# Patient Record
Sex: FEMALE | Race: WHITE | NOT HISPANIC OR LATINO | Employment: OTHER | URBAN - METROPOLITAN AREA
[De-identification: names, ages, dates, MRNs, and addresses within clinical notes are randomized per-mention and may not be internally consistent; named-entity substitution may affect disease eponyms.]

---

## 2017-10-12 ENCOUNTER — OFFICE VISIT (OUTPATIENT)
Dept: CARDIOLOGY | Facility: CLINIC | Age: 67
End: 2017-10-12

## 2017-10-12 VITALS
BODY MASS INDEX: 32.78 KG/M2 | SYSTOLIC BLOOD PRESSURE: 122 MMHG | HEIGHT: 64 IN | WEIGHT: 192 LBS | DIASTOLIC BLOOD PRESSURE: 77 MMHG | HEART RATE: 98 BPM

## 2017-10-12 DIAGNOSIS — I10 ESSENTIAL HYPERTENSION: ICD-10-CM

## 2017-10-12 DIAGNOSIS — M79.89 LEG SWELLING: ICD-10-CM

## 2017-10-12 DIAGNOSIS — Z79.01 ANTICOAGULATED: ICD-10-CM

## 2017-10-12 DIAGNOSIS — I48.19 PERSISTENT ATRIAL FIBRILLATION (HCC): Primary | ICD-10-CM

## 2017-10-12 DIAGNOSIS — I50.9 CONGESTIVE HEART FAILURE, UNSPECIFIED CONGESTIVE HEART FAILURE CHRONICITY, UNSPECIFIED CONGESTIVE HEART FAILURE TYPE: ICD-10-CM

## 2017-10-12 PROCEDURE — 93000 ELECTROCARDIOGRAM COMPLETE: CPT | Performed by: INTERNAL MEDICINE

## 2017-10-12 PROCEDURE — 99204 OFFICE O/P NEW MOD 45 MIN: CPT | Performed by: INTERNAL MEDICINE

## 2017-10-12 RX ORDER — LEVOTHYROXINE SODIUM 0.05 MG/1
50 TABLET ORAL DAILY
COMMUNITY
Start: 2017-07-26

## 2017-10-12 RX ORDER — ASPIRIN 81 MG/1
81 TABLET ORAL DAILY
COMMUNITY
End: 2017-11-28 | Stop reason: ALTCHOICE

## 2017-10-12 RX ORDER — ASCORBIC ACID 500 MG
1000 TABLET ORAL DAILY
Status: ON HOLD | COMMUNITY
End: 2018-01-05

## 2017-10-12 RX ORDER — FUROSEMIDE 20 MG/1
20 TABLET ORAL 2 TIMES DAILY
Qty: 30 TABLET | Refills: 3 | Status: SHIPPED | OUTPATIENT
Start: 2017-10-12 | End: 2017-12-07 | Stop reason: SDUPTHER

## 2017-10-12 RX ORDER — AMLODIPINE BESYLATE 10 MG/1
5 TABLET ORAL DAILY
COMMUNITY
Start: 2017-07-26

## 2017-10-12 RX ORDER — OMEPRAZOLE 40 MG/1
40 CAPSULE, DELAYED RELEASE ORAL DAILY
COMMUNITY
End: 2018-02-01 | Stop reason: ALTCHOICE

## 2017-10-12 RX ORDER — ERGOCALCIFEROL 1.25 MG/1
50000 CAPSULE ORAL WEEKLY
COMMUNITY
End: 2018-09-25 | Stop reason: ALTCHOICE

## 2017-10-12 RX ORDER — VALSARTAN 160 MG/1
320 TABLET ORAL DAILY
COMMUNITY
Start: 2017-10-10

## 2017-10-12 RX ORDER — LANOLIN ALCOHOL/MO/W.PET/CERES
1000 CREAM (GRAM) TOPICAL DAILY
Status: ON HOLD | COMMUNITY
End: 2018-01-05

## 2017-10-12 RX ORDER — RIVAROXABAN 20 MG/1
20 TABLET, FILM COATED ORAL
Status: ON HOLD | COMMUNITY
Start: 2017-10-10 | End: 2018-01-05

## 2017-10-12 RX ORDER — TRAMADOL HYDROCHLORIDE 50 MG/1
TABLET ORAL
Status: ON HOLD | COMMUNITY
Start: 2017-09-26 | End: 2018-01-05

## 2017-10-12 NOTE — PROGRESS NOTES
" Subjective:        CC  Chest pain     Tatyana Reyes is a 67 y.o. female who  is here for the cardiac evaluation as the patient recently had a left shoulder surgery or by deep venous thrombosis, has been complaining of the chest pains are retrosternal intermittent was seen in emergency room for the workup was negative was found to have the atrial flutter yesterday    Patient also complains of occasional palpitations intermittent mild in intensity associated with shortness of breath    Past Medical History:   Diagnosis Date   • Hypertension     reports that she has never smoked. She has never used smokeless tobacco. She reports that she does not drink alcohol or use illicit drugs.  Family History   Problem Relation Age of Onset   • Heart disease Mother    • Stroke Mother         Review of Systems  Constitutional: No wt loss, fever   Gastrointestinal: No nausea , abdominal pain  Behavioral/Psych: No insomnia or anxiety  Cardiovascular ----positive for Palpitation. All other systems reviewed and are negative    Objective:       Physical Exam             Physical Exam  /77  Pulse 98  Ht 64\" (162.6 cm)  Wt 192 lb (87.1 kg)  BMI 32.96 kg/m2    General appearance: NAD, conversant   Eyes: anicteric sclerae, moist conjunctivae; no lid-lag; PERRLA   HENT: Atraumatic; oropharynx clear with moist mucous membranes and no mucosal ulcerations;  normal hard and soft palate   Neck: Trachea midline; FROM, supple, no thyromegaly or lymphadenopathy   Lungs: CTA, with normal respiratory effort and no intercostal retractions   CV: S1-S2 regular, sys murmurs, no rub, no gallop   Abdomen: Soft, non-tender; no masses or HSM   Extremities: No peripheral edema or extremity lymphadenopathy  Skin: Normal temperature, turgor and texture; no rash, ulcers or subcutaneous nodules   Psych: Appropriate affect, alert and oriented to person, place and time           Cardiographics  @  ECG 12 Lead  Date/Time: 10/12/2017 12:46 PM  Performed by: " ROXANNA ARCOS  Authorized by: ROXANNA ARCOS   Comparison: not compared with previous ECG   Previous ECG: no previous ECG available  Rhythm: atrial fibrillation  ST Flattening: all  Clinical impression: abnormal ECG            Echocardiogram:     Imaging  Chest x-ray: not done     Lab Review   not applicable       Current Outpatient Prescriptions:   •  amLODIPine (NORVASC) 10 MG tablet, , Disp: , Rfl:   •  aspirin 81 MG EC tablet, Take 81 mg by mouth Daily., Disp: , Rfl:   •  levothyroxine (SYNTHROID, LEVOTHROID) 50 MCG tablet, , Disp: , Rfl:   •  Multiple Vitamins-Minerals (CENTRUM SILVER 50+WOMEN) tablet, Take  by mouth., Disp: , Rfl:   •  omeprazole (priLOSEC) 40 MG capsule, Take 40 mg by mouth Daily., Disp: , Rfl:   •  traMADol (ULTRAM) 50 MG tablet, , Disp: , Rfl:   •  valsartan (DIOVAN) 160 MG tablet, Daily. 1.5 tablets daily, Disp: , Rfl:   •  vitamin B-12 (CYANOCOBALAMIN) 1000 MCG tablet, Take 1,000 mcg by mouth Daily., Disp: , Rfl:   •  vitamin C (ASCORBIC ACID) 500 MG tablet, Take 1,000 mg by mouth Daily., Disp: , Rfl:   •  vitamin D (ERGOCALCIFEROL) 31251 units capsule capsule, Take 50,000 Units by mouth 1 (One) Time Per Week., Disp: , Rfl:   •  XARELTO 20 MG tablet, , Disp: , Rfl:         Assessment:      1. Persistent atrial fibrillation    2. Leg swelling    3. Congestive heart failure, unspecified congestive heart failure chronicity, unspecified congestive heart failure type    4. Essential hypertension    5. Anticoagulated        Patient Active Problem List   Diagnosis   • Persistent atrial fibrillation   • Leg swelling   • Congestive heart failure   • Essential hypertension   • Anticoagulated         Plan:          1. Persistent atrial fibrillation  Controlled rate  - ECG 12 Lead  - Adult Transthoracic Echo Complete W/ Cont if Necessary Per Protocol  - Adult Transthoracic Echo Complete W/ Cont if Necessary Per Protocol    2. Leg swelling  Tatyana Amy has been complaining of the leg  swelling, appears dependent pedal edema, significantly contributed with a venous insufficiency.    Strong recommendations of elevating the legs as well as using support hoses, along with the control of fluids and salt restriction has been explained in details    - Adult Transthoracic Echo Complete W/ Cont if Necessary Per Protocol  - Adult Transthoracic Echo Complete W/ Cont if Necessary Per Protocol    3. Congestive heart failure, unspecified congestive heart failure chronicity, unspecified congestive heart failure type  Bilateral leg swelling highly suggestive of cardiomyopathy will need the further workup  - Adult Transthoracic Echo Complete W/ Cont if Necessary Per Protocol  - Adult Transthoracic Echo Complete W/ Cont if Necessary Per Protocol    4. Essential hypertension  Blood pressures are well under control  - Adult Transthoracic Echo Complete W/ Cont if Necessary Per Protocol  - Adult Transthoracic Echo Complete W/ Cont if Necessary Per Protocol    5. Anticoagulated  Pros and cons as well as indication of the anticoagulation has been explained to the patient in detail    There are no obvious complications at this stage    Risk of  the bleedings has been explained    Need for the regular blood workup and adjust the dose has been explained    Need for proper follow-up on anticoagulation also has been explained    - Adult Transthoracic Echo Complete W/ Cont if Necessary Per Protocol  - Adult Transthoracic Echo Complete W/ Cont if Necessary Per Protocol      Add lasix 20 mg    Reduce amilodopine 5 mg    Echo    See us 2 wks      Counseling was given to Tatyana Reyes for the following topics:  risk factor reductions, prognosis and risks and benefits of treatment options .       SMOKING COUNSELING:    Counseling given: Not Answered      .  EMR Dragon/Transcription disclaimer:   Much of this encounter note is an electronic transcription/translation of spoken language to printed text. The electronic translation of  spoken language may permit erroneous, or at times, nonsensical words or phrases to be inadvertently transcribed; Although I have reviewed the note for such errors, some may still exist.

## 2017-10-31 ENCOUNTER — HOSPITAL ENCOUNTER (OUTPATIENT)
Dept: CARDIOLOGY | Facility: HOSPITAL | Age: 67
Discharge: HOME | End: 2017-10-31
Attending: INTERNAL MEDICINE | Admitting: INTERNAL MEDICINE

## 2017-10-31 LAB
BH CV ECHO MEAS - ACS: 1.5 CM
BH CV ECHO MEAS - AO MAX PG (FULL): 7.6 MMHG
BH CV ECHO MEAS - AO MAX PG: 14.7 MMHG
BH CV ECHO MEAS - AO MEAN PG (FULL): 4 MMHG
BH CV ECHO MEAS - AO MEAN PG: 7 MMHG
BH CV ECHO MEAS - AO ROOT AREA (BSA CORRECTED): 1.2
BH CV ECHO MEAS - AO ROOT AREA: 4.2 CM^2
BH CV ECHO MEAS - AO ROOT DIAM: 2.3 CM
BH CV ECHO MEAS - AO V2 MAX: 192 CM/SEC
BH CV ECHO MEAS - AO V2 MEAN: 120 CM/SEC
BH CV ECHO MEAS - AO V2 VTI: 37.4 CM
BH CV ECHO MEAS - ASC AORTA: 2.6 CM
BH CV ECHO MEAS - AVA(I,A): 2.1 CM^2
BH CV ECHO MEAS - AVA(I,D): 2.1 CM^2
BH CV ECHO MEAS - AVA(V,A): 2.2 CM^2
BH CV ECHO MEAS - AVA(V,D): 2.2 CM^2
BH CV ECHO MEAS - BSA(HAYCOCK): 2 M^2
BH CV ECHO MEAS - BSA: 1.9 M^2
BH CV ECHO MEAS - BZI_BMI: 33 KILOGRAMS/M^2
BH CV ECHO MEAS - BZI_METRIC_HEIGHT: 162.6 CM
BH CV ECHO MEAS - BZI_METRIC_WEIGHT: 87.1 KG
BH CV ECHO MEAS - CONTRAST EF (2CH): 62.8 ML/M^2
BH CV ECHO MEAS - CONTRAST EF 4CH: 57.9 ML/M^2
BH CV ECHO MEAS - EDV(CUBED): 103.8 ML
BH CV ECHO MEAS - EDV(MOD-SP2): 43 ML
BH CV ECHO MEAS - EDV(MOD-SP4): 57 ML
BH CV ECHO MEAS - EDV(TEICH): 102.4 ML
BH CV ECHO MEAS - EF(CUBED): 76.5 %
BH CV ECHO MEAS - EF(MOD-SP2): 62.8 %
BH CV ECHO MEAS - EF(MOD-SP4): 57.9 %
BH CV ECHO MEAS - EF(TEICH): 68.5 %
BH CV ECHO MEAS - ESV(CUBED): 24.4 ML
BH CV ECHO MEAS - ESV(MOD-SP2): 16 ML
BH CV ECHO MEAS - ESV(MOD-SP4): 24 ML
BH CV ECHO MEAS - ESV(TEICH): 32.2 ML
BH CV ECHO MEAS - FS: 38.3 %
BH CV ECHO MEAS - IVS/LVPW: 1
BH CV ECHO MEAS - IVSD: 1.2 CM
BH CV ECHO MEAS - LAT PEAK E' VEL: 12 CM/SEC
BH CV ECHO MEAS - LV DIASTOLIC VOL/BSA (35-75): 29.7 ML/M^2
BH CV ECHO MEAS - LV MASS(C)D: 212 GRAMS
BH CV ECHO MEAS - LV MASS(C)DI: 110.3 GRAMS/M^2
BH CV ECHO MEAS - LV MAX PG: 7.2 MMHG
BH CV ECHO MEAS - LV MEAN PG: 3 MMHG
BH CV ECHO MEAS - LV SYSTOLIC VOL/BSA (12-30): 12.5 ML/M^2
BH CV ECHO MEAS - LV V1 MAX: 134 CM/SEC
BH CV ECHO MEAS - LV V1 MEAN: 83.9 CM/SEC
BH CV ECHO MEAS - LV V1 VTI: 24.6 CM
BH CV ECHO MEAS - LVIDD: 4.7 CM
BH CV ECHO MEAS - LVIDS: 2.9 CM
BH CV ECHO MEAS - LVLD AP2: 6.7 CM
BH CV ECHO MEAS - LVLD AP4: 7.3 CM
BH CV ECHO MEAS - LVLS AP2: 5.9 CM
BH CV ECHO MEAS - LVLS AP4: 5.7 CM
BH CV ECHO MEAS - LVOT AREA (M): 3.1 CM^2
BH CV ECHO MEAS - LVOT AREA: 3.1 CM^2
BH CV ECHO MEAS - LVOT DIAM: 2 CM
BH CV ECHO MEAS - LVPWD: 1.2 CM
BH CV ECHO MEAS - MED PEAK E' VEL: 10 CM/SEC
BH CV ECHO MEAS - MR MAX PG: 52.4 MMHG
BH CV ECHO MEAS - MR MAX VEL: 362 CM/SEC
BH CV ECHO MEAS - MV DEC SLOPE: 462 CM/SEC^2
BH CV ECHO MEAS - MV DEC TIME: 127 SEC
BH CV ECHO MEAS - MV E MAX VEL: 112 CM/SEC
BH CV ECHO MEAS - MV MAX PG: 6.4 MMHG
BH CV ECHO MEAS - MV MEAN PG: 2 MMHG
BH CV ECHO MEAS - MV P1/2T MAX VEL: 134 CM/SEC
BH CV ECHO MEAS - MV P1/2T: 85 MSEC
BH CV ECHO MEAS - MV V2 MAX: 126 CM/SEC
BH CV ECHO MEAS - MV V2 MEAN: 55.7 CM/SEC
BH CV ECHO MEAS - MV V2 VTI: 31.2 CM
BH CV ECHO MEAS - MVA P1/2T LCG: 1.6 CM^2
BH CV ECHO MEAS - MVA(P1/2T): 2.6 CM^2
BH CV ECHO MEAS - MVA(VTI): 2.5 CM^2
BH CV ECHO MEAS - PA ACC TIME: 0.06 SEC
BH CV ECHO MEAS - PA MAX PG (FULL): 4.5 MMHG
BH CV ECHO MEAS - PA MAX PG: 6.4 MMHG
BH CV ECHO MEAS - PA PR(ACCEL): 52.9 MMHG
BH CV ECHO MEAS - PA V2 MAX: 126 CM/SEC
BH CV ECHO MEAS - PULM DIAS VEL: 66.2 CM/SEC
BH CV ECHO MEAS - PULM S/D: 0.83
BH CV ECHO MEAS - PULM SYS VEL: 54.9 CM/SEC
BH CV ECHO MEAS - PVA(V,A): 1.5 CM^2
BH CV ECHO MEAS - PVA(V,D): 1.5 CM^2
BH CV ECHO MEAS - QP/QS: 0.53
BH CV ECHO MEAS - RAP SYSTOLE: 3 MMHG
BH CV ECHO MEAS - RV MAX PG: 1.8 MMHG
BH CV ECHO MEAS - RV MEAN PG: 1 MMHG
BH CV ECHO MEAS - RV V1 MAX: 67.2 CM/SEC
BH CV ECHO MEAS - RV V1 MEAN: 45.1 CM/SEC
BH CV ECHO MEAS - RV V1 VTI: 14.4 CM
BH CV ECHO MEAS - RVOT AREA: 2.8 CM^2
BH CV ECHO MEAS - RVOT DIAM: 1.9 CM
BH CV ECHO MEAS - RVSP: 35 MMHG
BH CV ECHO MEAS - SI(AO): 80.8 ML/M^2
BH CV ECHO MEAS - SI(CUBED): 41.3 ML/M^2
BH CV ECHO MEAS - SI(LVOT): 40.2 ML/M^2
BH CV ECHO MEAS - SI(MOD-SP2): 14 ML/M^2
BH CV ECHO MEAS - SI(MOD-SP4): 17.2 ML/M^2
BH CV ECHO MEAS - SI(TEICH): 36.5 ML/M^2
BH CV ECHO MEAS - SV(AO): 155.4 ML
BH CV ECHO MEAS - SV(CUBED): 79.4 ML
BH CV ECHO MEAS - SV(LVOT): 77.3 ML
BH CV ECHO MEAS - SV(MOD-SP2): 27 ML
BH CV ECHO MEAS - SV(MOD-SP4): 33 ML
BH CV ECHO MEAS - SV(RVOT): 40.8 ML
BH CV ECHO MEAS - SV(TEICH): 70.1 ML
BH CV ECHO MEAS - TAPSE (>1.6): 1.7 CM2
BH CV ECHO MEAS - TR MAX VEL: 284 CM/SEC
BH CV VAS BP LEFT ARM: NORMAL MMHG
BH CV XLRA - RV BASE: 3.5 CM
BH CV XLRA - TDI S': 11 CM/SEC
E/E' RATIO: 10
LEFT ATRIUM VOLUME INDEX: 29.3 ML/M2
MAXIMAL PREDICTED HEART RATE: 151 BPM
STRESS TARGET HR: 128 BPM

## 2017-10-31 PROCEDURE — 93306 TTE W/DOPPLER COMPLETE: CPT | Performed by: INTERNAL MEDICINE

## 2017-10-31 PROCEDURE — 0399T HC MYOCARDL STRAIN IMAG QUAN ASSMT PER SESS: CPT

## 2017-10-31 PROCEDURE — 0399T ADULT TRANSTHORACIC ECHO COMPLETE W/ CONT IF NECESSARY PER PROTOCOL: CPT | Performed by: INTERNAL MEDICINE

## 2017-10-31 PROCEDURE — 93306 TTE W/DOPPLER COMPLETE: CPT

## 2017-11-02 ENCOUNTER — OFFICE VISIT (OUTPATIENT)
Dept: CARDIOLOGY | Facility: CLINIC | Age: 67
End: 2017-11-02

## 2017-11-02 VITALS
WEIGHT: 182 LBS | HEART RATE: 80 BPM | HEIGHT: 65 IN | BODY MASS INDEX: 30.32 KG/M2 | SYSTOLIC BLOOD PRESSURE: 144 MMHG | DIASTOLIC BLOOD PRESSURE: 79 MMHG

## 2017-11-02 DIAGNOSIS — I50.9 CONGESTIVE HEART FAILURE, UNSPECIFIED CONGESTIVE HEART FAILURE CHRONICITY, UNSPECIFIED CONGESTIVE HEART FAILURE TYPE: ICD-10-CM

## 2017-11-02 DIAGNOSIS — I48.91 ATRIAL FIBRILLATION, UNSPECIFIED TYPE (HCC): ICD-10-CM

## 2017-11-02 DIAGNOSIS — R07.89 CHEST PRESSURE: ICD-10-CM

## 2017-11-02 DIAGNOSIS — I48.19 PERSISTENT ATRIAL FIBRILLATION (HCC): Primary | ICD-10-CM

## 2017-11-02 PROCEDURE — 99214 OFFICE O/P EST MOD 30 MIN: CPT | Performed by: INTERNAL MEDICINE

## 2017-11-02 NOTE — PROGRESS NOTES
" Subjective:       Tatyana Reyes is a 67 y.o. female who here for follow up    CC  NOSE BLEED   HPI  On the follow-up for the atrial fibrillation congestive heart failure chest pressure, patient has been complaining of off-and-on episodes of the chest pains and tightness in chest mild to moderate in intensity     Problem List Items Addressed This Visit        Cardiovascular and Mediastinum    Persistent atrial fibrillation - Primary    Relevant Orders    Stress Test With Myocardial Perfusion One Day    Congestive heart failure    Relevant Orders    Stress Test With Myocardial Perfusion One Day      Other Visit Diagnoses     Atrial fibrillation, unspecified type        Relevant Orders    Stress Test With Myocardial Perfusion One Day    Chest pressure        Relevant Orders    Stress Test With Myocardial Perfusion One Day        .    The following portions of the patient's history were reviewed and updated as appropriate: allergies, current medications, past family history, past medical history, past social history, past surgical history and problem list.    Past Medical History:   Diagnosis Date   • Hypertension     reports that she has never smoked. She has never used smokeless tobacco. She reports that she does not drink alcohol or use illicit drugs.  Family History   Problem Relation Age of Onset   • Heart disease Mother    • Stroke Mother        Review of Systems  Constitutional: No wt loss, fever, fatigue  Gastrointestinal: No nausea, abdominal pain  Behavioral/Psych: No insomnia or anxiety   CardiovascularChest pains and tightness in chest  Objective:       Physical Exam             Physical Exam  /79  Pulse 80  Ht 65\" (165.1 cm)  Wt 182 lb (82.6 kg)  BMI 30.29 kg/m2    General appearance: NAD, conversant   Eyes: anicteric sclerae, moist conjunctivae; no lid-lag; PERRLA   HENT: Atraumatic; oropharynx clear with moist mucous membranes and no mucosal ulcerations;  normal hard and soft palate   Neck: " Trachea midline; FROM, supple, no thyromegaly or lymphadenopathy   Lungs: CTA, with normal respiratory effort and no intercostal retractions   CV: S1-S2 regular, no murmurs, no rub, no gallop   Abdomen: Soft, non-tender; no masses or HSM   Extremities: No peripheral edema or extremity lymphadenopathy  Skin: Normal temperature, turgor and texture; no rash, ulcers or subcutaneous nodules   Psych: Appropriate affect, alert and oriented to person, place and time           Cardiographics  @Procedures    Echocardiogram:        Current Outpatient Prescriptions:   •  amLODIPine (NORVASC) 10 MG tablet, TAKE 1/2 TABLET DAILY, Disp: , Rfl:   •  aspirin 81 MG EC tablet, Take 81 mg by mouth Daily., Disp: , Rfl:   •  furosemide (LASIX) 20 MG tablet, Take 1 tablet by mouth 2 (Two) Times a Day., Disp: 30 tablet, Rfl: 3  •  levothyroxine (SYNTHROID, LEVOTHROID) 50 MCG tablet, , Disp: , Rfl:   •  Multiple Vitamins-Minerals (CENTRUM SILVER 50+WOMEN) tablet, Take  by mouth., Disp: , Rfl:   •  omeprazole (priLOSEC) 40 MG capsule, Take 40 mg by mouth Daily., Disp: , Rfl:   •  traMADol (ULTRAM) 50 MG tablet, , Disp: , Rfl:   •  valsartan (DIOVAN) 160 MG tablet, Daily. 1.5 tablets daily, Disp: , Rfl:   •  vitamin B-12 (CYANOCOBALAMIN) 1000 MCG tablet, Take 1,000 mcg by mouth Daily., Disp: , Rfl:   •  vitamin C (ASCORBIC ACID) 500 MG tablet, Take 1,000 mg by mouth Daily., Disp: , Rfl:   •  vitamin D (ERGOCALCIFEROL) 30654 units capsule capsule, Take 50,000 Units by mouth 1 (One) Time Per Week., Disp: , Rfl:   •  XARELTO 20 MG tablet, , Disp: , Rfl:    Assessment:        Patient Active Problem List   Diagnosis   • Persistent atrial fibrillation   • Leg swelling   • Congestive heart failure   • Essential hypertension   • Anticoagulated     Interpretation Summary   · Left atrial cavity size is mildly dilated.  · Mild mitral valve regurgitation is present  · Mild tricuspid valve regurgitation is present.  · Left Ventricle: Calculated EF =  57.9%.  · There is no evidence of pericardial effusion                   Plan:            ICD-10-CM ICD-9-CM   1. Persistent atrial fibrillation I48.1 427.31   2. Congestive heart failure, unspecified congestive heart failure chronicity, unspecified congestive heart failure type I50.9 428.0   3. Atrial fibrillation, unspecified type I48.91 427.31   4. Chest pressure R07.89 786.59     1. Persistent atrial fibrillation  Under control  - Stress Test With Myocardial Perfusion One Day    2. Congestive heart failure, unspecified congestive heart failure chronicity, unspecified congestive heart failure type  Compensated  - Stress Test With Myocardial Perfusion One Day    3. Atrial fibrillation, unspecified type    - Stress Test With Myocardial Perfusion One Day    4. Chest pressure  Considering the patient's symptoms as well as clinical situation and  EKG findings, along with cardiac risk factors, ischemic workup is necessary to rule out ischemic cardiomyopathy, stress induced arrhythmias, and functional capacity for diagnosis as well as prognostic consideration    - Stress Test With Myocardial Perfusion One Day     LEXISCAN CARDIOLYTE    ENT REFFERAL    NOSE BLEED     SEE US 1 MONTH    STOP ASA FOR NOW    USE FUROSEMODE TO ONCE A DAY AND PRN    WILL CONSIDER CV  COUNSELING:    Tatyana GoldtCounseling was given to patient for the following topics: diagnostic results, risk factor reductions, impressions, risks and benefits of treatment options and importance of treatment compliance .       SMOKING COUNSELING:    Counseling given: Not Answered      EMR Dragon/Transcription disclaimer:   Much of this encounter note is an electronic transcription/translation of spoken language to printed text. The electronic translation of spoken language may permit erroneous, or at times, nonsensical words or phrases to be inadvertently transcribed; Although I have reviewed the note for such errors, some may still exist.

## 2017-11-28 ENCOUNTER — HOSPITAL ENCOUNTER (OUTPATIENT)
Dept: CARDIOLOGY | Facility: HOSPITAL | Age: 67
Discharge: HOME | End: 2017-11-28
Attending: INTERNAL MEDICINE

## 2017-11-28 VITALS
HEART RATE: 60 BPM | SYSTOLIC BLOOD PRESSURE: 128 MMHG | WEIGHT: 182 LBS | RESPIRATION RATE: 18 BRPM | BODY MASS INDEX: 30.32 KG/M2 | OXYGEN SATURATION: 99 % | DIASTOLIC BLOOD PRESSURE: 84 MMHG | HEIGHT: 65 IN

## 2017-11-28 PROCEDURE — A9500 TC99M SESTAMIBI: HCPCS | Performed by: INTERNAL MEDICINE

## 2017-11-28 PROCEDURE — 25010000002 REGADENOSON 0.4 MG/5ML SOLUTION: Performed by: INTERNAL MEDICINE

## 2017-11-28 PROCEDURE — 93018 CV STRESS TEST I&R ONLY: CPT | Performed by: INTERNAL MEDICINE

## 2017-11-28 PROCEDURE — 0 TECHNETIUM SESTAMIBI: Performed by: INTERNAL MEDICINE

## 2017-11-28 PROCEDURE — 78452 HT MUSCLE IMAGE SPECT MULT: CPT

## 2017-11-28 PROCEDURE — 93016 CV STRESS TEST SUPVJ ONLY: CPT | Performed by: INTERNAL MEDICINE

## 2017-11-28 PROCEDURE — 93017 CV STRESS TEST TRACING ONLY: CPT

## 2017-11-28 PROCEDURE — 78452 HT MUSCLE IMAGE SPECT MULT: CPT | Performed by: INTERNAL MEDICINE

## 2017-11-28 RX ADMIN — REGADENOSON 0.4 MG: 0.08 INJECTION, SOLUTION INTRAVENOUS at 09:35

## 2017-11-28 RX ADMIN — TECHNETIUM TC-99M SESTAMIBI 1 DOSE: 1 INJECTION INTRAVENOUS at 08:10

## 2017-11-28 RX ADMIN — TECHNETIUM TC-99M SESTAMIBI 1 DOSE: 1 INJECTION INTRAVENOUS at 09:35

## 2017-11-29 LAB
BH CV STRESS BP STAGE 1: NORMAL
BH CV STRESS COMMENTS STAGE 1: NORMAL
BH CV STRESS DOSE REGADENOSON STAGE 1: 0.4
BH CV STRESS DURATION MIN STAGE 1: 0
BH CV STRESS DURATION SEC STAGE 1: 15
BH CV STRESS HR STAGE 1: 109
BH CV STRESS O2 STAGE 1: 99
BH CV STRESS PROTOCOL 1: NORMAL
BH CV STRESS RECOVERY BP: NORMAL MMHG
BH CV STRESS RECOVERY HR: 90 BPM
BH CV STRESS RECOVERY O2: 99 %
BH CV STRESS STAGE 1: 1
LV EF NUC BP: 60 %
MAXIMAL PREDICTED HEART RATE: 151 BPM
PERCENT MAX PREDICTED HR: 72.19 %
STRESS BASELINE BP: NORMAL MMHG
STRESS BASELINE HR: 95 BPM
STRESS O2 SAT REST: 99 %
STRESS PERCENT HR: 85 %
STRESS POST ESTIMATED WORKLOAD: 1 METS
STRESS POST EXERCISE DUR MIN: 0 MIN
STRESS POST EXERCISE DUR SEC: 0 SEC
STRESS POST O2 SAT PEAK: 99 %
STRESS POST PEAK BP: NORMAL MMHG
STRESS POST PEAK HR: 109 BPM
STRESS TARGET HR: 128 BPM

## 2017-12-07 ENCOUNTER — OFFICE VISIT (OUTPATIENT)
Dept: CARDIOLOGY | Facility: CLINIC | Age: 67
End: 2017-12-07

## 2017-12-07 VITALS
SYSTOLIC BLOOD PRESSURE: 147 MMHG | WEIGHT: 174 LBS | BODY MASS INDEX: 28.96 KG/M2 | DIASTOLIC BLOOD PRESSURE: 77 MMHG | HEART RATE: 83 BPM

## 2017-12-07 DIAGNOSIS — I48.19 PERSISTENT ATRIAL FIBRILLATION (HCC): ICD-10-CM

## 2017-12-07 DIAGNOSIS — R94.30 ABNORMAL CARDIAC FUNCTION TEST: Primary | ICD-10-CM

## 2017-12-07 DIAGNOSIS — I10 ESSENTIAL HYPERTENSION: ICD-10-CM

## 2017-12-07 DIAGNOSIS — R07.89 OTHER CHEST PAIN: ICD-10-CM

## 2017-12-07 DIAGNOSIS — R07.2 PRECORDIAL PAIN: ICD-10-CM

## 2017-12-07 PROCEDURE — 99213 OFFICE O/P EST LOW 20 MIN: CPT | Performed by: INTERNAL MEDICINE

## 2017-12-07 RX ORDER — FUROSEMIDE 20 MG/1
20 TABLET ORAL DAILY
Qty: 90 TABLET | Refills: 3 | Status: SHIPPED | OUTPATIENT
Start: 2017-12-07

## 2017-12-07 NOTE — PROGRESS NOTES
Subjective:       Tatyana Reyes is a 67 y.o. female who here for follow up    CC  Follow-up after the stress test as well as echocardiogram performed last week  HPI  67-year-old female with known history of atrial fibrillation underwent a stress test as well as echocardiogram last week which is moderately abnormal continues to complains of shortness of breath    Tatyana Reyes has been complaining of the shortness of breath mild-to-moderate in intensity with mild-to-moderate usually relieved with rest associated with anxiety and fatigue       Problem List Items Addressed This Visit        Cardiovascular and Mediastinum    Abnormal cardiac function test - Primary    Relevant Orders    Case Request Cath Lab: Left Heart Cath (Completed)    CBC & Differential    Basic Metabolic Panel    aPTT    Protime-INR    Persistent atrial fibrillation    Essential hypertension    Relevant Medications    furosemide (LASIX) 20 MG tablet      Other Visit Diagnoses     Other chest pain         Relevant Orders    aPTT    Precordial pain         Relevant Orders    Protime-INR        .    The following portions of the patient's history were reviewed and updated as appropriate: allergies, current medications, past family history, past medical history, past social history, past surgical history and problem list.    Past Medical History:   Diagnosis Date   • Atrial fibrillation    • Disease of thyroid gland    • Hypertension     reports that she has never smoked. She has never used smokeless tobacco. She reports that she does not drink alcohol or use illicit drugs.  Family History   Problem Relation Age of Onset   • Heart disease Mother    • Stroke Mother    • Diabetes Brother        Review of Systems  Constitutional: No wt loss, fever, fatigue  Gastrointestinal: No nausea, abdominal pain  Behavioral/Psych: No insomnia or anxiety   Cardiovascular Shortness of breath  Objective:       Physical Exam             Physical Exam  /77  Pulse  83  Wt 78.9 kg (174 lb)  BMI 28.96 kg/m2    General appearance: NAD, conversant   Eyes: anicteric sclerae, moist conjunctivae; no lid-lag; PERRLA   HENT: Atraumatic; oropharynx clear with moist mucous membranes and no mucosal ulcerations;  normal hard and soft palate   Neck: Trachea midline; FROM, supple, no thyromegaly or lymphadenopathy   Lungs: CTA, with normal respiratory effort and no intercostal retractions   CV: S1-S2 irregular, no murmurs, no rub, no gallop   Abdomen: Soft, non-tender; no masses or HSM   Extremities: No peripheral edema or extremity lymphadenopathy  Skin: Normal temperature, turgor and texture; no rash, ulcers or subcutaneous nodules   Psych: Appropriate affect, alert and oriented to person, place and time           Cardiographics  @Procedures    Echocardiogram:        Current Outpatient Prescriptions:   •  amLODIPine (NORVASC) 10 MG tablet, TAKE 1/2 TABLET DAILY, Disp: , Rfl:   •  furosemide (LASIX) 20 MG tablet, Take 1 tablet by mouth 2 (Two) Times a Day., Disp: 30 tablet, Rfl: 3  •  levothyroxine (SYNTHROID, LEVOTHROID) 50 MCG tablet, , Disp: , Rfl:   •  Multiple Vitamins-Minerals (CENTRUM SILVER 50+WOMEN) tablet, Take  by mouth., Disp: , Rfl:   •  omeprazole (priLOSEC) 40 MG capsule, Take 40 mg by mouth Daily., Disp: , Rfl:   •  traMADol (ULTRAM) 50 MG tablet, , Disp: , Rfl:   •  valsartan (DIOVAN) 160 MG tablet, Daily. 1.5 tablets daily, Disp: , Rfl:   •  vitamin B-12 (CYANOCOBALAMIN) 1000 MCG tablet, Take 1,000 mcg by mouth Daily., Disp: , Rfl:   •  vitamin C (ASCORBIC ACID) 500 MG tablet, Take 1,000 mg by mouth Daily., Disp: , Rfl:   •  vitamin D (ERGOCALCIFEROL) 22564 units capsule capsule, Take 50,000 Units by mouth 1 (One) Time Per Week., Disp: , Rfl:   •  XARELTO 20 MG tablet, , Disp: , Rfl:    Assessment:        Patient Active Problem List   Diagnosis   • Persistent atrial fibrillation   • Leg swelling   • Congestive heart failure   • Essential hypertension   • Anticoagulated      Interpretation Summary      · Findings consistent with a normal ECG stress test.  · Left ventricular ejection fraction is normal (Calculated EF = 60%).  · Myocardial perfusion imaging indicates a small-sized, mildly severe area of ischemia located in the apex and inferior wall.  · Impressions are consistent with an intermediate risk study.         Interpretation Summary   · Left atrial cavity size is mildly dilated.  · Mild mitral valve regurgitation is present  · Mild tricuspid valve regurgitation is present.  · Left Ventricle: Calculated EF = 57.9%.  · There is no evidence of pericardial effusion               Plan:            ICD-10-CM ICD-9-CM   1. Abnormal cardiac function test R94.30 794.30   2. Other chest pain  R07.89 786.59   3. Precordial pain  R07.2 786.51   4. Persistent atrial fibrillation I48.1 427.31   5. Essential hypertension I10 401.9     1. Abnormal cardiac function test  Procedure, risks and options of cardiac cath explained to pt INCLUDING BUT NOT LIMITED TO MI, STROKE, DEATH, INFECTION HAEMORRHAGE, . Pt understands well and agrees with no further questions.  - Case Request Cath Lab: Left Heart Cath  - CBC & Differential  - Basic Metabolic Panel  - aPTT  - Protime-INR    2. Other chest pain   Will need further ischemic workup  - aPTT    3. Precordial pain   Will proceed with ischemic workup  - Protime-INR    4. Persistent atrial fibrillation  Now in normal sinus rhythm    5. Essential hypertension  Well-controlled with current medications     Procedure, risks and options of cardiac cath explained to pt INCLUDING BUT NOT LIMITED TO MI, STROKE, DEATH, INFECTION HAEMORRHAGE, . Pt understands well and agrees with no further questions.  COUNSELING:    Tatyana Ayala was given to patient for the following topics: diagnostic results, risk factor reductions, impressions, risks and benefits of treatment options and importance of treatment compliance .       SMOKING COUNSELING:    Counseling  given: Not Answered      EMR Dragon/Transcription disclaimer:   Much of this encounter note is an electronic transcription/translation of spoken language to printed text. The electronic translation of spoken language may permit erroneous, or at times, nonsensical words or phrases to be inadvertently transcribed; Although I have reviewed the note for such errors, some may still exist.

## 2017-12-12 ENCOUNTER — TELEPHONE (OUTPATIENT)
Dept: CARDIOLOGY | Facility: CLINIC | Age: 67
End: 2017-12-12

## 2017-12-12 NOTE — TELEPHONE ENCOUNTER
----- Message from Augustina Avila sent at 12/12/2017  9:29 AM EST -----  Regarding: cath or surgery  Contact: [phone]  Pt is scheduled for a cath on Jan 5.  She is needing shoulder surgery and her dr said if she gets a stent she can not have surgery for a year.  Can she put off the cath so she can get shoulder surgery or is Dr BRITTON sure she isn't going to get a stent.

## 2017-12-12 NOTE — TELEPHONE ENCOUNTER
Per Dr Ramesh can not give clearance with out cath. They will discuss shoulder surgery on day of cath and what options she has.    S/w pt she verbalized understanding.

## 2017-12-28 ENCOUNTER — TELEPHONE (OUTPATIENT)
Dept: CARDIOLOGY | Facility: CLINIC | Age: 67
End: 2017-12-28

## 2017-12-28 NOTE — TELEPHONE ENCOUNTER
Dr. Luz Burt  [facility]  [phone]    Talia      1. Pt is having pre op labs done prior to cath on Tuesday.  She is having the same labs done in their office today for her other chronic medical problems.  Will add on PT/INR that was wanted per Dr. BRITTON's recent note.  Is it ok to cancel preop labs. [facility] will send us a copy of labs drawn in their office     2. Pts BP is running high, she is back in AFIB. HR is in the 90's. Metoprolol would be a good addition to her BP regimen.  Is this ok to be added today, do you prefer something else, or wait until after the cath.    12/29/17 Labs need to be done within 5 days of Cath.  Spoke with Dr. Ramesh, Metoprolol is ok to add. ar

## 2018-01-02 ENCOUNTER — HOSPITAL ENCOUNTER (OUTPATIENT)
Dept: CARDIOLOGY | Facility: HOSPITAL | Age: 68
Discharge: HOME | End: 2018-01-02
Admitting: INTERNAL MEDICINE

## 2018-01-02 LAB
ANION GAP SERPL CALCULATED.3IONS-SCNC: 14.2 MMOL/L
APTT PPP: 33.1 SECONDS (ref 22.7–35.4)
BASOPHILS # BLD AUTO: 0.02 10*3/MM3 (ref 0–0.2)
BASOPHILS NFR BLD AUTO: 0.5 % (ref 0–1.5)
BUN BLD-MCNC: 23 MG/DL (ref 8–23)
BUN/CREAT SERPL: 19.8 (ref 7–25)
CALCIUM SPEC-SCNC: 8.9 MG/DL (ref 8.6–10.5)
CHLORIDE SERPL-SCNC: 103 MMOL/L (ref 98–107)
CO2 SERPL-SCNC: 23.8 MMOL/L (ref 22–29)
CREAT BLD-MCNC: 1.16 MG/DL (ref 0.57–1)
DEPRECATED RDW RBC AUTO: 50.4 FL (ref 37–54)
EOSINOPHIL # BLD AUTO: 0.07 10*3/MM3 (ref 0–0.7)
EOSINOPHIL NFR BLD AUTO: 1.8 % (ref 0.3–6.2)
ERYTHROCYTE [DISTWIDTH] IN BLOOD BY AUTOMATED COUNT: 15.1 % (ref 11.7–13)
GFR SERPL CREATININE-BSD FRML MDRD: 46 ML/MIN/1.73
GLUCOSE BLD-MCNC: 103 MG/DL (ref 65–99)
HCT VFR BLD AUTO: 38.4 % (ref 35.6–45.5)
HGB BLD-MCNC: 12 G/DL (ref 11.9–15.5)
IMM GRANULOCYTES # BLD: 0 10*3/MM3 (ref 0–0.03)
IMM GRANULOCYTES NFR BLD: 0 % (ref 0–0.5)
INR PPP: 1.42 (ref 0.9–1.1)
LYMPHOCYTES # BLD AUTO: 1.97 10*3/MM3 (ref 0.9–4.8)
LYMPHOCYTES NFR BLD AUTO: 49.4 % (ref 19.6–45.3)
MCH RBC QN AUTO: 28.4 PG (ref 26.9–32)
MCHC RBC AUTO-ENTMCNC: 31.3 G/DL (ref 32.4–36.3)
MCV RBC AUTO: 90.8 FL (ref 80.5–98.2)
MONOCYTES # BLD AUTO: 0.23 10*3/MM3 (ref 0.2–1.2)
MONOCYTES NFR BLD AUTO: 5.8 % (ref 5–12)
NEUTROPHILS # BLD AUTO: 1.7 10*3/MM3 (ref 1.9–8.1)
NEUTROPHILS NFR BLD AUTO: 42.5 % (ref 42.7–76)
PLATELET # BLD AUTO: 231 10*3/MM3 (ref 140–500)
PMV BLD AUTO: 9.7 FL (ref 6–12)
POTASSIUM BLD-SCNC: 4.4 MMOL/L (ref 3.5–5.2)
PROTHROMBIN TIME: 16.8 SECONDS (ref 11.7–14.2)
RBC # BLD AUTO: 4.23 10*6/MM3 (ref 3.9–5.2)
SODIUM BLD-SCNC: 141 MMOL/L (ref 136–145)
WBC NRBC COR # BLD: 3.99 10*3/MM3 (ref 4.5–10.7)

## 2018-01-02 PROCEDURE — 36415 COLL VENOUS BLD VENIPUNCTURE: CPT | Performed by: INTERNAL MEDICINE

## 2018-01-02 PROCEDURE — 85610 PROTHROMBIN TIME: CPT | Performed by: INTERNAL MEDICINE

## 2018-01-02 PROCEDURE — 85730 THROMBOPLASTIN TIME PARTIAL: CPT | Performed by: INTERNAL MEDICINE

## 2018-01-02 PROCEDURE — 80048 BASIC METABOLIC PNL TOTAL CA: CPT | Performed by: INTERNAL MEDICINE

## 2018-01-02 PROCEDURE — 85025 COMPLETE CBC W/AUTO DIFF WBC: CPT | Performed by: INTERNAL MEDICINE

## 2018-01-05 ENCOUNTER — HOSPITAL ENCOUNTER (OUTPATIENT)
Facility: HOSPITAL | Age: 68
Setting detail: HOSPITAL OUTPATIENT SURGERY
Discharge: HOME | End: 2018-01-05
Attending: INTERNAL MEDICINE | Admitting: INTERNAL MEDICINE

## 2018-01-05 VITALS
TEMPERATURE: 99.8 F | DIASTOLIC BLOOD PRESSURE: 82 MMHG | HEART RATE: 72 BPM | RESPIRATION RATE: 16 BRPM | HEIGHT: 65 IN | WEIGHT: 174 LBS | SYSTOLIC BLOOD PRESSURE: 150 MMHG | BODY MASS INDEX: 28.99 KG/M2 | OXYGEN SATURATION: 96 %

## 2018-01-05 DIAGNOSIS — R94.30 ABNORMAL CARDIAC FUNCTION TEST: ICD-10-CM

## 2018-01-05 LAB
ACT BLD: 263 SECONDS (ref 82–152)
ACT BLD: 268 SECONDS (ref 82–152)

## 2018-01-05 PROCEDURE — 25010000002 FENTANYL CITRATE (PF) 100 MCG/2ML SOLUTION: Performed by: INTERNAL MEDICINE

## 2018-01-05 PROCEDURE — 93458 L HRT ARTERY/VENTRICLE ANGIO: CPT | Performed by: INTERNAL MEDICINE

## 2018-01-05 PROCEDURE — 25010000002 HEPARIN (PORCINE) PER 1000 UNITS: Performed by: INTERNAL MEDICINE

## 2018-01-05 PROCEDURE — 92928 PRQ TCAT PLMT NTRAC ST 1 LES: CPT | Performed by: INTERNAL MEDICINE

## 2018-01-05 PROCEDURE — 93005 ELECTROCARDIOGRAM TRACING: CPT | Performed by: INTERNAL MEDICINE

## 2018-01-05 PROCEDURE — C1725 CATH, TRANSLUMIN NON-LASER: HCPCS | Performed by: INTERNAL MEDICINE

## 2018-01-05 PROCEDURE — C1769 GUIDE WIRE: HCPCS | Performed by: INTERNAL MEDICINE

## 2018-01-05 PROCEDURE — 85347 COAGULATION TIME ACTIVATED: CPT

## 2018-01-05 PROCEDURE — C1894 INTRO/SHEATH, NON-LASER: HCPCS | Performed by: INTERNAL MEDICINE

## 2018-01-05 PROCEDURE — C1887 CATHETER, GUIDING: HCPCS | Performed by: INTERNAL MEDICINE

## 2018-01-05 PROCEDURE — 0 IOPAMIDOL PER 1 ML: Performed by: INTERNAL MEDICINE

## 2018-01-05 PROCEDURE — 99152 MOD SED SAME PHYS/QHP 5/>YRS: CPT | Performed by: INTERNAL MEDICINE

## 2018-01-05 PROCEDURE — 25010000002 MIDAZOLAM PER 1 MG: Performed by: INTERNAL MEDICINE

## 2018-01-05 PROCEDURE — 93010 ELECTROCARDIOGRAM REPORT: CPT | Performed by: INTERNAL MEDICINE

## 2018-01-05 PROCEDURE — 99153 MOD SED SAME PHYS/QHP EA: CPT | Performed by: INTERNAL MEDICINE

## 2018-01-05 PROCEDURE — C1876 STENT, NON-COA/NON-COV W/DEL: HCPCS | Performed by: INTERNAL MEDICINE

## 2018-01-05 DEVICE — MULTI-LINK RX VISION CORONARY STENT SYSTEM 3.00 MM X 15 MM
Type: IMPLANTABLE DEVICE | Status: FUNCTIONAL
Brand: MULTI-LINK VISION

## 2018-01-05 RX ORDER — ALPRAZOLAM 0.25 MG/1
1 TABLET ORAL 2 TIMES DAILY PRN
Status: DISCONTINUED | OUTPATIENT
Start: 2018-01-05 | End: 2018-01-05 | Stop reason: HOSPADM

## 2018-01-05 RX ORDER — MIDAZOLAM HYDROCHLORIDE 1 MG/ML
INJECTION INTRAMUSCULAR; INTRAVENOUS AS NEEDED
Status: DISCONTINUED | OUTPATIENT
Start: 2018-01-05 | End: 2018-01-05 | Stop reason: HOSPADM

## 2018-01-05 RX ORDER — CLOPIDOGREL BISULFATE 75 MG/1
75 TABLET ORAL DAILY
Qty: 30 TABLET | Refills: 11 | Status: SHIPPED | OUTPATIENT
Start: 2018-01-05 | End: 2018-01-05

## 2018-01-05 RX ORDER — ALPRAZOLAM 0.25 MG/1
0.5 TABLET ORAL 2 TIMES DAILY PRN
Status: DISCONTINUED | OUTPATIENT
Start: 2018-01-05 | End: 2018-01-05 | Stop reason: HOSPADM

## 2018-01-05 RX ORDER — PROMETHAZINE HYDROCHLORIDE 25 MG/ML
12.5 INJECTION, SOLUTION INTRAMUSCULAR; INTRAVENOUS EVERY 4 HOURS PRN
Status: DISCONTINUED | OUTPATIENT
Start: 2018-01-05 | End: 2018-01-05 | Stop reason: HOSPADM

## 2018-01-05 RX ORDER — FENTANYL CITRATE 50 UG/ML
INJECTION, SOLUTION INTRAMUSCULAR; INTRAVENOUS AS NEEDED
Status: DISCONTINUED | OUTPATIENT
Start: 2018-01-05 | End: 2018-01-05 | Stop reason: HOSPADM

## 2018-01-05 RX ORDER — NITROGLYCERIN 0.4 MG/1
0.4 TABLET SUBLINGUAL
Status: DISCONTINUED | OUTPATIENT
Start: 2018-01-05 | End: 2018-01-05 | Stop reason: HOSPADM

## 2018-01-05 RX ORDER — ASPIRIN 325 MG
325 TABLET, DELAYED RELEASE (ENTERIC COATED) ORAL DAILY
Status: DISCONTINUED | OUTPATIENT
Start: 2018-01-05 | End: 2018-01-05 | Stop reason: HOSPADM

## 2018-01-05 RX ORDER — LIDOCAINE HYDROCHLORIDE 10 MG/ML
0.1 INJECTION, SOLUTION EPIDURAL; INFILTRATION; INTRACAUDAL; PERINEURAL ONCE AS NEEDED
Status: DISCONTINUED | OUTPATIENT
Start: 2018-01-05 | End: 2018-01-05 | Stop reason: HOSPADM

## 2018-01-05 RX ORDER — CLOPIDOGREL BISULFATE 75 MG/1
75 TABLET ORAL DAILY
Qty: 30 TABLET | Refills: 11 | Status: SHIPPED | OUTPATIENT
Start: 2018-01-06 | End: 2018-09-25 | Stop reason: ALTCHOICE

## 2018-01-05 RX ORDER — LIDOCAINE HYDROCHLORIDE 20 MG/ML
INJECTION, SOLUTION INFILTRATION; PERINEURAL AS NEEDED
Status: DISCONTINUED | OUTPATIENT
Start: 2018-01-05 | End: 2018-01-05 | Stop reason: HOSPADM

## 2018-01-05 RX ORDER — ASPIRIN 325 MG
325 TABLET, DELAYED RELEASE (ENTERIC COATED) ORAL DAILY
Start: 2018-01-06 | End: 2018-09-25 | Stop reason: ALTCHOICE

## 2018-01-05 RX ORDER — ACETAMINOPHEN 325 MG/1
650 TABLET ORAL EVERY 4 HOURS PRN
Status: DISCONTINUED | OUTPATIENT
Start: 2018-01-05 | End: 2018-01-05 | Stop reason: HOSPADM

## 2018-01-05 RX ORDER — MIDAZOLAM HYDROCHLORIDE 1 MG/ML
1 INJECTION INTRAMUSCULAR; INTRAVENOUS
Status: DISCONTINUED | OUTPATIENT
Start: 2018-01-05 | End: 2018-01-05 | Stop reason: HOSPADM

## 2018-01-05 RX ORDER — SODIUM CHLORIDE 9 MG/ML
75 INJECTION, SOLUTION INTRAVENOUS CONTINUOUS
Status: DISCONTINUED | OUTPATIENT
Start: 2018-01-05 | End: 2018-01-05 | Stop reason: HOSPADM

## 2018-01-05 RX ORDER — HEPARIN SODIUM 1000 [USP'U]/ML
INJECTION, SOLUTION INTRAVENOUS; SUBCUTANEOUS AS NEEDED
Status: DISCONTINUED | OUTPATIENT
Start: 2018-01-05 | End: 2018-01-05 | Stop reason: HOSPADM

## 2018-01-05 RX ORDER — METOCLOPRAMIDE HYDROCHLORIDE 5 MG/ML
10 INJECTION INTRAMUSCULAR; INTRAVENOUS EVERY 6 HOURS PRN
Status: DISCONTINUED | OUTPATIENT
Start: 2018-01-05 | End: 2018-01-05 | Stop reason: HOSPADM

## 2018-01-05 RX ORDER — ONDANSETRON 2 MG/ML
4 INJECTION INTRAMUSCULAR; INTRAVENOUS EVERY 6 HOURS PRN
Status: DISCONTINUED | OUTPATIENT
Start: 2018-01-05 | End: 2018-01-05 | Stop reason: HOSPADM

## 2018-01-05 RX ORDER — SODIUM CHLORIDE 0.9 % (FLUSH) 0.9 %
1-10 SYRINGE (ML) INJECTION AS NEEDED
Status: DISCONTINUED | OUTPATIENT
Start: 2018-01-05 | End: 2018-01-05 | Stop reason: HOSPADM

## 2018-01-05 RX ORDER — CLOPIDOGREL BISULFATE 75 MG/1
75 TABLET ORAL DAILY
Status: DISCONTINUED | OUTPATIENT
Start: 2018-01-06 | End: 2018-01-05 | Stop reason: HOSPADM

## 2018-01-05 RX ORDER — ALPRAZOLAM 0.25 MG/1
1 TABLET ORAL ONCE AS NEEDED
Status: DISCONTINUED | OUTPATIENT
Start: 2018-01-05 | End: 2018-01-05

## 2018-01-05 RX ORDER — VERAPAMIL HYDROCHLORIDE 2.5 MG/ML
INJECTION, SOLUTION INTRAVENOUS AS NEEDED
Status: DISCONTINUED | OUTPATIENT
Start: 2018-01-05 | End: 2018-01-05 | Stop reason: HOSPADM

## 2018-01-05 RX ORDER — FENTANYL CITRATE 50 UG/ML
50 INJECTION, SOLUTION INTRAMUSCULAR; INTRAVENOUS
Status: DISCONTINUED | OUTPATIENT
Start: 2018-01-05 | End: 2018-01-05 | Stop reason: HOSPADM

## 2018-01-05 RX ORDER — SODIUM CHLORIDE 9 MG/ML
INJECTION, SOLUTION INTRAVENOUS CONTINUOUS PRN
Status: DISCONTINUED | OUTPATIENT
Start: 2018-01-05 | End: 2018-01-05 | Stop reason: HOSPADM

## 2018-01-05 RX ORDER — RIVAROXABAN 20 MG/1
20 TABLET, FILM COATED ORAL
Qty: 30 TABLET
Start: 2018-01-07 | End: 2018-03-27

## 2018-01-05 RX ORDER — HYDROCODONE BITARTRATE AND ACETAMINOPHEN 5; 325 MG/1; MG/1
1 TABLET ORAL EVERY 4 HOURS PRN
Status: DISCONTINUED | OUTPATIENT
Start: 2018-01-05 | End: 2018-01-05 | Stop reason: HOSPADM

## 2018-01-05 RX ORDER — HYDRALAZINE HYDROCHLORIDE 20 MG/ML
10 INJECTION INTRAMUSCULAR; INTRAVENOUS ONCE AS NEEDED
Status: DISCONTINUED | OUTPATIENT
Start: 2018-01-05 | End: 2018-01-05 | Stop reason: HOSPADM

## 2018-01-05 RX ORDER — ASPIRIN 325 MG
TABLET ORAL AS NEEDED
Status: DISCONTINUED | OUTPATIENT
Start: 2018-01-05 | End: 2018-01-05 | Stop reason: HOSPADM

## 2018-01-05 RX ORDER — NITROGLYCERIN 5 MG/ML
INJECTION, SOLUTION INTRAVENOUS AS NEEDED
Status: DISCONTINUED | OUTPATIENT
Start: 2018-01-05 | End: 2018-01-05 | Stop reason: HOSPADM

## 2018-01-05 RX ADMIN — HYDROCODONE BITARTRATE AND ACETAMINOPHEN 1 TABLET: 5; 325 TABLET ORAL at 09:35

## 2018-01-05 RX ADMIN — FENTANYL CITRATE 50 MCG: 50 INJECTION INTRAMUSCULAR; INTRAVENOUS at 09:33

## 2018-01-05 RX ADMIN — SODIUM CHLORIDE 75 ML/HR: 9 INJECTION, SOLUTION INTRAVENOUS at 07:03

## 2018-01-05 RX ADMIN — ALPRAZOLAM 0.5 MG: 0.5 TABLET ORAL at 10:36

## 2018-01-05 NOTE — H&P (VIEW-ONLY)
Subjective:       Tatyana Reyes is a 67 y.o. female who here for follow up    CC  Follow-up after the stress test as well as echocardiogram performed last week  HPI  67-year-old female with known history of atrial fibrillation underwent a stress test as well as echocardiogram last week which is moderately abnormal continues to complains of shortness of breath    Tatyana Reyes has been complaining of the shortness of breath mild-to-moderate in intensity with mild-to-moderate usually relieved with rest associated with anxiety and fatigue       Problem List Items Addressed This Visit        Cardiovascular and Mediastinum    Abnormal cardiac function test - Primary    Relevant Orders    Case Request Cath Lab: Left Heart Cath (Completed)    CBC & Differential    Basic Metabolic Panel    aPTT    Protime-INR    Persistent atrial fibrillation    Essential hypertension    Relevant Medications    furosemide (LASIX) 20 MG tablet      Other Visit Diagnoses     Other chest pain         Relevant Orders    aPTT    Precordial pain         Relevant Orders    Protime-INR        .    The following portions of the patient's history were reviewed and updated as appropriate: allergies, current medications, past family history, past medical history, past social history, past surgical history and problem list.    Past Medical History:   Diagnosis Date   • Atrial fibrillation    • Disease of thyroid gland    • Hypertension     reports that she has never smoked. She has never used smokeless tobacco. She reports that she does not drink alcohol or use illicit drugs.  Family History   Problem Relation Age of Onset   • Heart disease Mother    • Stroke Mother    • Diabetes Brother        Review of Systems  Constitutional: No wt loss, fever, fatigue  Gastrointestinal: No nausea, abdominal pain  Behavioral/Psych: No insomnia or anxiety   Cardiovascular Shortness of breath  Objective:       Physical Exam             Physical Exam  /77  Pulse  83  Wt 78.9 kg (174 lb)  BMI 28.96 kg/m2    General appearance: NAD, conversant   Eyes: anicteric sclerae, moist conjunctivae; no lid-lag; PERRLA   HENT: Atraumatic; oropharynx clear with moist mucous membranes and no mucosal ulcerations;  normal hard and soft palate   Neck: Trachea midline; FROM, supple, no thyromegaly or lymphadenopathy   Lungs: CTA, with normal respiratory effort and no intercostal retractions   CV: S1-S2 irregular, no murmurs, no rub, no gallop   Abdomen: Soft, non-tender; no masses or HSM   Extremities: No peripheral edema or extremity lymphadenopathy  Skin: Normal temperature, turgor and texture; no rash, ulcers or subcutaneous nodules   Psych: Appropriate affect, alert and oriented to person, place and time           Cardiographics  @Procedures    Echocardiogram:        Current Outpatient Prescriptions:   •  amLODIPine (NORVASC) 10 MG tablet, TAKE 1/2 TABLET DAILY, Disp: , Rfl:   •  furosemide (LASIX) 20 MG tablet, Take 1 tablet by mouth 2 (Two) Times a Day., Disp: 30 tablet, Rfl: 3  •  levothyroxine (SYNTHROID, LEVOTHROID) 50 MCG tablet, , Disp: , Rfl:   •  Multiple Vitamins-Minerals (CENTRUM SILVER 50+WOMEN) tablet, Take  by mouth., Disp: , Rfl:   •  omeprazole (priLOSEC) 40 MG capsule, Take 40 mg by mouth Daily., Disp: , Rfl:   •  traMADol (ULTRAM) 50 MG tablet, , Disp: , Rfl:   •  valsartan (DIOVAN) 160 MG tablet, Daily. 1.5 tablets daily, Disp: , Rfl:   •  vitamin B-12 (CYANOCOBALAMIN) 1000 MCG tablet, Take 1,000 mcg by mouth Daily., Disp: , Rfl:   •  vitamin C (ASCORBIC ACID) 500 MG tablet, Take 1,000 mg by mouth Daily., Disp: , Rfl:   •  vitamin D (ERGOCALCIFEROL) 36394 units capsule capsule, Take 50,000 Units by mouth 1 (One) Time Per Week., Disp: , Rfl:   •  XARELTO 20 MG tablet, , Disp: , Rfl:    Assessment:        Patient Active Problem List   Diagnosis   • Persistent atrial fibrillation   • Leg swelling   • Congestive heart failure   • Essential hypertension   • Anticoagulated      Interpretation Summary      · Findings consistent with a normal ECG stress test.  · Left ventricular ejection fraction is normal (Calculated EF = 60%).  · Myocardial perfusion imaging indicates a small-sized, mildly severe area of ischemia located in the apex and inferior wall.  · Impressions are consistent with an intermediate risk study.         Interpretation Summary   · Left atrial cavity size is mildly dilated.  · Mild mitral valve regurgitation is present  · Mild tricuspid valve regurgitation is present.  · Left Ventricle: Calculated EF = 57.9%.  · There is no evidence of pericardial effusion               Plan:            ICD-10-CM ICD-9-CM   1. Abnormal cardiac function test R94.30 794.30   2. Other chest pain  R07.89 786.59   3. Precordial pain  R07.2 786.51   4. Persistent atrial fibrillation I48.1 427.31   5. Essential hypertension I10 401.9     1. Abnormal cardiac function test  Procedure, risks and options of cardiac cath explained to pt INCLUDING BUT NOT LIMITED TO MI, STROKE, DEATH, INFECTION HAEMORRHAGE, . Pt understands well and agrees with no further questions.  - Case Request Cath Lab: Left Heart Cath  - CBC & Differential  - Basic Metabolic Panel  - aPTT  - Protime-INR    2. Other chest pain   Will need further ischemic workup  - aPTT    3. Precordial pain   Will proceed with ischemic workup  - Protime-INR    4. Persistent atrial fibrillation  Now in normal sinus rhythm    5. Essential hypertension  Well-controlled with current medications     Procedure, risks and options of cardiac cath explained to pt INCLUDING BUT NOT LIMITED TO MI, STROKE, DEATH, INFECTION HAEMORRHAGE, . Pt understands well and agrees with no further questions.  COUNSELING:    Tatyana Ayala was given to patient for the following topics: diagnostic results, risk factor reductions, impressions, risks and benefits of treatment options and importance of treatment compliance .       SMOKING COUNSELING:    Counseling  given: Not Answered      EMR Dragon/Transcription disclaimer:   Much of this encounter note is an electronic transcription/translation of spoken language to printed text. The electronic translation of spoken language may permit erroneous, or at times, nonsensical words or phrases to be inadvertently transcribed; Although I have reviewed the note for such errors, some may still exist.

## 2018-01-05 NOTE — PERIOPERATIVE NURSING NOTE
Reviewed and went over discharge instructions x2 with patient and son.  Clarified dates when medications were to be started and resumed.

## 2018-01-05 NOTE — PLAN OF CARE
Problem: Patient Care Overview (Adult)  Goal: Plan of Care Review  Outcome: Outcome(s) achieved Date Met: 01/05/18 01/05/18 1610   Coping/Psychosocial Response Interventions   Plan Of Care Reviewed With patient;wendy   Patient Care Overview   Progress improving     Goal: Adult Individualization and Mutuality  Outcome: Outcome(s) achieved Date Met: 01/05/18    Goal: Discharge Needs Assessment  Outcome: Outcome(s) achieved Date Met: 01/05/18      Problem: Cardiac Catheterization with/without PCI (Adult)  Goal: Signs and Symptoms of Listed Potential Problems Will be Absent or Manageable (Cardiac Catheterization with/without PCI)  Outcome: Outcome(s) achieved Date Met: 01/05/18 01/05/18 1610   Cardiac Catheterization with/without PCI   Problems Assessed (Cardiac Catheterization) all   Problems Present (Cardiac Catheterization) none

## 2018-01-05 NOTE — DISCHARGE INSTRUCTIONS
[facility]  [address] [place] [address]    Coronary Angiogram with Stent (Radial Approach) After Care    Refer to this sheet in the next few weeks. These instructions provide you with information on caring for yourself after your procedure. Your health care provider may also give you more specific instructions. Your treatment has been planned according to current medical practices, but problems sometimes occur. Call your health care provider if you have any problems or questions after your procedure.       Home Care Instructions:  · You may shower the day after the procedure. Remove the bandage (dressing) and gently wash the site with plain soap and water. Gently pat the site dry. You may apply a band aid daily for 2 days if desired.    · Do not apply powder or lotion to the site.  · Do not submerge the affected site in water for 3 to 5 days or until the site is completely healed.   · Do not flex or bend the affected arm for 24 hours.  · Do not lift, push or pull anything over 10 pounds for 2 days after your procedure.  · Do not operate machinery or power tools for 24 hours.  · Inspect the site at least twice daily. You may notice some bruising at the site and it may be tender for 1 to 2 weeks.     · Increase your fluid intake for the next 2 days.    · Keep arm elevated for 24 hours.  For the remainder of the day, keep your arm in the “Pledge of Allegiance” position when up and about.    · Limit your activity for the next 48 hours and avoid strenuous activity as directed by your physician.   · You may drive 24 hours after the procedure unless otherwise instructed by your caregiver.  · A responsible adult should be with you for the first 24 hours after you arrive home.   · Do not make any important legal decisions or sign legal papers for 24 hours.    · Take medicines only as directed by your health care provider.  Blood thinners may be prescribed after your procedure to improve blood flow  through the stent.    · Eat a heart-healthy diet. This should include plenty of fresh fruits and vegetables. Meat should be lean cuts. Avoid the following types of food:    ¨ Food that is high in salt.    ¨ Canned or highly processed food.    ¨ Food that is high in saturated fat or sugar.    ¨ Fried food.    · Make any other lifestyle changes recommended by your health care provider. This may include:    ¨ Not using any tobacco products including cigarettes, chewing tobacco, or electronic cigarettes.   ¨ Managing your weight.    ¨ Getting regular exercise.    ¨ Managing your blood pressure.    ¨ Limiting your alcohol intake.    ¨ Managing other health problems, such as diabetes.    · If you need an MRI after your heart stent was placed, be sure to tell the health care provider who orders the MRI that you have a heart stent.    · Keep all follow-up visits as directed by your health care provider.    ·   Call Your Doctor If:  · You have unusual pain at the radial/ulnar (wrist) site.  · You have redness, warmth, swelling, or pain at the radial/ulnar (wrist) site.  · You have drainage (other than a small amount of blood on the dressing).  · `You have chills or a fever > 101.  · Your arm becomes pale or dark, cool, tingly, or numb.  · You develop chest pain, shortness of breath, feel faint or pass out.    · You have heavy bleeding from the site, hold pressure on the site for 20 minutes.  If the bleeding stops, apply a fresh bandage and call your cardiologist.  However, if you continue to have bleeding, call 911.        Make Sure You:   · Understand these instructions.  · Will watch your condition.  · Will get help right away if you are not doing well or get worse.

## 2018-01-11 ENCOUNTER — OFFICE VISIT (OUTPATIENT)
Dept: CARDIOLOGY | Facility: CLINIC | Age: 68
End: 2018-01-11

## 2018-01-11 VITALS
DIASTOLIC BLOOD PRESSURE: 82 MMHG | SYSTOLIC BLOOD PRESSURE: 143 MMHG | BODY MASS INDEX: 28.82 KG/M2 | WEIGHT: 173 LBS | HEART RATE: 88 BPM | HEIGHT: 65 IN

## 2018-01-11 DIAGNOSIS — I10 ESSENTIAL HYPERTENSION: ICD-10-CM

## 2018-01-11 DIAGNOSIS — I25.10 CORONARY ARTERY DISEASE INVOLVING NATIVE HEART WITHOUT ANGINA PECTORIS, UNSPECIFIED VESSEL OR LESION TYPE: ICD-10-CM

## 2018-01-11 DIAGNOSIS — I50.9 CONGESTIVE HEART FAILURE, UNSPECIFIED CONGESTIVE HEART FAILURE CHRONICITY, UNSPECIFIED CONGESTIVE HEART FAILURE TYPE: ICD-10-CM

## 2018-01-11 DIAGNOSIS — I48.19 PERSISTENT ATRIAL FIBRILLATION (HCC): ICD-10-CM

## 2018-01-11 DIAGNOSIS — R07.9 CHEST PAIN, UNSPECIFIED TYPE: Primary | ICD-10-CM

## 2018-01-11 PROCEDURE — 99213 OFFICE O/P EST LOW 20 MIN: CPT | Performed by: INTERNAL MEDICINE

## 2018-01-11 RX ORDER — AMOXICILLIN 500 MG/1
1000 CAPSULE ORAL 2 TIMES DAILY
COMMUNITY
End: 2018-09-25 | Stop reason: ALTCHOICE

## 2018-01-11 RX ORDER — METOPROLOL SUCCINATE 25 MG/1
25 TABLET, EXTENDED RELEASE ORAL DAILY
COMMUNITY
End: 2019-12-10

## 2018-01-11 NOTE — PROGRESS NOTES
Subjective:       Tatyana Reyes is a 67 y.o. female who here for follow up    CC  Post pci follow up    Teeth hurting  HPI  67-year-old female with known history of atrial fibrillation, congestive heart failure, coronary artery disease with recent angioplasty stent here for the follow-up    Patient denies any chest pains or tightness in chest with no side effects from the procedure     Problem List Items Addressed This Visit        Cardiovascular and Mediastinum    Persistent atrial fibrillation    Relevant Medications    metoprolol succinate XL (TOPROL-XL) 25 MG 24 hr tablet    Congestive heart failure    Relevant Medications    metoprolol succinate XL (TOPROL-XL) 25 MG 24 hr tablet    Essential hypertension    Relevant Medications    metoprolol succinate XL (TOPROL-XL) 25 MG 24 hr tablet    Coronary artery disease involving native heart without angina pectoris    Relevant Medications    metoprolol succinate XL (TOPROL-XL) 25 MG 24 hr tablet      Other Visit Diagnoses     Chest pain, unspecified type    -  Primary    Relevant Orders    Treadmill Stress Test        .    The following portions of the patient's history were reviewed and updated as appropriate: allergies, current medications, past family history, past medical history, past social history, past surgical history and problem list.    Past Medical History:   Diagnosis Date   • Atrial fibrillation    • Atrial fibrillation    • CHF (congestive heart failure)    • Chronic kidney disease    • Disease of thyroid gland    • DVT (deep venous thrombosis) 09/08/2017    BILAT LE S/P SHOULDER REPLACEMENT   • Dyspnea    • Hypertension     reports that she has never smoked. She has never used smokeless tobacco. She reports that she does not drink alcohol or use illicit drugs.  Family History   Problem Relation Age of Onset   • Heart disease Mother    • Stroke Mother    • Diabetes Brother        Review of Systems  Constitutional: No wt loss, fever,  "fatigue  Gastrointestinal: No nausea, abdominal pain  Behavioral/Psych: No insomnia or anxiety   Cardiovascular No chest pains or tightness in the chest  Objective:       Physical Exam           Physical Exam  /82  Pulse 88  Ht 165.1 cm (65\")  Wt 78.5 kg (173 lb)  BMI 28.79 kg/m2    General appearance: NAD, conversant   Eyes: anicteric sclerae, moist conjunctivae; no lid-lag; PERRLA   HENT: Atraumatic; oropharynx clear with moist mucous membranes and no mucosal ulcerations;  normal hard and soft palate   Neck: Trachea midline; FROM, supple, no thyromegaly or lymphadenopathy   Lungs: CTA, with normal respiratory effort and no intercostal retractions   CV: S1-S2 regular, sys murmurs, no rub, no gallop   Abdomen: Soft, non-tender; no masses or HSM   Extremities: No peripheral edema or extremity lymphadenopathy  Skin: Normal temperature, turgor and texture; no rash, ulcers or subcutaneous nodules   Psych: Appropriate affect, alert and oriented to person, place and time           Cardiographics  @Procedures    Echocardiogram:        Current Outpatient Prescriptions:   •  amLODIPine (NORVASC) 10 MG tablet, Take 5 mg by mouth Daily. TAKE 1/2 TABLET DAILY, Disp: , Rfl:   •  amoxicillin (AMOXIL) 500 MG capsule, Take 1,000 mg by mouth 2 (Two) Times a Day., Disp: , Rfl:   •  aspirin  MG tablet, Take 1 tablet by mouth Daily., Disp: , Rfl:   •  clopidogrel (PLAVIX) 75 MG tablet, Take 1 tablet by mouth Daily., Disp: 30 tablet, Rfl: 11  •  furosemide (LASIX) 20 MG tablet, Take 1 tablet by mouth Daily., Disp: 90 tablet, Rfl: 3  •  levothyroxine (SYNTHROID, LEVOTHROID) 50 MCG tablet, Take 50 mcg by mouth Daily., Disp: , Rfl:   •  Multiple Vitamins-Minerals (CENTRUM SILVER 50+WOMEN) tablet, Take 1 tablet by mouth Daily., Disp: , Rfl:   •  omeprazole (priLOSEC) 40 MG capsule, Take 40 mg by mouth Daily., Disp: , Rfl:   •  valsartan (DIOVAN) 160 MG tablet, Take 240 mg by mouth Daily. 1.5 tablets daily, Disp: , Rfl:   •  " vitamin D (ERGOCALCIFEROL) 00224 units capsule capsule, Take 50,000 Units by mouth 1 (One) Time Per Week. WEEKLY ON SUNDAYS, Disp: , Rfl:   •  XARELTO 20 MG tablet, Take 1 tablet by mouth Daily With Dinner., Disp: 30 tablet, Rfl:    Assessment:        Patient Active Problem List   Diagnosis   • Persistent atrial fibrillation   • Leg swelling   • Congestive heart failure   • Essential hypertension   • Anticoagulated   • Abnormal cardiac function test     HEMODYNAMIC / ANGIOGRAPHIC DATA:    1. Left ventricular end diastolic pressure was 10 mmHg.  2. Left ventriculography revealed an EF around 50%.    3. The left main is normal.  4. The left anterior descending artery is proximal 80% reduced to 0% with 3.0/15 dilated to 3.2  Vision stent.  5. The left circumflex is codominant and normal.  6. The right coronary artery is codominant and normal.  7. Successful stenting of the proximal LAD, with reduction of stenosis from 80% to 0% using 3.0/15 dilated to 3.2.     KEENA FLOW    PRE... 3....       POST.  3.....     TYPE OF LESION...... B  1.......          Plan:          ICD-10-CM ICD-9-CM   1. Chest pain, unspecified type R07.9 786.50   2. Persistent atrial fibrillation I48.1 427.31   3. Congestive heart failure, unspecified congestive heart failure chronicity, unspecified congestive heart failure type I50.9 428.0   4. Essential hypertension I10 401.9   5. Coronary artery disease involving native heart without angina pectoris, unspecified vessel or lesion type I25.10 414.01     1. Chest pain, unspecified type  Considering recent angioplasty and stent patient will have the stress test done  - Treadmill Stress Test; Future    2. Persistent atrial fibrillation  Under control    3. Congestive heart failure, unspecified congestive heart failure chronicity, unspecified congestive heart failure type  Compensated    4. Essential hypertension  Blood pressure under control    5. Coronary artery disease involving native heart without  angina pectoris, unspecified vessel or lesion type  No angina pectoralis     Pt post PCI/STENT    Vascular site has been examined has no complications, patient has been advised to call us if there is significant pain with the swelling at the site of entry for the procedure    Tatyana Reyes has been advised to look for signs of stent thrombosis and stent restenosis with chest pain, sob     Cardiac risk reduction for reducing progression of disease has been explained    Importance of taking dual antiplatelets for one year  has been explained, risk of stent thrombosis leading to the acute MI, which carries high morbidity and mortality has been explained    Discontinuation or interruptions of these medications should be under the strict guidance of appropriate health professional          COUNSELING:    Tatyana GoldtCounseling was given to patient for the following topics: diagnostic results, risk factor reductions, impressions, risks and benefits of treatment options and importance of treatment compliance .       SMOKING COUNSELING:    Counseling given: Not Answered      EMR Dragon/Transcription disclaimer:   Much of this encounter note is an electronic transcription/translation of spoken language to printed text. The electronic translation of spoken language may permit erroneous, or at times, nonsensical words or phrases to be inadvertently transcribed; Although I have reviewed the note for such errors, some may still exist.

## 2018-01-16 ENCOUNTER — TELEPHONE (OUTPATIENT)
Dept: CARDIOLOGY | Facility: CLINIC | Age: 68
End: 2018-01-16

## 2018-01-16 NOTE — TELEPHONE ENCOUNTER
----- Message from Tatyana Reyes sent at 1/9/2018 10:21 AM EST -----  Regarding: Prescription Question  Contact: [phone]  I iI am going to need to have some dental work done within the next couple of weeks. Not sure if 2 root canals or 2 pulling. I had shoulder replacement 4 months ago and dentist says I need an antibiotic before they will even clean my teeth. Since I just had a stent and am presently taking plavix and xarelto xarelto and aspirin what antibiotic can you prescribe for me to take for this dental work?  It will take more than one visit to dentist.           Pt was in office 1/11 to discuss

## 2018-01-23 PROBLEM — I25.10 CORONARY ARTERY DISEASE INVOLVING NATIVE HEART WITHOUT ANGINA PECTORIS: Status: ACTIVE | Noted: 2018-01-23

## 2018-02-01 ENCOUNTER — APPOINTMENT (OUTPATIENT)
Dept: CARDIOLOGY | Facility: HOSPITAL | Age: 68
End: 2018-02-01
Attending: INTERNAL MEDICINE

## 2018-02-01 ENCOUNTER — OFFICE VISIT (OUTPATIENT)
Dept: CARDIOLOGY | Facility: CLINIC | Age: 68
End: 2018-02-01

## 2018-02-01 VITALS
HEIGHT: 65 IN | BODY MASS INDEX: 28.66 KG/M2 | SYSTOLIC BLOOD PRESSURE: 133 MMHG | WEIGHT: 172 LBS | DIASTOLIC BLOOD PRESSURE: 76 MMHG | HEART RATE: 81 BPM

## 2018-02-01 DIAGNOSIS — R94.31 ABNORMAL EKG: Primary | ICD-10-CM

## 2018-02-01 DIAGNOSIS — I10 ESSENTIAL HYPERTENSION: ICD-10-CM

## 2018-02-01 DIAGNOSIS — T73.3XXA FATIGUE DUE TO EXCESSIVE EXERTION, INITIAL ENCOUNTER: ICD-10-CM

## 2018-02-01 DIAGNOSIS — I50.9 CONGESTIVE HEART FAILURE, UNSPECIFIED CONGESTIVE HEART FAILURE CHRONICITY, UNSPECIFIED CONGESTIVE HEART FAILURE TYPE: ICD-10-CM

## 2018-02-01 DIAGNOSIS — I25.10 CORONARY ARTERY DISEASE INVOLVING NATIVE HEART WITHOUT ANGINA PECTORIS, UNSPECIFIED VESSEL OR LESION TYPE: ICD-10-CM

## 2018-02-01 DIAGNOSIS — I48.19 PERSISTENT ATRIAL FIBRILLATION (HCC): ICD-10-CM

## 2018-02-01 PROCEDURE — 99213 OFFICE O/P EST LOW 20 MIN: CPT | Performed by: INTERNAL MEDICINE

## 2018-02-01 RX ORDER — CALCIUM CARBONATE 200(500)MG
2 TABLET,CHEWABLE ORAL DAILY
COMMUNITY
End: 2018-09-25 | Stop reason: ALTCHOICE

## 2018-02-01 RX ORDER — RANITIDINE HCL 75 MG
75 TABLET ORAL 2 TIMES DAILY
COMMUNITY
End: 2018-09-25 | Stop reason: ALTCHOICE

## 2018-02-01 NOTE — PROGRESS NOTES
Subjective:       Tatyana Reyes is a 67 y.o. female who here for follow up    CC  The follow-up for the coronary artery disease as well as congestive heart failure  HPI  67-year-old female with known history of atrial fibrillation coronary artery disease recently underwent angioplasty as well as a stent without pain function complications continues to complains of fatigue but no chest pains or shortness of breath     Problem List Items Addressed This Visit        Cardiovascular and Mediastinum    Persistent atrial fibrillation    Congestive heart failure    Relevant Orders    Treadmill Stress Test    CBC & Differential    Basic Metabolic Panel    Thyroid Panel With TSH    Essential hypertension    Coronary artery disease involving native heart without angina pectoris    Abnormal EKG - Primary    Relevant Orders    Treadmill Stress Test    CBC & Differential    Basic Metabolic Panel    Thyroid Panel With TSH       Other    Fatigue due to excessive exertion    Relevant Orders    Treadmill Stress Test    CBC & Differential    Basic Metabolic Panel    Thyroid Panel With TSH        .    The following portions of the patient's history were reviewed and updated as appropriate: allergies, current medications, past family history, past medical history, past social history, past surgical history and problem list.    Past Medical History:   Diagnosis Date   • Atrial fibrillation    • Atrial fibrillation    • CHF (congestive heart failure)    • Chronic kidney disease    • Disease of thyroid gland    • DVT (deep venous thrombosis) 09/08/2017    BILAT LE S/P SHOULDER REPLACEMENT   • Dyspnea    • Hypertension     reports that she has never smoked. She has never used smokeless tobacco. She reports that she does not drink alcohol or use illicit drugs.  Family History   Problem Relation Age of Onset   • Heart disease Mother    • Stroke Mother    • Diabetes Brother        Review of Systems  Constitutional: No wt loss, fever,  "fatigue  Gastrointestinal: No nausea, abdominal pain  Behavioral/Psych: No insomnia or anxiety   Cardiovascular Fatigue no chest pain  Objective:       Physical Exam           Physical Exam  /76  Pulse 81  Ht 165.1 cm (65\")  Wt 78 kg (172 lb)  BMI 28.62 kg/m2    General appearance: NAD, conversant   Eyes: anicteric sclerae, moist conjunctivae; no lid-lag; PERRLA   HENT: Atraumatic; oropharynx clear with moist mucous membranes and no mucosal ulcerations;  normal hard and soft palate   Neck: Trachea midline; FROM, supple, no thyromegaly or lymphadenopathy   Lungs: CTA, with normal respiratory effort and no intercostal retractions   CV: S1-S2 regular, no murmurs, no rub, no gallop   Abdomen: Soft, non-tender; no masses or HSM   Extremities: No peripheral edema or extremity lymphadenopathy  Skin: Normal temperature, turgor and texture; no rash, ulcers or subcutaneous nodules   Psych: Appropriate affect, alert and oriented to person, place and time           Cardiographics  @Procedures    Echocardiogram:        Current Outpatient Prescriptions:   •  amLODIPine (NORVASC) 10 MG tablet, Take 5 mg by mouth Daily. TAKE 1/2 TABLET DAILY, Disp: , Rfl:   •  calcium carbonate (TUMS) 500 MG chewable tablet, Chew 2 tablets Daily., Disp: , Rfl:   •  clopidogrel (PLAVIX) 75 MG tablet, Take 1 tablet by mouth Daily., Disp: 30 tablet, Rfl: 11  •  furosemide (LASIX) 20 MG tablet, Take 1 tablet by mouth Daily., Disp: 90 tablet, Rfl: 3  •  levothyroxine (SYNTHROID, LEVOTHROID) 50 MCG tablet, Take 50 mcg by mouth Daily., Disp: , Rfl:   •  metoprolol succinate XL (TOPROL-XL) 25 MG 24 hr tablet, Take 25 mg by mouth Daily., Disp: , Rfl:   •  Multiple Vitamins-Minerals (CENTRUM SILVER 50+WOMEN) tablet, Take 1 tablet by mouth Daily., Disp: , Rfl:   •  raNITIdine (ZANTAC) 75 MG tablet, Take 75 mg by mouth 2 (Two) Times a Day., Disp: , Rfl:   •  valsartan (DIOVAN) 160 MG tablet, Take 240 mg by mouth Daily. 1.5 tablets daily, Disp: , Rfl: "   •  vitamin D (ERGOCALCIFEROL) 95930 units capsule capsule, Take 50,000 Units by mouth 1 (One) Time Per Week. WEEKLY ON SUNDAYS, Disp: , Rfl:   •  XARELTO 20 MG tablet, Take 1 tablet by mouth Daily With Dinner., Disp: 30 tablet, Rfl:   •  amoxicillin (AMOXIL) 500 MG capsule, Take 1,000 mg by mouth 2 (Two) Times a Day., Disp: , Rfl:   •  aspirin  MG tablet, Take 1 tablet by mouth Daily., Disp: , Rfl:    Assessment:        Patient Active Problem List   Diagnosis   • Persistent atrial fibrillation   • Leg swelling   • Congestive heart failure   • Essential hypertension   • Anticoagulated   • Abnormal cardiac function test   • Coronary artery disease involving native heart without angina pectoris         HEMODYNAMIC / ANGIOGRAPHIC DATA:    1. Left ventricular end diastolic pressure was 10 mmHg.  2. Left ventriculography revealed an EF around 50%.    3. The left main is normal.  4. The left anterior descending artery is proximal 80% reduced to 0% with 3.0/15 dilated to 3.2  Vision stent.  5. The left circumflex is codominant and normal.  6. The right coronary artery is codominant and normal.  7. Successful stenting of the proximal LAD, with reduction of stenosis from 80% to 0% using 3.0/15 dilated to 3.2.     KEENA FLOW    PRE... 3....       POST.  3.....     TYPE OF LESION...... B  1.......         Plan:            ICD-10-CM ICD-9-CM   1. Abnormal EKG R94.31 794.31   2. Congestive heart failure, unspecified congestive heart failure chronicity, unspecified congestive heart failure type I50.9 428.0   3. Fatigue due to excessive exertion, initial encounter T73.3XXA 994.5   4. Coronary artery disease involving native heart without angina pectoris, unspecified vessel or lesion type I25.10 414.01   5. Essential hypertension I10 401.9   6. Persistent atrial fibrillation I48.1 427.31     1. Abnormal EKG  Post angioplasty will proceed with a ETT  - Treadmill Stress Test  - CBC & Differential  - Basic Metabolic Panel  -  Thyroid Panel With TSH    2. Congestive heart failure, unspecified congestive heart failure chronicity, unspecified congestive heart failure type  Compensated  - Treadmill Stress Test  - CBC & Differential  - Basic Metabolic Panel  - Thyroid Panel With TSH    3. Fatigue due to excessive exertion, initial encounter  Multifactorial  - Treadmill Stress Test  - CBC & Differential  - Basic Metabolic Panel  - Thyroid Panel With TSH    4. Coronary artery disease involving native heart without angina pectoris, unspecified vessel or lesion type  No angina recently and plasty and stent    5. Essential hypertension  Blood pressure under control    6. Persistent atrial fibrillation  Under control     NO XARALTTO  START ASA    PROS AND CONS , HAS BEEN DISCUSSED    SEE US 2 MONTHS WHEN WILL STOPP PLAVIX AND START XARALTTO    Pros and cons of this new medication / change medication has been explained to  the patient    Possible side effects has been explained    Associated need of the blood  Work has been explained    Need for the compliance of the medication has been explained      START EXERCISE    CBC, BMP, TSH    SEE US 1 MONTH  WITH ETT  COUNSELING:    Tatyana GoldtCounseling was given to patient for the following topics: diagnostic results, risk factor reductions, impressions, risks and benefits of treatment options and importance of treatment compliance .       SMOKING COUNSELING:    Counseling given: Not Answered      EMR Dragon/Transcription disclaimer:   Much of this encounter note is an electronic transcription/translation of spoken language to printed text. The electronic translation of spoken language may permit erroneous, or at times, nonsensical words or phrases to be inadvertently transcribed; Although I have reviewed the note for such errors, some may still exist.

## 2018-02-10 PROBLEM — R94.31 ABNORMAL EKG: Status: ACTIVE | Noted: 2018-02-10

## 2018-02-10 PROBLEM — T73.3XXA FATIGUE DUE TO EXCESSIVE EXERTION: Status: ACTIVE | Noted: 2018-02-10

## 2018-02-14 ENCOUNTER — HOSPITAL ENCOUNTER (OUTPATIENT)
Dept: CARDIOLOGY | Facility: HOSPITAL | Age: 68
Discharge: HOME | End: 2018-02-14

## 2018-02-14 ENCOUNTER — HOSPITAL ENCOUNTER (OUTPATIENT)
Dept: CARDIOLOGY | Facility: HOSPITAL | Age: 68
Discharge: HOME | End: 2018-02-14
Attending: INTERNAL MEDICINE | Admitting: INTERNAL MEDICINE

## 2018-02-14 VITALS — DIASTOLIC BLOOD PRESSURE: 62 MMHG | SYSTOLIC BLOOD PRESSURE: 132 MMHG | HEART RATE: 74 BPM

## 2018-02-14 LAB
ANION GAP SERPL CALCULATED.3IONS-SCNC: 11.3 MMOL/L
BASOPHILS # BLD AUTO: 0.04 10*3/MM3 (ref 0–0.2)
BASOPHILS NFR BLD AUTO: 0.7 % (ref 0–1.5)
BUN BLD-MCNC: 36 MG/DL (ref 8–23)
BUN/CREAT SERPL: 25 (ref 7–25)
CALCIUM SPEC-SCNC: 9.6 MG/DL (ref 8.6–10.5)
CHLORIDE SERPL-SCNC: 100 MMOL/L (ref 98–107)
CO2 SERPL-SCNC: 26.7 MMOL/L (ref 22–29)
CREAT BLD-MCNC: 1.44 MG/DL (ref 0.57–1)
DEPRECATED RDW RBC AUTO: 56.2 FL (ref 37–54)
EOSINOPHIL # BLD AUTO: 0.09 10*3/MM3 (ref 0–0.7)
EOSINOPHIL NFR BLD AUTO: 1.5 % (ref 0.3–6.2)
ERYTHROCYTE [DISTWIDTH] IN BLOOD BY AUTOMATED COUNT: 15.9 % (ref 11.7–13)
GFR SERPL CREATININE-BSD FRML MDRD: 36 ML/MIN/1.73
GLUCOSE BLD-MCNC: 96 MG/DL (ref 65–99)
HCT VFR BLD AUTO: 37.7 % (ref 35.6–45.5)
HGB BLD-MCNC: 11.5 G/DL (ref 11.9–15.5)
IMM GRANULOCYTES # BLD: 0.02 10*3/MM3 (ref 0–0.03)
IMM GRANULOCYTES NFR BLD: 0.3 % (ref 0–0.5)
LYMPHOCYTES # BLD AUTO: 2.18 10*3/MM3 (ref 0.9–4.8)
LYMPHOCYTES NFR BLD AUTO: 36.3 % (ref 19.6–45.3)
MCH RBC QN AUTO: 29.1 PG (ref 26.9–32)
MCHC RBC AUTO-ENTMCNC: 30.5 G/DL (ref 32.4–36.3)
MCV RBC AUTO: 95.4 FL (ref 80.5–98.2)
MONOCYTES # BLD AUTO: 0.46 10*3/MM3 (ref 0.2–1.2)
MONOCYTES NFR BLD AUTO: 7.7 % (ref 5–12)
NEUTROPHILS # BLD AUTO: 3.21 10*3/MM3 (ref 1.9–8.1)
NEUTROPHILS NFR BLD AUTO: 53.5 % (ref 42.7–76)
PLATELET # BLD AUTO: 304 10*3/MM3 (ref 140–500)
PMV BLD AUTO: 9.4 FL (ref 6–12)
POTASSIUM BLD-SCNC: 4.6 MMOL/L (ref 3.5–5.2)
RBC # BLD AUTO: 3.95 10*6/MM3 (ref 3.9–5.2)
SODIUM BLD-SCNC: 138 MMOL/L (ref 136–145)
T-UPTAKE NFR SERPL: 1.1 TBI (ref 0.8–1.3)
T4 SERPL-MCNC: 7.37 MCG/DL (ref 4.5–11.7)
TSH SERPL DL<=0.05 MIU/L-ACNC: 4.13 MIU/ML (ref 0.27–4.2)
WBC NRBC COR # BLD: 6 10*3/MM3 (ref 4.5–10.7)

## 2018-02-14 PROCEDURE — 93017 CV STRESS TEST TRACING ONLY: CPT

## 2018-02-14 PROCEDURE — 93016 CV STRESS TEST SUPVJ ONLY: CPT | Performed by: INTERNAL MEDICINE

## 2018-02-14 PROCEDURE — 84443 ASSAY THYROID STIM HORMONE: CPT | Performed by: INTERNAL MEDICINE

## 2018-02-14 PROCEDURE — 84479 ASSAY OF THYROID (T3 OR T4): CPT | Performed by: INTERNAL MEDICINE

## 2018-02-14 PROCEDURE — 36415 COLL VENOUS BLD VENIPUNCTURE: CPT | Performed by: INTERNAL MEDICINE

## 2018-02-14 PROCEDURE — 93018 CV STRESS TEST I&R ONLY: CPT | Performed by: INTERNAL MEDICINE

## 2018-02-14 PROCEDURE — 80048 BASIC METABOLIC PNL TOTAL CA: CPT | Performed by: INTERNAL MEDICINE

## 2018-02-14 PROCEDURE — 84436 ASSAY OF TOTAL THYROXINE: CPT | Performed by: INTERNAL MEDICINE

## 2018-02-14 PROCEDURE — 85025 COMPLETE CBC W/AUTO DIFF WBC: CPT | Performed by: INTERNAL MEDICINE

## 2018-02-19 LAB
BH CV STRESS BP STAGE 1: NORMAL
BH CV STRESS BP STAGE 2: NORMAL
BH CV STRESS DURATION MIN STAGE 1: 3
BH CV STRESS DURATION MIN STAGE 2: 1
BH CV STRESS DURATION SEC STAGE 1: 0
BH CV STRESS DURATION SEC STAGE 2: 14
BH CV STRESS GRADE STAGE 1: 0
BH CV STRESS GRADE STAGE 2: 5
BH CV STRESS HR STAGE 1: 82
BH CV STRESS HR STAGE 2: 105
BH CV STRESS METS STAGE 1: 2.3
BH CV STRESS METS STAGE 2: 3.8
BH CV STRESS PROTOCOL 1: NORMAL
BH CV STRESS RECOVERY BP: NORMAL MMHG
BH CV STRESS RECOVERY HR: 85 BPM
BH CV STRESS SPEED STAGE 1: 1.7
BH CV STRESS SPEED STAGE 2: 1.7
BH CV STRESS STAGE 1: 1
BH CV STRESS STAGE 2: 2
MAXIMAL PREDICTED HEART RATE: 150 BPM
PERCENT MAX PREDICTED HR: 70 %
STRESS BASELINE BP: NORMAL MMHG
STRESS BASELINE HR: 73 BPM
STRESS PERCENT HR: 82 %
STRESS POST ESTIMATED WORKLOAD: 3.8 METS
STRESS POST EXERCISE DUR MIN: 7 MIN
STRESS POST EXERCISE DUR SEC: 14 SEC
STRESS POST PEAK BP: NORMAL MMHG
STRESS POST PEAK HR: 105 BPM
STRESS TARGET HR: 128 BPM

## 2018-03-27 ENCOUNTER — OFFICE VISIT (OUTPATIENT)
Dept: CARDIOLOGY | Facility: CLINIC | Age: 68
End: 2018-03-27

## 2018-03-27 VITALS
DIASTOLIC BLOOD PRESSURE: 74 MMHG | WEIGHT: 176 LBS | SYSTOLIC BLOOD PRESSURE: 122 MMHG | BODY MASS INDEX: 29.32 KG/M2 | HEIGHT: 65 IN | HEART RATE: 55 BPM

## 2018-03-27 DIAGNOSIS — I48.19 PERSISTENT ATRIAL FIBRILLATION (HCC): Primary | ICD-10-CM

## 2018-03-27 DIAGNOSIS — I10 ESSENTIAL HYPERTENSION: ICD-10-CM

## 2018-03-27 DIAGNOSIS — I50.9 CONGESTIVE HEART FAILURE, UNSPECIFIED CONGESTIVE HEART FAILURE CHRONICITY, UNSPECIFIED CONGESTIVE HEART FAILURE TYPE: ICD-10-CM

## 2018-03-27 DIAGNOSIS — I25.10 CORONARY ARTERY DISEASE INVOLVING NATIVE HEART WITHOUT ANGINA PECTORIS, UNSPECIFIED VESSEL OR LESION TYPE: ICD-10-CM

## 2018-03-27 PROCEDURE — 99213 OFFICE O/P EST LOW 20 MIN: CPT | Performed by: INTERNAL MEDICINE

## 2018-03-27 RX ORDER — TRAMADOL HYDROCHLORIDE 50 MG/1
TABLET ORAL
COMMUNITY
Start: 2018-03-23 | End: 2018-09-25 | Stop reason: ALTCHOICE

## 2018-03-28 ENCOUNTER — TELEPHONE (OUTPATIENT)
Dept: CARDIOLOGY | Facility: CLINIC | Age: 68
End: 2018-03-28

## 2018-03-28 NOTE — TELEPHONE ENCOUNTER
PATIENT HAS A NEW RX FOR PLAVIX WANTS TO FINISH IT FIRST BEFORE STARING BACK ON XARELTO -    SPOKE WITH DR ARCOS AND HE SAID ITS OKAY TO DO THIS -PATIENT WAS TOLD

## 2018-07-06 ENCOUNTER — TELEPHONE (OUTPATIENT)
Dept: CARDIOLOGY | Facility: CLINIC | Age: 68
End: 2018-07-06

## 2018-07-06 NOTE — TELEPHONE ENCOUNTER
----- Message from Talia Mahmood sent at 7/5/2018  1:44 PM EDT -----  Regarding: STOPPING BLOOD THINNER PRIOR TO EPIDURAL   DR GERMÁN DAVIS OFFICE IS REQUESTING PERMISSION TO STOP BLOOD THINNER 3-5 DAYS PRIOR TO HER EPIDURAL PLEASE CALL BART [phone]     Pt is unable to hold blood thinner due to stent placement in 1/18

## 2018-07-06 NOTE — TELEPHONE ENCOUNTER
ANKLES SWELLING X 2-3 WEEKS, SWELLING GOES DOWN AT NIGHT.   CURRENTLY TAKING FUROSEMIDE 20 MG.   HAS ALSO BEEN HAVING LIGHTHEADED/DIZZY SPELLS.    CB# [phone]

## 2018-07-13 NOTE — TELEPHONE ENCOUNTER
Per Dr Ramesh pt to keep feet elevated watch salt intake.    Pt would like to know if she should take a potassium due to cramps in legs.     Pt also made appt for 7/30 to discuss medications

## 2018-07-17 NOTE — TELEPHONE ENCOUNTER
Per Dr Gadiel lopes to take over the count potassium.       Called l/m with information and call back info

## 2018-09-25 ENCOUNTER — OFFICE VISIT (OUTPATIENT)
Dept: CARDIOLOGY | Facility: CLINIC | Age: 68
End: 2018-09-25

## 2018-09-25 VITALS
HEART RATE: 71 BPM | DIASTOLIC BLOOD PRESSURE: 76 MMHG | BODY MASS INDEX: 28.82 KG/M2 | SYSTOLIC BLOOD PRESSURE: 138 MMHG | WEIGHT: 173 LBS | HEIGHT: 65 IN

## 2018-09-25 DIAGNOSIS — Z79.01 ANTICOAGULATED: ICD-10-CM

## 2018-09-25 DIAGNOSIS — I25.10 CORONARY ARTERY DISEASE INVOLVING NATIVE HEART WITHOUT ANGINA PECTORIS, UNSPECIFIED VESSEL OR LESION TYPE: ICD-10-CM

## 2018-09-25 DIAGNOSIS — I48.19 PERSISTENT ATRIAL FIBRILLATION (HCC): Primary | ICD-10-CM

## 2018-09-25 DIAGNOSIS — I10 ESSENTIAL HYPERTENSION: ICD-10-CM

## 2018-09-25 PROCEDURE — 99214 OFFICE O/P EST MOD 30 MIN: CPT | Performed by: INTERNAL MEDICINE

## 2018-09-25 RX ORDER — ASPIRIN 81 MG/1
81 TABLET, CHEWABLE ORAL DAILY
COMMUNITY
End: 2019-07-12

## 2018-09-25 RX ORDER — TIZANIDINE 4 MG/1
TABLET ORAL
COMMUNITY
Start: 2018-09-14

## 2018-09-25 RX ORDER — BENZONATATE 100 MG/1
CAPSULE ORAL
Refills: 0 | COMMUNITY
Start: 2018-07-13 | End: 2018-12-17

## 2018-09-25 RX ORDER — OMEPRAZOLE 40 MG/1
CAPSULE, DELAYED RELEASE ORAL
COMMUNITY
Start: 2018-09-14

## 2018-09-25 RX ORDER — RIVAROXABAN 20 MG/1
15 TABLET, FILM COATED ORAL
COMMUNITY
Start: 2018-09-13 | End: 2018-12-17 | Stop reason: ALTCHOICE

## 2018-09-25 NOTE — PROGRESS NOTES
" Subjective:       Tatyana Reyes is a 68 y.o. female who here for follow up    CC  Increasing bruising  HPI  68 years old female with atrial fibrillation on chronic anticoagulation with a history of coronary artery disease as well as benign essential arterial hypertension has been complaining of increasing bruising over the last several weeks    Patient denies any chest pains or tightness in chest no heaviness with a pressure sensation     Problem List Items Addressed This Visit        Cardiovascular and Mediastinum    Persistent atrial fibrillation (CMS/HCC) - Primary    Essential hypertension    Coronary artery disease involving native heart without angina pectoris       Hematopoietic and Hemostatic    Anticoagulated        .    The following portions of the patient's history were reviewed and updated as appropriate: allergies, current medications, past family history, past medical history, past social history, past surgical history and problem list.    Past Medical History:   Diagnosis Date   • Atrial fibrillation (CMS/HCC)    • Atrial fibrillation (CMS/HCC)    • CHF (congestive heart failure) (CMS/HCC)    • Chronic kidney disease    • Disease of thyroid gland    • DVT (deep venous thrombosis) (CMS/[facility]) 09/08/2017    BILAT LE S/P SHOULDER REPLACEMENT   • Dyspnea    • Hypertension     reports that she has never smoked. She has never used smokeless tobacco. She reports that she does not drink alcohol or use drugs.  Family History   Problem Relation Age of Onset   • Heart disease Mother    • Stroke Mother    • Diabetes Brother        Review of Systems  Constitutional: No wt loss, fever, fatigue  Gastrointestinal: No nausea, abdominal pain  Behavioral/Psych: No insomnia or anxiety   Cardiovascular no chest pains or tightness in chest  Objective:                 Physical Exam  /76 (BP Location: Right arm, Patient Position: Sitting)   Pulse 71   Ht 165.1 cm (65\")   Wt 78.5 kg (173 lb)   BMI 28.79 kg/m² "     General appearance: NAD, conversant   Eyes: anicteric sclerae, moist conjunctivae; no lid-lag; PERRLA   HENT: Atraumatic; oropharynx clear with moist mucous membranes and no mucosal ulcerations;  normal hard and soft palate   Neck: Trachea midline; FROM, supple, no thyromegaly or lymphadenopathy   Lungs: CTA, with normal respiratory effort and no intercostal retractions   CV: S1-S2 regular, no murmurs, no rub, no gallop   Abdomen: Soft, non-tender; no masses or HSM   Extremities: No peripheral edema or extremity lymphadenopathy  Skin: Normal temperature, turgor and texture; no rash, ulcers or subcutaneous nodules   Psych: Appropriate affect, alert and oriented to person, place and time           Cardiographics  @Procedures    Echocardiogram:        Current Outpatient Prescriptions:   •  amLODIPine (NORVASC) 10 MG tablet, Take 5 mg by mouth Daily. TAKE 1/2 TABLET DAILY, Disp: , Rfl:   •  aspirin  MG tablet, Take 1 tablet by mouth Daily. (Patient taking differently: Take 81 mg by mouth Daily.), Disp: , Rfl:   •  benzonatate (TESSALON) 100 MG capsule, TAKE 1 CAPSULE BY ORAL ROUTE AS NEEDED AS DIRECTED MAX OF 3 TIMES A DAY FOR 30 DAYS, Disp: , Rfl: 0  •  furosemide (LASIX) 20 MG tablet, Take 1 tablet by mouth Daily., Disp: 90 tablet, Rfl: 3  •  levothyroxine (SYNTHROID, LEVOTHROID) 50 MCG tablet, Take 50 mcg by mouth Daily., Disp: , Rfl:   •  metoprolol succinate XL (TOPROL-XL) 25 MG 24 hr tablet, Take 25 mg by mouth Daily., Disp: , Rfl:   •  Multiple Vitamins-Minerals (CENTRUM SILVER 50+WOMEN) tablet, Take 1 tablet by mouth Daily., Disp: , Rfl:   •  tiZANidine (ZANAFLEX) 4 MG tablet, , Disp: , Rfl:   •  valsartan (DIOVAN) 160 MG tablet, Take 240 mg by mouth Daily. 1.5 tablets daily, Disp: , Rfl:   •  XARELTO 20 MG tablet, , Disp: , Rfl:   •  omeprazole (priLOSEC) 40 MG capsule, , Disp: , Rfl:    Assessment:        Patient Active Problem List   Diagnosis   • Persistent atrial fibrillation (CMS/HCC)   • Leg  swelling   • Congestive heart failure (CMS/HCC)   • Essential hypertension   • Anticoagulated   • Abnormal cardiac function test   • Coronary artery disease involving native heart without angina pectoris   • Fatigue due to excessive exertion   • Abnormal EKG               Plan:            ICD-10-CM ICD-9-CM   1. Persistent atrial fibrillation (CMS/HCC) I48.1 427.31   2. Essential hypertension I10 401.9   3. Coronary artery disease involving native heart without angina pectoris, unspecified vessel or lesion type I25.10 414.01   4. Anticoagulated Z79.01 V58.61     1. Persistent atrial fibrillation (CMS/HCC)  Atrial fibrillation with controlled rate    2. Essential hypertension  Importance of controlling hypertension and blood pressure  checkup on the regular basis has been explained  Hypertension as a silent killer has been discussed  Risk reduction of the weight and regular exercises to control the hypertension has been explained      3. Coronary artery disease involving native heart without angina pectoris, unspecified vessel or lesion type  Tatyana Reyes with coronary artery disease has no angina pectoris    Risk reduction for the coronary artery disease, controlling the blood pressure, blood sugar management, cholesterol management, exercise, stress management, and proper compliance with medications and follow-up has been discussed      4. Anticoagulated  We'll reduce the dose as the patient GFR has significantly reduced       REDUCE XARALTTO TO 15 MG    REDUCE ASA TO 81 MG    REDUCE VALSARTAN  MG    SEE IN 3 MONTH  COUNSELING:    Tatyana GoldtCounseling was given to patient for the following topics: diagnostic results, risk factor reductions, impressions, risks and benefits of treatment options and importance of treatment compliance .       SMOKING COUNSELING:    Counseling given: Not Answered      EMR Dragon/Transcription disclaimer:   Much of this encounter note is an electronic  transcription/translation of spoken language to printed text. The electronic translation of spoken language may permit erroneous, or at times, nonsensical words or phrases to be inadvertently transcribed; Although I have reviewed the note for such errors, some may still exist.

## 2018-11-08 ENCOUNTER — TELEPHONE (OUTPATIENT)
Dept: CARDIOLOGY | Facility: CLINIC | Age: 68
End: 2018-11-08

## 2018-11-08 NOTE — TELEPHONE ENCOUNTER
----- Message from Lynne Brooks MA sent at 11/2/2018  4:16 PM EDT -----      ----- Message -----  From: Talia Mahmood  Sent: 10/31/2018  10:28 AM  To: Augie Vel[facility] [place] Clinical Pool    DR GARY NEEDS SURG CLEARANCE & TO HOLD MEDS.  PLS  CALL EDGAR [phone] OR [phone]

## 2018-11-08 NOTE — TELEPHONE ENCOUNTER
----- Message from Lynne Brooks MA sent at 11/2/2018  4:16 PM EDT -----      ----- Message -----  From: Talia Mahmood  Sent: 10/31/2018  10:28 AM  To: Augie Velásquez [facility] [place] Clinical Pool    DR GARY NEEDS SURG CLEARANCE & TO HOLD MEDS.  PLS  CALL EDGAR [phone] OR [phone]    Informed pt ok to have colonoscopy

## 2018-11-08 NOTE — TELEPHONE ENCOUNTER
Per dr danny lopes to proceed with surgery. Hold Xarelto 3 days prior and ASA 5 days prior    Letter faxed

## 2018-12-17 ENCOUNTER — OFFICE VISIT (OUTPATIENT)
Dept: CARDIOLOGY | Facility: CLINIC | Age: 68
End: 2018-12-17

## 2018-12-17 VITALS
HEART RATE: 69 BPM | DIASTOLIC BLOOD PRESSURE: 74 MMHG | WEIGHT: 174 LBS | BODY MASS INDEX: 28.99 KG/M2 | HEIGHT: 65 IN | SYSTOLIC BLOOD PRESSURE: 130 MMHG

## 2018-12-17 DIAGNOSIS — Z79.01 ANTICOAGULATED: ICD-10-CM

## 2018-12-17 DIAGNOSIS — I48.19 PERSISTENT ATRIAL FIBRILLATION (HCC): Primary | ICD-10-CM

## 2018-12-17 DIAGNOSIS — I10 ESSENTIAL HYPERTENSION: ICD-10-CM

## 2018-12-17 DIAGNOSIS — I25.10 CORONARY ARTERY DISEASE INVOLVING NATIVE CORONARY ARTERY OF NATIVE HEART WITHOUT ANGINA PECTORIS: ICD-10-CM

## 2018-12-17 DIAGNOSIS — R94.31 ABNORMAL EKG: ICD-10-CM

## 2018-12-17 PROCEDURE — 93000 ELECTROCARDIOGRAM COMPLETE: CPT | Performed by: INTERNAL MEDICINE

## 2018-12-17 PROCEDURE — 99213 OFFICE O/P EST LOW 20 MIN: CPT | Performed by: INTERNAL MEDICINE

## 2018-12-17 NOTE — PROGRESS NOTES
Subjective:        Tatyana Reyes is a 68 y.o. female who here for follow up    CC  Follow-up for the atrial fibrillation hypertension coronary artery disease  HPI  68-year-old the female with known history of the coronary artery disease, benign essential arterial hypertension, paroxysmal atrial fibrillation on anticoagulation here for the follow-up    Tatyana Reyes  here for follow up with no complaints of chest pain, sob, palpitation, syncope, near syncope  No side effects with current meds  No pnd, orthopnea       Problem List Items Addressed This Visit        Cardiovascular and Mediastinum    Persistent atrial fibrillation (CMS/HCC) - Primary    Relevant Orders    ECG 12 Lead    Essential hypertension    Coronary artery disease involving native heart without angina pectoris    Abnormal EKG       Other    Anticoagulated        .    The following portions of the patient's history were reviewed and updated as appropriate: allergies, current medications, past family history, past medical history, past social history, past surgical history and problem list.    Past Medical History:   Diagnosis Date   • Atrial fibrillation (CMS/HCC)    • Atrial fibrillation (CMS/HCC)    • CHF (congestive heart failure) (CMS/HCC)    • Chronic kidney disease    • Disease of thyroid gland    • DVT (deep venous thrombosis) (CMS/HCC) 09/08/2017    BILAT LE S/P SHOULDER REPLACEMENT   • Dyspnea    • Hypertension      reports that  has never smoked. she has never used smokeless tobacco. She reports that she does not drink alcohol or use drugs.   Family History   Problem Relation Age of Onset   • Heart disease Mother    • Stroke Mother    • Diabetes Brother        Review of Systems  Constitutional: No wt loss, fever, fatigue  Gastrointestinal: No nausea, abdominal pain  Behavioral/Psych: No insomnia or anxiety   Cardiovascular no chest pains or tightness in chest  Objective:                 Physical Exam  /74   Pulse 69   Ht 165.1  "cm (65\")   Wt 78.9 kg (174 lb)   BMI 28.96 kg/m²     General appearance: NAD, conversant   Eyes: anicteric sclerae, moist conjunctivae; no lid-lag; PERRLA   HENT: Atraumatic; oropharynx clear with moist mucous membranes and no mucosal ulcerations;  normal hard and soft palate   Neck: Trachea midline; FROM, supple, no thyromegaly or lymphadenopathy   Lungs: CTA, with normal respiratory effort and no intercostal retractions   CV: S1-S2 regular, no murmurs, no rub, no gallop   Abdomen: Soft, non-tender; no masses or HSM   Extremities: No peripheral edema or extremity lymphadenopathy  Skin: Normal temperature, turgor and texture; no rash, ulcers or subcutaneous nodules   Psych: Appropriate affect, alert and oriented to person, place and time             ECG 12 Lead  Date/Time: 12/17/2018 12:20 PM  Performed by: Abdi Ramesh MD  Authorized by: Abdi Ramesh MD   Comparison: compared with previous ECG   Similar to previous ECG  Rhythm: atrial fibrillation  Clinical impression: abnormal ECG              Echocardiogram:        Current Outpatient Medications:   •  amLODIPine (NORVASC) 10 MG tablet, Take 5 mg by mouth Daily. TAKE 1/2 TABLET DAILY, Disp: , Rfl:   •  aspirin 81 MG chewable tablet, Chew 81 mg Daily., Disp: , Rfl:   •  furosemide (LASIX) 20 MG tablet, Take 1 tablet by mouth Daily., Disp: 90 tablet, Rfl: 3  •  levothyroxine (SYNTHROID, LEVOTHROID) 50 MCG tablet, Take 50 mcg by mouth Daily., Disp: , Rfl:   •  metoprolol succinate XL (TOPROL-XL) 25 MG 24 hr tablet, Take 25 mg by mouth Daily., Disp: , Rfl:   •  Multiple Vitamins-Minerals (CENTRUM SILVER 50+WOMEN) tablet, Take 1 tablet by mouth Daily., Disp: , Rfl:   •  omeprazole (priLOSEC) 40 MG capsule, , Disp: , Rfl:   •  tiZANidine (ZANAFLEX) 4 MG tablet, , Disp: , Rfl:   •  valsartan (DIOVAN) 160 MG tablet, Take 160 mg by mouth Daily. 1.5 tablets daily, Disp: , Rfl:   •  XARELTO 20 MG tablet, 15 mg., Disp: , Rfl:    Assessment:    "     Patient Active Problem List   Diagnosis   • Persistent atrial fibrillation (CMS/HCC)   • Leg swelling   • Congestive heart failure (CMS/HCC)   • Essential hypertension   • Anticoagulated   • Abnormal cardiac function test   • Coronary artery disease involving native heart without angina pectoris   • Fatigue due to excessive exertion   • Abnormal EKG               Plan:            ICD-10-CM ICD-9-CM   1. Persistent atrial fibrillation (CMS/HCC) I48.1 427.31   2. Essential hypertension I10 401.9   3. Coronary artery disease involving native coronary artery of native heart without angina pectoris I25.10 414.01   4. Abnormal EKG R94.31 794.31   5. Anticoagulated Z79.01 V58.61     1. Persistent atrial fibrillation (CMS/HCC)  Heart rate controlled  - ECG 12 Lead    2. Essential hypertension  Blood pressure under control    3. Coronary artery disease involving native coronary artery of native heart without angina pectoris  No chest pains    4. Abnormal EKG  No chest pain    5. Anticoagulated  Pros and cons as well as indication of the anticoagulation has been explained to the patient in detail    There are no obvious complications at this stage    Risk of  the bleedings has been explained    Need for the regular blood workup and adjust the dose has been explained    Need for proper follow-up on anticoagulation also has been explained         cv stable    See in 6 months  COUNSELING:    Tatyana GoldtCounseling was given to patient for the following topics: diagnostic results, risk factor reductions, impressions, risks and benefits of treatment options and importance of treatment compliance .       SMOKING COUNSELING:    Counseling given: Not Answered      Dictated using Dragon dictation

## 2019-05-24 ENCOUNTER — TELEPHONE (OUTPATIENT)
Dept: CARDIOLOGY | Facility: CLINIC | Age: 69
End: 2019-05-24

## 2019-06-10 NOTE — PROGRESS NOTES
Subjective   Patient ID: Tatyana Reyes is a 69 y.o. female is here today as a self referral for back pain that radiates into bilateral lower extremity's R>L.    History of Present Illness    This patient has been having pain in her lower back with radiation into both of her legs but much worse on the right than the left.  This is been going on for over a year.  The pain is located in the lower back on both sides but worse in the right than the left.  It radiates into her right buttock and down her right groin her right thigh and the lower part of her leg.  It does not really go into her foot.  She has similar pain on the left side but just not as bad as the right.  She has had no specific treatment for this at this point.  She was offered epidural blocks about a year ago but her pain got better and she held off.  She is on Xarelto for A. fib.  The pain was pretty tolerable until about a month ago when it flared up again and is still present.    The following portions of the patient's history were reviewed and updated as appropriate: allergies, current medications, past family history, past medical history, past social history, past surgical history and problem list.    Review of Systems   Constitutional: Positive for activity change.   Respiratory: Negative for chest tightness and shortness of breath.    Cardiovascular: Negative for chest pain.   Musculoskeletal: Positive for arthralgias, back pain and myalgias.        Bilateral lower extremity pain R>L   Neurological: Positive for weakness.   Hematological: Bruises/bleeds easily.   All other systems reviewed and are negative.      Objective   Physical Exam   Constitutional: She is oriented to person, place, and time. She appears well-developed and well-nourished.   HENT:   Head: Normocephalic and atraumatic.   Eyes: Conjunctivae and EOM are normal. Pupils are equal, round, and reactive to light.   Fundoscopic exam:       The right eye shows no papilledema. The  right eye shows venous pulsations.        The left eye shows no papilledema. The left eye shows venous pulsations.   Neck: Carotid bruit is not present.   Neurological: She is oriented to person, place, and time. She has a normal Finger-Nose-Finger Test and a normal Heel to Shin Test. Gait normal.   Reflex Scores:       Tricep reflexes are 2+ on the right side and 2+ on the left side.       Bicep reflexes are 2+ on the right side and 2+ on the left side.       Brachioradialis reflexes are 2+ on the right side and 2+ on the left side.       Patellar reflexes are 2+ on the right side and 2+ on the left side.       Achilles reflexes are 2+ on the right side and 2+ on the left side.  Psychiatric: Her speech is normal.     Neurologic Exam     Mental Status   Oriented to person, place, and time.   Registration of memory: Good recent and remote memory.   Attention: normal. Concentration: normal.   Speech: speech is normal   Level of consciousness: alert  Knowledge: consistent with education.     Cranial Nerves     CN II   Visual fields full to confrontation.   Visual acuity: normal    CN III, IV, VI   Pupils are equal, round, and reactive to light.  Extraocular motions are normal.     CN V   Facial sensation intact.   Right corneal reflex: normal  Left corneal reflex: normal    CN VII   Facial expression full, symmetric.   Right facial weakness: none  Left facial weakness: none    CN VIII   Hearing: intact    CN IX, X   Palate: symmetric    CN XI   Right sternocleidomastoid strength: normal  Left sternocleidomastoid strength: normal    CN XII   Tongue: not atrophic  Tongue deviation: none    Motor Exam   Muscle bulk: normal  Right arm tone: normal  Left arm tone: normal  Right leg tone: normal  Left leg tone: normal    Strength   Strength 5/5 except as noted.     Sensory Exam   Light touch normal.     Gait, Coordination, and Reflexes     Gait  Gait: normal    Coordination   Finger to nose coordination: normal  Heel to shin  coordination: normal    Reflexes   Right brachioradialis: 2+  Left brachioradialis: 2+  Right biceps: 2+  Left biceps: 2+  Right triceps: 2+  Left triceps: 2+  Right patellar: 2+  Left patellar: 2+  Right achilles: 2+  Left achilles: 2+  Right : 2+  Left : 2+      Assessment/Plan   Independent Review of Radiographic Studies:      I reviewed her MRI of the lumbar spine done in June of last year.  This shows a grade 1 spondylolisthesis of L4 on L5.  On the axial images L1-2 is widely patent in the canal and neuroforamina as is L2-3.  L3-4 is also fairly open.  There is severe stenosis at L4-5 secondary to the spondylolisthesis and some midline disc bulging and a little bit of right-sided foraminal stenosis at L5-S1.    Medical Decision Making:      I told the patient about the imaging.  I told her that from my point of view I would tend to proceed with the epidural blocks.  I told her there was nothing here so serious that we absolutely have to do something and so she starts feeling better again she can stop doing anything to treat it.  On the other hand if her pain is that bad at this point I think epidural blocks carry a lower risk than surgery.  Surgery is certainly an option but I would tend to blocks before the surgery.  She has already received permission to come off of her Xarelto because she recently had an EGD.  I told her to call me if the blocks are not helping and we will get her back in the office to discuss further options.    Tatyana was seen today for back pain and leg pain.    Diagnoses and all orders for this visit:    Spondylolisthesis of lumbar region    Spinal stenosis of lumbar region with neurogenic claudication  -     Epidural Block      Return for Recheck and call after treatment or consultation.

## 2019-06-13 ENCOUNTER — OFFICE VISIT (OUTPATIENT)
Dept: NEUROSURGERY | Facility: CLINIC | Age: 69
End: 2019-06-13

## 2019-06-13 VITALS
DIASTOLIC BLOOD PRESSURE: 72 MMHG | BODY MASS INDEX: 28.99 KG/M2 | HEIGHT: 65 IN | SYSTOLIC BLOOD PRESSURE: 158 MMHG | WEIGHT: 174 LBS | HEART RATE: 78 BPM

## 2019-06-13 DIAGNOSIS — M43.16 SPONDYLOLISTHESIS OF LUMBAR REGION: Primary | ICD-10-CM

## 2019-06-13 DIAGNOSIS — M48.062 SPINAL STENOSIS OF LUMBAR REGION WITH NEUROGENIC CLAUDICATION: ICD-10-CM

## 2019-06-13 PROBLEM — M48.061 LUMBAR SPINAL STENOSIS: Status: ACTIVE | Noted: 2019-06-13

## 2019-06-13 PROCEDURE — 99203 OFFICE O/P NEW LOW 30 MIN: CPT | Performed by: NEUROLOGICAL SURGERY

## 2019-06-17 ENCOUNTER — OFFICE VISIT (OUTPATIENT)
Dept: CARDIOLOGY | Facility: CLINIC | Age: 69
End: 2019-06-17

## 2019-06-17 VITALS
SYSTOLIC BLOOD PRESSURE: 157 MMHG | HEART RATE: 82 BPM | WEIGHT: 172 LBS | HEIGHT: 65 IN | BODY MASS INDEX: 28.66 KG/M2 | DIASTOLIC BLOOD PRESSURE: 74 MMHG

## 2019-06-17 DIAGNOSIS — Z79.01 ANTICOAGULATED: ICD-10-CM

## 2019-06-17 DIAGNOSIS — I50.9 CONGESTIVE HEART FAILURE, UNSPECIFIED HF CHRONICITY, UNSPECIFIED HEART FAILURE TYPE (HCC): Primary | ICD-10-CM

## 2019-06-17 DIAGNOSIS — I25.10 CORONARY ARTERY DISEASE INVOLVING NATIVE CORONARY ARTERY OF NATIVE HEART WITHOUT ANGINA PECTORIS: ICD-10-CM

## 2019-06-17 DIAGNOSIS — I48.19 PERSISTENT ATRIAL FIBRILLATION (HCC): ICD-10-CM

## 2019-06-17 PROCEDURE — 99213 OFFICE O/P EST LOW 20 MIN: CPT | Performed by: INTERNAL MEDICINE

## 2019-06-17 NOTE — PROGRESS NOTES
Subjective:        Tatyana Reyes is a 69 y.o. female who here for follow up    CC  AFIB    BRUISE    OCCASIONAL CP  HPI  69-year-old female with persistent atrial fibrillation, congestive heart failure, coronary artery disease on chronic anticoagulation here for the follow-up has been complaining of the bruise, also has positive GI bleed being worked up for the same complaints of shortness of breath but no chest pain     Problem List Items Addressed This Visit        Cardiovascular and Mediastinum    Persistent atrial fibrillation (CMS/HCC)    Relevant Orders    Adult Transthoracic Echo Complete W/ Cont if Necessary Per Protocol    Congestive heart failure (CMS/HCC) - Primary    Relevant Orders    Adult Transthoracic Echo Complete W/ Cont if Necessary Per Protocol    Coronary artery disease involving native heart without angina pectoris    Relevant Orders    Adult Transthoracic Echo Complete W/ Cont if Necessary Per Protocol       Other    Anticoagulated        .    The following portions of the patient's history were reviewed and updated as appropriate: allergies, current medications, past family history, past medical history, past social history, past surgical history and problem list.    Past Medical History:   Diagnosis Date   • Atrial fibrillation (CMS/[facility])    • Atrial fibrillation (CMS/HCC)    • CHF (congestive heart failure) (CMS/[facility])    • Chronic kidney disease    • Disease of thyroid gland    • DVT (deep venous thrombosis) (CMS/[facility]) 09/08/2017    BILAT LE S/P SHOULDER REPLACEMENT   • Dyspnea    • Hypertension      reports that she has never smoked. She has never used smokeless tobacco. She reports that she does not drink alcohol or use drugs.   Family History   Problem Relation Age of Onset   • Heart disease Mother    • Stroke Mother    • Diabetes Brother        Review of Systems  Constitutional: No wt loss, fever, fatigue  Gastrointestinal: No nausea, abdominal pain  Behavioral/Psych: No insomnia or  "anxiety   Cardiovascular shortness of breath no chest pain  Objective:       Physical Exam  /74   Pulse 82   Ht 165.1 cm (65\")   Wt 78 kg (172 lb)   BMI 28.62 kg/m²   General appearance: No acute changes   Neck: Trachea midline; NECK, supple, no thyromegaly or lymphadenopathy   Lungs: Normal size and shape, normal breath sounds, equal distribution of air, no rales and rhonchi   CV: S1-S2 regular, no murmurs, no rub, no gallop   Abdomen: Soft, non-tender; no masses , no abnormal abdominal sounds   Extremities: No deformity , normal color , no peripheral edema   Skin: Normal temperature, turgor and texture; no rash, ulcers          Procedures      Echocardiogram:        Current Outpatient Medications:   •  amLODIPine (NORVASC) 10 MG tablet, Take 5 mg by mouth Daily. TAKE 1/2 TABLET DAILY, Disp: , Rfl:   •  aspirin 81 MG chewable tablet, Chew 81 mg Daily., Disp: , Rfl:   •  B Complex-Biotin-FA (VITAMIN B50 COMPLEX PO), Take  by mouth., Disp: , Rfl:   •  furosemide (LASIX) 20 MG tablet, Take 1 tablet by mouth Daily., Disp: 90 tablet, Rfl: 3  •  levothyroxine (SYNTHROID, LEVOTHROID) 50 MCG tablet, Take 50 mcg by mouth Daily., Disp: , Rfl:   •  metoprolol succinate XL (TOPROL-XL) 25 MG 24 hr tablet, Take 25 mg by mouth Daily., Disp: , Rfl:   •  Multiple Vitamins-Minerals (CENTRUM SILVER 50+WOMEN) tablet, Take 1 tablet by mouth Daily., Disp: , Rfl:   •  omeprazole (priLOSEC) 40 MG capsule, , Disp: , Rfl:   •  rivaroxaban (XARELTO) 15 MG tablet, Take 1 tablet by mouth Daily., Disp: 90 tablet, Rfl: 3  •  rosuvastatin (CRESTOR) 5 MG tablet, Take 5 mg by mouth Daily., Disp: , Rfl:   •  tiZANidine (ZANAFLEX) 4 MG tablet, , Disp: , Rfl:   •  valsartan (DIOVAN) 160 MG tablet, Take 160 mg by mouth Daily. 1.5 tablets daily, Disp: , Rfl:    Assessment:        Patient Active Problem List   Diagnosis   • Persistent atrial fibrillation (CMS/HCC)   • Leg swelling   • Congestive heart failure (CMS/HCC)   • Essential " hypertension   • Anticoagulated   • Abnormal cardiac function test   • Coronary artery disease involving native heart without angina pectoris   • Fatigue due to excessive exertion   • Abnormal EKG   • Spondylolisthesis of lumbar region   • Lumbar spinal stenosis               Plan:            ICD-10-CM ICD-9-CM   1. Congestive heart failure, unspecified HF chronicity, unspecified heart failure type (CMS/HCC) I50.9 428.0   2. Coronary artery disease involving native coronary artery of native heart without angina pectoris I25.10 414.01   3. Persistent atrial fibrillation (CMS/HCC) I48.1 427.31   4. Anticoagulated Z79.01 V58.61     1. Congestive heart failure, unspecified HF chronicity, unspecified heart failure type (CMS/HCC)  Considering patient's medical condition as well as the risk factors, patient will require echocardiogram for further evaluation for the LV function, four-chamber evaluation, including the pressures, valvular function and  pericardial disease and pericardial effusion    - Adult Transthoracic Echo Complete W/ Cont if Necessary Per Protocol; Future    2. Coronary artery disease involving native coronary artery of native heart without angina pectoris  No chest pain  - Adult Transthoracic Echo Complete W/ Cont if Necessary Per Protocol; Future    3. Persistent atrial fibrillation (CMS/HCC)  Controlled  - Adult Transthoracic Echo Complete W/ Cont if Necessary Per Protocol; Future    4. Anticoagulated  Discussion of the watchman was done at this point per patient's choice has been will be discontinued       STOP ASA, PER PT CHOICE    PROS/CONS EXPLAINED    SEE IN 6 MONTHS WITH ECHO    OCCASIONAL CP  COUNSELING:    Tatyana GoldtCounseling was given to patient for the following topics: diagnostic results, risk factor reductions, impressions, risks and benefits of treatment options and importance of treatment compliance .       SMOKING COUNSELING:    Counseling given: Not Answered      Dictated using  Jose dictation

## 2019-06-28 ENCOUNTER — TELEPHONE (OUTPATIENT)
Dept: CARDIOLOGY | Facility: CLINIC | Age: 69
End: 2019-06-28

## 2019-06-28 NOTE — TELEPHONE ENCOUNTER
PLEASE FAX LETTER (ONCE APPROVED) -[phone].  ONLY NEEDS TO HOLD FOR 3 DAYS.  PT IS AWARE THAT IT WILL BE Monday.   EPIDURAL IS SCHEDULED FOR July 12TH.

## 2019-06-28 NOTE — TELEPHONE ENCOUNTER
PATIENT HAVING EPIDURAL SHOTS ON 7/12/2019 IN HER BACK. DR LEDESMA  WANTS HER TO HOLD HER XARELTO 7 DAYS PRIOR . PLEASE ADVISE HOW MANY DAYS SHE MAY HOLD IT.    CALL [phone]

## 2019-07-12 ENCOUNTER — ANESTHESIA (OUTPATIENT)
Dept: PAIN MEDICINE | Facility: HOSPITAL | Age: 69
End: 2019-07-12

## 2019-07-12 ENCOUNTER — HOSPITAL ENCOUNTER (OUTPATIENT)
Dept: GENERAL RADIOLOGY | Facility: HOSPITAL | Age: 69
Discharge: HOME | End: 2019-07-12

## 2019-07-12 ENCOUNTER — HOSPITAL ENCOUNTER (OUTPATIENT)
Dept: PAIN MEDICINE | Facility: HOSPITAL | Age: 69
Discharge: HOME | End: 2019-07-12
Admitting: ANESTHESIOLOGY

## 2019-07-12 ENCOUNTER — ANESTHESIA EVENT (OUTPATIENT)
Dept: PAIN MEDICINE | Facility: HOSPITAL | Age: 69
End: 2019-07-12

## 2019-07-12 VITALS
TEMPERATURE: 97.9 F | OXYGEN SATURATION: 92 % | DIASTOLIC BLOOD PRESSURE: 89 MMHG | SYSTOLIC BLOOD PRESSURE: 189 MMHG | RESPIRATION RATE: 16 BRPM | HEART RATE: 87 BPM

## 2019-07-12 DIAGNOSIS — R52 PAIN: ICD-10-CM

## 2019-07-12 DIAGNOSIS — M48.062 SPINAL STENOSIS OF LUMBAR REGION WITH NEUROGENIC CLAUDICATION: Primary | ICD-10-CM

## 2019-07-12 PROCEDURE — 25010000002 MIDAZOLAM PER 1 MG: Performed by: ANESTHESIOLOGY

## 2019-07-12 PROCEDURE — 77003 FLUOROGUIDE FOR SPINE INJECT: CPT

## 2019-07-12 PROCEDURE — C1755 CATHETER, INTRASPINAL: HCPCS

## 2019-07-12 PROCEDURE — 25010000002 METHYLPREDNISOLONE PER 80 MG: Performed by: ANESTHESIOLOGY

## 2019-07-12 RX ORDER — MIDAZOLAM HYDROCHLORIDE 1 MG/ML
1 INJECTION INTRAMUSCULAR; INTRAVENOUS AS NEEDED
Status: DISCONTINUED | OUTPATIENT
Start: 2019-07-12 | End: 2019-07-13 | Stop reason: HOSPADM

## 2019-07-12 RX ORDER — SODIUM CHLORIDE 0.9 % (FLUSH) 0.9 %
1-10 SYRINGE (ML) INJECTION AS NEEDED
Status: DISCONTINUED | OUTPATIENT
Start: 2019-07-12 | End: 2019-07-13 | Stop reason: HOSPADM

## 2019-07-12 RX ORDER — LIDOCAINE HYDROCHLORIDE 10 MG/ML
1 INJECTION, SOLUTION INFILTRATION; PERINEURAL ONCE AS NEEDED
Status: DISCONTINUED | OUTPATIENT
Start: 2019-07-12 | End: 2019-07-13 | Stop reason: HOSPADM

## 2019-07-12 RX ORDER — FENTANYL CITRATE 50 UG/ML
50 INJECTION, SOLUTION INTRAMUSCULAR; INTRAVENOUS AS NEEDED
Status: DISCONTINUED | OUTPATIENT
Start: 2019-07-12 | End: 2019-07-13 | Stop reason: HOSPADM

## 2019-07-12 RX ORDER — METHYLPREDNISOLONE ACETATE 80 MG/ML
80 INJECTION, SUSPENSION INTRA-ARTICULAR; INTRALESIONAL; INTRAMUSCULAR; SOFT TISSUE ONCE
Status: COMPLETED | OUTPATIENT
Start: 2019-07-12 | End: 2019-07-12

## 2019-07-12 RX ADMIN — METHYLPREDNISOLONE ACETATE 80 MG: 80 INJECTION, SUSPENSION INTRA-ARTICULAR; INTRALESIONAL; INTRAMUSCULAR; SOFT TISSUE at 14:09

## 2019-07-12 RX ADMIN — MIDAZOLAM 1 MG: 1 INJECTION INTRAMUSCULAR; INTRAVENOUS at 14:01

## 2019-07-12 NOTE — ANESTHESIA PROCEDURE NOTES
PAIN Epidural block    Pre-sedation assessment completed: 7/12/2019 1:55 PM    Patient reassessed immediately prior to procedure    Patient location during procedure: pain clinic  Start Time: 7/12/2019 2:02 PM  Stop Time: 7/12/2019 2:10 PM  Indication:procedure for pain  Performed By  Anesthesiologist: Aaron Orosco MD  Preanesthetic Checklist  Completed: patient identified, site marked, surgical consent, pre-op evaluation, timeout performed, IV checked, risks and benefits discussed and monitors and equipment checked  Additional Notes  Performed under fluoroscopy  Post-Op Diagnosis Codes:     * Lumbar spinal stenosis (M48.061)     * Lumbar disc displacement without myelopathy (M51.26)  Prep:  Pt Position:prone  Sterile Tech:cap, gloves, mask and sterile barrier  Prep:chlorhexidine gluconate and isopropyl alcohol  Monitoring:blood pressure monitoring, continuous pulse oximetry and EKG  Procedure:Sedation: yes     Approach:right paramedian  Guidance: fluoroscopy  Location:lumbar (Intralaminar)  Level:4-5  Needle Type:Tuohy  Needle Gauge:20 G  Aspiration:negative  Test Dose:negative  Medications:  Depomedrol:80 mg  Preservative Free Saline:4mL  Isovue:0mL  Comments:No contrast due to renal issues  Post Assessment:  Post-procedure: Bandaid.  Pt Tolerance:patient tolerated the procedure well with no apparent complications  Complications:no

## 2019-07-12 NOTE — H&P
[facility]    History and Physical    Patient Name: Tatyana Reyes  :  1950  MRN:  [id number]  Date of Admission: 2019    Subjective     Patient is a 69 y.o. female presents with chief complaint of moderate, severe low back, hips: right and leg: bilateral pain.  Onset of symptoms was gradual starting 1 year ago.  Symptoms are associated/aggravated by lying down or sitting. Symptoms improve with nothing. Her MRI is reported as severe stenosis at L4-5 and right sided Foraminal stenosis at L5-S1.    The following portions of the patients history were reviewed and updated as appropriate: current medications, allergies, past medical history, past surgical history, past family history, past social history and problem list                Objective     Past Medical History:   Past Medical History:   Diagnosis Date   • Atrial fibrillation (CMS/[facility])    • Atrial fibrillation (CMS/[facility])    • CHF (congestive heart failure) (CMS/[facility])    • Chronic kidney disease    • Disease of thyroid gland    • DVT (deep venous thrombosis) (CMS/[facility]) 2017    BILAT LE S/P SHOULDER REPLACEMENT   • Dyspnea    • Hypertension    • Low back pain      Past Surgical History:   Past Surgical History:   Procedure Laterality Date   • BLADDER REPAIR     • CARDIAC CATHETERIZATION N/A 2018    Procedure: Left Heart Cath;  Surgeon: Abdi Ramesh MD;  Location: [facility] INVASIVE LOCATION;  Service:    • CARDIAC CATHETERIZATION N/A 2018    Procedure: Coronary angiography;  Surgeon: Abdi Ramesh MD;  Location: [facility] INVASIVE LOCATION;  Service:    • CARDIAC CATHETERIZATION N/A 2018    Procedure: Left ventriculography;  Surgeon: Abdi Ramesh MD;  Location: [facility] INVASIVE LOCATION;  Service:    • CARDIAC CATHETERIZATION N/A 2018    Procedure: Stent BMS coronary;  Surgeon: Abdi Ramesh MD;  Location: [facility] INVASIVE LOCATION;  Service:    • EPIDURAL BLOCK     • PARATHYROID  GLAND SURGERY     • [place] RT/LT HEART CATHETERS N/A 1/5/2018    Procedure: Percutaneous Coronary Intervention;  Surgeon: Abdi Ramesh MD;  Location: [facility] INVASIVE LOCATION;  Service: Cardiovascular   • TONSILLECTOMY     • TOTAL SHOULDER REPLACEMENT Left 09/08/2017   • TUBAL ABDOMINAL LIGATION       Family History:   Family History   Problem Relation Age of Onset   • Heart disease Mother    • Stroke Mother    • Diabetes Brother      Social History:   Social History     Tobacco Use   • Smoking status: Never Smoker   • Smokeless tobacco: Never Used   Substance Use Topics   • Alcohol use: No   • Drug use: No       Vital Signs Range for the last 24 hours  Temperature: Temp:  [36.6 °C (97.9 °F)] 36.6 °C (97.9 °F)   Temp Source: Temp src: Oral   BP: BP: (195)/(103) 195/103   Pulse: Heart Rate:  [90] 90   Respirations: Resp:  [16] 16   SPO2: SpO2:  [100 %] 100 %   O2 Amount (l/min):     O2 Devices Device (Oxygen Therapy): room air   Weight:           --------------------------------------------------------------------------------    Current Outpatient Medications   Medication Sig Dispense Refill   • amLODIPine (NORVASC) 10 MG tablet Take 5 mg by mouth Daily. TAKE 1/2 TABLET DAILY     • B Complex-Biotin-FA (VITAMIN B50 COMPLEX PO) Take  by mouth.     • furosemide (LASIX) 20 MG tablet Take 1 tablet by mouth Daily. 90 tablet 3   • levothyroxine (SYNTHROID, LEVOTHROID) 50 MCG tablet Take 50 mcg by mouth Daily.     • metoprolol succinate XL (TOPROL-XL) 25 MG 24 hr tablet Take 25 mg by mouth Daily.     • Multiple Vitamins-Minerals (CENTRUM SILVER 50+WOMEN) tablet Take 1 tablet by mouth Daily.     • omeprazole (priLOSEC) 40 MG capsule      • rosuvastatin (CRESTOR) 5 MG tablet Take 5 mg by mouth Daily.     • valsartan (DIOVAN) 160 MG tablet Take 160 mg by mouth Daily. 1.5 tablets daily     • rivaroxaban (XARELTO) 15 MG tablet Take 1 tablet by mouth Daily. 90 tablet 3   • tiZANidine (ZANAFLEX) 4 MG tablet        No  current facility-administered medications for this encounter.        --------------------------------------------------------------------------------  Assessment/Plan      Anesthesia Evaluation           Pain impairs ability to perform ADLs: Dressing, Housekeeping, Driving, Sleeping and Exercise/Activity       Airway   Mallampati: II  Dental - normal exam     Pulmonary - normal exam   Cardiovascular - normal exam        Neuro/Psych  (+)   Strength normal in all four extremities,     normal reflexes and symmetric  (-) left straight leg raise test, right straight leg raise test  GI/Hepatic/Renal/Endo      Musculoskeletal     Abdominal    Substance History      OB/GYN          Other                   Diagnosis and Plan    Treatment Plan  ASA 3      Procedures: With fluoroscopy,       Anesthetic plan and risks discussed with patient (Daughter).          Diagnosis     * Lumbar spinal stenosis [M48.061]     * Lumbar disc displacement without myelopathy [M51.26]

## 2019-10-29 ENCOUNTER — ANESTHESIA (OUTPATIENT)
Dept: PAIN MEDICINE | Facility: HOSPITAL | Age: 69
End: 2019-10-29

## 2019-10-29 ENCOUNTER — HOSPITAL ENCOUNTER (OUTPATIENT)
Dept: GENERAL RADIOLOGY | Facility: HOSPITAL | Age: 69
Discharge: HOME | End: 2019-10-29

## 2019-10-29 ENCOUNTER — HOSPITAL ENCOUNTER (OUTPATIENT)
Dept: PAIN MEDICINE | Facility: HOSPITAL | Age: 69
Discharge: HOME | End: 2019-10-29
Admitting: ANESTHESIOLOGY

## 2019-10-29 ENCOUNTER — ANESTHESIA EVENT (OUTPATIENT)
Dept: PAIN MEDICINE | Facility: HOSPITAL | Age: 69
End: 2019-10-29

## 2019-10-29 VITALS
HEART RATE: 83 BPM | SYSTOLIC BLOOD PRESSURE: 184 MMHG | DIASTOLIC BLOOD PRESSURE: 100 MMHG | TEMPERATURE: 98.2 F | OXYGEN SATURATION: 100 % | RESPIRATION RATE: 16 BRPM

## 2019-10-29 DIAGNOSIS — M48.062 SPINAL STENOSIS OF LUMBAR REGION WITH NEUROGENIC CLAUDICATION: Primary | ICD-10-CM

## 2019-10-29 DIAGNOSIS — R52 PAIN: ICD-10-CM

## 2019-10-29 PROCEDURE — 77003 FLUOROGUIDE FOR SPINE INJECT: CPT

## 2019-10-29 PROCEDURE — 25010000002 METHYLPREDNISOLONE PER 80 MG: Performed by: ANESTHESIOLOGY

## 2019-10-29 PROCEDURE — C1755 CATHETER, INTRASPINAL: HCPCS

## 2019-10-29 RX ORDER — MIDAZOLAM HYDROCHLORIDE 1 MG/ML
1 INJECTION INTRAMUSCULAR; INTRAVENOUS AS NEEDED
Status: DISCONTINUED | OUTPATIENT
Start: 2019-10-29 | End: 2019-10-30 | Stop reason: HOSPADM

## 2019-10-29 RX ORDER — METHYLPREDNISOLONE ACETATE 80 MG/ML
80 INJECTION, SUSPENSION INTRA-ARTICULAR; INTRALESIONAL; INTRAMUSCULAR; SOFT TISSUE ONCE
Status: COMPLETED | OUTPATIENT
Start: 2019-10-29 | End: 2019-10-29

## 2019-10-29 RX ORDER — SODIUM CHLORIDE 0.9 % (FLUSH) 0.9 %
1-10 SYRINGE (ML) INJECTION AS NEEDED
Status: DISCONTINUED | OUTPATIENT
Start: 2019-10-29 | End: 2019-10-30 | Stop reason: HOSPADM

## 2019-10-29 RX ORDER — FENTANYL CITRATE 50 UG/ML
50 INJECTION, SOLUTION INTRAMUSCULAR; INTRAVENOUS AS NEEDED
Status: DISCONTINUED | OUTPATIENT
Start: 2019-10-29 | End: 2019-10-30 | Stop reason: HOSPADM

## 2019-10-29 RX ORDER — LIDOCAINE HYDROCHLORIDE 10 MG/ML
1 INJECTION, SOLUTION INFILTRATION; PERINEURAL ONCE AS NEEDED
Status: DISCONTINUED | OUTPATIENT
Start: 2019-10-29 | End: 2019-10-30 | Stop reason: HOSPADM

## 2019-10-29 RX ORDER — TRAMADOL HYDROCHLORIDE 50 MG/1
50 TABLET ORAL
COMMUNITY
Start: 2019-08-30 | End: 2022-01-17

## 2019-10-29 RX ADMIN — METHYLPREDNISOLONE ACETATE 80 MG: 80 INJECTION, SUSPENSION INTRA-ARTICULAR; INTRALESIONAL; INTRAMUSCULAR; SOFT TISSUE at 10:38

## 2019-10-29 NOTE — H&P
[facility]    History and Physical    Patient Name: Tatyana Reyes  :  1950  MRN:  [id number]  Date of Admission: 10/29/2019    Subjective     Patient is a 69 y.o. female presents with chief complaint of chronic low back pain.  Onset of symptoms was gradual starting several months ago.  Symptoms are associated/aggravated by nothing in particular. Symptoms improve with injection    The following portions of the patients history were reviewed and updated as appropriate: current medications, allergies, past medical history, past surgical history, past family history, past social history and problem list      She has a long history of low intermittent back pain.  Is recently become exacerbated such that she had an epidural steroid injection done in July of this year.  She had about 85% of relief of her pain for the better part of 2 to 3 months.  She did significantly better but the pain is starting to come back.  She has an MRI which shows displaced disc at L4-5.  Scribes pain that is in her low back that radiates into both of her legs with the right being worse than the left.  It somewhat in the anterior lateral aspect of her right leg and into the groin a bit to.  Her radicular symptoms seem to be more in the L2-3 range but her MRI is slightly more caudal.  She did quite well with an L4-5 epidural steroid injection prior to this        Objective     Past Medical History:   Past Medical History:   Diagnosis Date   • Atrial fibrillation (CMS/HCC)    • Atrial fibrillation (CMS/HCC)    • CHF (congestive heart failure) (CMS/HCC)    • Chronic kidney disease    • Disease of thyroid gland    • DVT (deep venous thrombosis) (CMS/HCC) 2017    BILAT LE S/P SHOULDER REPLACEMENT   • Dyspnea    • Hypertension    • Low back pain      Past Surgical History:   Past Surgical History:   Procedure Laterality Date   • BLADDER REPAIR     • CARDIAC CATHETERIZATION N/A 2018    Procedure: Left Heart Cath;  Surgeon:  Abdi Ramesh MD;  Location: [facility] CATH INVASIVE LOCATION;  Service:    • CARDIAC CATHETERIZATION N/A 1/5/2018    Procedure: Coronary angiography;  Surgeon: Abdi Ramesh MD;  Location: [facility] CATH INVASIVE LOCATION;  Service:    • CARDIAC CATHETERIZATION N/A 1/5/2018    Procedure: Left ventriculography;  Surgeon: Abdi Ramesh MD;  Location: [facility] CATH INVASIVE LOCATION;  Service:    • CARDIAC CATHETERIZATION N/A 1/5/2018    Procedure: Stent BMS coronary;  Surgeon: Abdi Ramesh MD;  Location: [facility] CATH INVASIVE LOCATION;  Service:    • EPIDURAL BLOCK     • PARATHYROID GLAND SURGERY     • PA RT/LT HEART CATHETERS N/A 1/5/2018    Procedure: Percutaneous Coronary Intervention;  Surgeon: Abdi Ramesh MD;  Location: [facility] CATH INVASIVE LOCATION;  Service: Cardiovascular   • TONSILLECTOMY     • TOTAL SHOULDER REPLACEMENT Left 09/08/2017   • TUBAL ABDOMINAL LIGATION       Family History:   Family History   Problem Relation Age of Onset   • Heart disease Mother    • Stroke Mother    • Diabetes Brother      Social History:   Social History     Tobacco Use   • Smoking status: Never Smoker   • Smokeless tobacco: Never Used   Substance Use Topics   • Alcohol use: No   • Drug use: No       Vital Signs Range for the last 24 hours  Temperature: Temp:  [36.8 °C (98.2 °F)] 36.8 °C (98.2 °F)   Temp Source: Temp src: Oral   BP: BP: (194)/(88) 194/88   Pulse: Heart Rate:  [82] 82   Respirations: Resp:  [16] 16   SPO2: SpO2:  [99 %] 99 %   O2 Amount (l/min):     O2 Devices Device (Oxygen Therapy): room air   Weight:           --------------------------------------------------------------------------------    Current Outpatient Medications   Medication Sig Dispense Refill   • amLODIPine (NORVASC) 10 MG tablet Take 5 mg by mouth Daily. TAKE 1/2 TABLET DAILY     • B Complex-Biotin-FA (VITAMIN B50 COMPLEX PO) Take  by mouth.     • furosemide (LASIX) 20 MG tablet Take 1 tablet by mouth Daily.  90 tablet 3   • levothyroxine (SYNTHROID, LEVOTHROID) 50 MCG tablet Take 50 mcg by mouth Daily.     • metoprolol succinate XL (TOPROL-XL) 25 MG 24 hr tablet Take 25 mg by mouth Daily.     • Multiple Vitamins-Minerals (CENTRUM SILVER 50+WOMEN) tablet Take 1 tablet by mouth Daily.     • omeprazole (priLOSEC) 40 MG capsule      • rosuvastatin (CRESTOR) 5 MG tablet Take 5 mg by mouth Daily.     • tiZANidine (ZANAFLEX) 4 MG tablet      • valsartan (DIOVAN) 160 MG tablet Take 160 mg by mouth Daily. 1.5 tablets daily     • rivaroxaban (XARELTO) 15 MG tablet Take 1 tablet by mouth Daily. 90 tablet 3   • traMADol (ULTRAM) 50 MG tablet 50 mg.       Current Facility-Administered Medications   Medication Dose Route Frequency Provider Last Rate Last Dose   • fentaNYL citrate (PF) (SUBLIMAZE) injection 50 mcg  50 mcg Intravenous PRN RenderJeffrey MD       • iopamidol (ISOVUE-M 200) injection 41%  12 mL Epidural Once in imaging Render, Jeffrey Ramos MD       • lidocaine (XYLOCAINE) 1 % injection 1 mL  1 mL Intradermal Once PRN Jeffrey Thomason MD       • methylPREDNISolone acetate (DEPO-medrol) injection 80 mg  80 mg Intra-articular Once Render, Jeffrey Ramos MD       • midazolam (VERSED) injection 1 mg  1 mg Intravenous PRN Render, Jeffrey Ramos MD       • sodium chloride 0.9 % flush 1-10 mL  1-10 mL Intravenous PRN Jeffrey Thomason MD           --------------------------------------------------------------------------------  Assessment/Plan      Anesthesia Evaluation     NPO Solid Status: > 8 hours      Pain impairs ability to perform ADLs: Ambulation and Exercise/Activity       Airway   Mallampati: II  TM distance: >3 FB  Neck ROM: full  No difficulty expected  Dental - normal exam     Pulmonary - normal exam   (+) shortness of breath,   Cardiovascular     ECG reviewed  Rhythm: irregular    (+) hypertension 2 medications or greater, CAD, dysrhythmias Atrial Fib, CHF , DVT,   (-) murmur, carotid bruits      Neuro/Psych  (+)    left straight leg raise test,   right straight leg raise test,      PE Comment: Was unable to elicit a left patellar tendon reflex.  Right patellar tendon reflex was intact.  GI/Hepatic/Renal/Endo    (+)   renal disease CRI,     Musculoskeletal     Abdominal    Substance History      OB/GYN          Other                   Diagnosis and Plan    Treatment Plan  ASA 3   Patient has had previous injection/procedure with % improvement.   Procedures: Lumbar Epidural Steroid Injection(LESI), With fluoroscopy,           Lumbar epidural steroid injection probably at the L4-5 level she did very well with that prior to this.  Appraised the risks and benefits including but not limited to bleeding, infection, dural puncture headache and inadvertent or allergic reaction to local anesthetic or steroid    Diagnosis     * Lumbago [M54.5]     * Lumbar neuritis [M54.16]     * Displacement of lumbar disc with radiculopathy [M51.16]

## 2019-12-10 ENCOUNTER — OFFICE VISIT (OUTPATIENT)
Dept: CARDIOLOGY | Facility: CLINIC | Age: 69
End: 2019-12-10

## 2019-12-10 VITALS
WEIGHT: 172 LBS | SYSTOLIC BLOOD PRESSURE: 181 MMHG | DIASTOLIC BLOOD PRESSURE: 83 MMHG | HEIGHT: 65 IN | HEART RATE: 76 BPM | BODY MASS INDEX: 28.66 KG/M2

## 2019-12-10 DIAGNOSIS — I48.19 PERSISTENT ATRIAL FIBRILLATION (HCC): Primary | ICD-10-CM

## 2019-12-10 DIAGNOSIS — E78.5 HYPERLIPIDEMIA, UNSPECIFIED HYPERLIPIDEMIA TYPE: ICD-10-CM

## 2019-12-10 DIAGNOSIS — I10 ESSENTIAL HYPERTENSION: ICD-10-CM

## 2019-12-10 DIAGNOSIS — M79.89 LEG SWELLING: ICD-10-CM

## 2019-12-10 PROCEDURE — 99213 OFFICE O/P EST LOW 20 MIN: CPT | Performed by: NURSE PRACTITIONER

## 2019-12-10 RX ORDER — BISACODYL 5 MG/1
1 TABLET, DELAYED RELEASE ORAL DAILY
COMMUNITY

## 2019-12-10 RX ORDER — WARFARIN SODIUM 5 MG/1
5 TABLET ORAL
COMMUNITY
End: 2020-06-30

## 2019-12-10 RX ORDER — METOPROLOL SUCCINATE 50 MG/1
TABLET, EXTENDED RELEASE ORAL
Qty: 30 TABLET | Refills: 3 | Status: SHIPPED | OUTPATIENT
Start: 2019-12-10 | End: 2020-03-02

## 2019-12-10 RX ORDER — PNV NO.95/FERROUS FUM/FOLIC AC 28MG-0.8MG
1 TABLET ORAL DAILY
COMMUNITY

## 2019-12-10 RX ORDER — ASPIRIN 81 MG/1
81 TABLET, CHEWABLE ORAL DAILY
COMMUNITY
Start: 2019-11-15

## 2019-12-10 NOTE — PROGRESS NOTES
Patient Name: Tatyana Reyes  :1950  Age: 69 y.o.  Primary Cardiologist: Abdi Ramesh MD  Encounter Provider:  TERESA Szymanski      Chief Complaint:   Chief Complaint   Patient presents with   • Hypertension         HPI this 69-year-old female, new to this provider, comes today in follow-up with complaints of recent leg swelling on increased dose of amlodipine.  Blood pressure is elevated again in office today, however patient states that this normally is much better controlled at other offices.  She does not keep track of this at home.  Of note, she recently underwent the watchman procedure.  She is scheduled for follow-up [facility] January 3, and is currently still on Coumadin.  Rivaroxaban was discontinued prior to this procedure per their protocol at [place].  Last echo  revealed mild elation of LAC, mild MR/TR, EF 57.9% with no evidence of pericardial effusion.  Stress testing 2017 revealed a small sized mildly severe area of ischemia in the apex and inferior wall.  This led to cardiac catheterization on 2018 which revealed normal left main, LAD with 80% stenosis reduced to 0 status post RAOUL placement, circumflex codominant normal, RCA codominant and normal.  No recent lipid panel available for review at this time.  Patient currently complains of intermittent palpitations related to her atrial fibrillation, this is normal per her baseline.  Of note, she does have some mild intermittent chest pain as well which is not accompanied by shortness of breath.    Patient Active Problem List   Diagnosis   • Persistent atrial fibrillation   • Leg swelling   • Congestive heart failure (CMS/HCC)   • Essential hypertension   • Anticoagulated   • Abnormal cardiac function test   • Coronary artery disease involving native heart without angina pectoris   • Fatigue due to excessive exertion   • Abnormal EKG   • Spondylolisthesis of lumbar region   • Lumbar spinal  stenosis   • Hyperlipidemia           The following portions of the patient's history were reviewed and updated as appropriate: allergies, current medications, past family history, past medical history, past social history, past surgical history and problem list.    Current Outpatient Medications on File Prior to Visit   Medication Sig Dispense Refill   • amLODIPine (NORVASC) 10 MG tablet Take 5 mg by mouth Daily. TAKE 1/2 TABLET DAILY     • aspirin 81 MG chewable tablet Chew 81 mg Daily.     • B Complex-Biotin-FA (VITAMIN B50 COMPLEX PO) Take  by mouth.     • bisacodyl (DULCOLAX) 5 MG EC tablet Take 1 tablet by mouth Daily.     • Ferrous Sulfate (IRON) 325 (65 Fe) MG tablet Take 1 tablet by mouth Daily.     • furosemide (LASIX) 20 MG tablet Take 1 tablet by mouth Daily. 90 tablet 3   • levothyroxine (SYNTHROID, LEVOTHROID) 50 MCG tablet Take 50 mcg by mouth Daily.     • Multiple Vitamins-Minerals (CENTRUM SILVER 50+WOMEN) tablet Take 1 tablet by mouth Daily.     • omeprazole (priLOSEC) 40 MG capsule      • rivaroxaban (XARELTO) 15 MG tablet Take 1 tablet by mouth Daily. 90 tablet 3   • rosuvastatin (CRESTOR) 5 MG tablet Take 5 mg by mouth Daily.     • tiZANidine (ZANAFLEX) 4 MG tablet      • traMADol (ULTRAM) 50 MG tablet 50 mg.     • valsartan (DIOVAN) 160 MG tablet Take 160 mg by mouth Daily. 1.5 tablets daily     • warfarin (COUMADIN) 5 MG tablet Take 5 mg by mouth Daily.     • [DISCONTINUED] metoprolol succinate XL (TOPROL-XL) 25 MG 24 hr tablet Take 25 mg by mouth Daily.       No current facility-administered medications on file prior to visit.        Past Medical History:   Diagnosis Date   • Atrial fibrillation (CMS/HCC)    • Atrial fibrillation (CMS/HCC)    • CHF (congestive heart failure) (CMS/HCC)    • Chronic kidney disease    • Disease of thyroid gland    • DVT (deep venous thrombosis) (CMS/HCC) 09/08/2017    BILAT LE S/P SHOULDER REPLACEMENT   • Dyspnea    • Hypertension    • Low back pain        Past  Surgical History:   Procedure Laterality Date   • BLADDER REPAIR     • CARDIAC CATHETERIZATION N/A 1/5/2018    Procedure: Left Heart Cath;  Surgeon: Abdi Ramesh MD;  Location:  [facility] CATH INVASIVE LOCATION;  Service:    • CARDIAC CATHETERIZATION N/A 1/5/2018    Procedure: Coronary angiography;  Surgeon: Abdi Ramesh MD;  Location:  [facility] CATH INVASIVE LOCATION;  Service:    • CARDIAC CATHETERIZATION N/A 1/5/2018    Procedure: Left ventriculography;  Surgeon: Abdi Ramesh MD;  Location:  [facility] CATH INVASIVE LOCATION;  Service:    • CARDIAC CATHETERIZATION N/A 1/5/2018    Procedure: Stent BMS coronary;  Surgeon: Abdi Ramesh MD;  Location:  [facility] CATH INVASIVE LOCATION;  Service:    • EPIDURAL BLOCK     • PARATHYROID GLAND SURGERY     • IN RT/LT HEART CATHETERS N/A 1/5/2018    Procedure: Percutaneous Coronary Intervention;  Surgeon: Abdi Ramesh MD;  Location:  [facility] CATH INVASIVE LOCATION;  Service: Cardiovascular   • TONSILLECTOMY     • TOTAL SHOULDER REPLACEMENT Left 09/08/2017   • TUBAL ABDOMINAL LIGATION         Family History   Problem Relation Age of Onset   • Heart disease Mother    • Stroke Mother    • Diabetes Brother        Social History     Socioeconomic History   • Marital status:      Spouse name: Not on file   • Number of children: 1   • Years of education: 12   • Highest education level: High school graduate   Occupational History   • Occupation: RETIRED   Social Needs   • Financial resource strain: Patient refused   • Food insecurity:     Worry: Patient refused     Inability: Patient refused   • Transportation needs:     Medical: Patient refused     Non-medical: Patient refused   Tobacco Use   • Smoking status: Never Smoker   • Smokeless tobacco: Never Used   Substance and Sexual Activity   • Alcohol use: No   • Drug use: No   • Sexual activity: Defer   Social History Narrative    LIVES WITH SPOUSE       Review of Systems   Constitution: Negative  "for diaphoresis and malaise/fatigue.   HENT: Negative for nosebleeds and stridor.    Cardiovascular: Positive for chest pain and palpitations. Negative for claudication, dyspnea on exertion, irregular heartbeat, leg swelling, near-syncope, orthopnea, paroxysmal nocturnal dyspnea and syncope.        Mild, intermittent, primarily noted at rest   Respiratory: Negative for cough, hemoptysis, shortness of breath and wheezing.    Gastrointestinal: Negative for abdominal pain, constipation, diarrhea, hematemesis, hematochezia, melena, nausea and vomiting.   Neurological: Negative for dizziness, focal weakness, light-headedness and weakness.       OBJECTIVE:   Vital Signs  Vitals:    12/10/19 1444   BP: (!) 181/83   Pulse: 76     Estimated body mass index is 28.62 kg/m² as calculated from the following:    Height as of this encounter: 165.1 cm (65\").    Weight as of this encounter: 78 kg (172 lb).    Physical Exam   Constitutional: She is oriented to person, place, and time. She appears well-developed and well-nourished. No distress.   HENT:   Head: Normocephalic and atraumatic.   Eyes: Pupils are equal, round, and reactive to light. Conjunctivae and EOM are normal.   Neck: Normal range of motion. Neck supple. No JVD present. No tracheal deviation present. No thyromegaly present.   Cardiovascular: Normal rate, regular rhythm and intact distal pulses. Exam reveals no gallop and no friction rub.   Murmur heard.  Systolic LSB   Pulmonary/Chest: Effort normal and breath sounds normal. No respiratory distress. She has no wheezes. She has no rales. She exhibits no tenderness.   Abdominal: Soft. Bowel sounds are normal. She exhibits no distension. There is no tenderness.   Musculoskeletal: Normal range of motion. She exhibits no edema, tenderness or deformity.   Neurological: She is alert and oriented to person, place, and time.   Skin: Skin is warm and dry. No rash noted. She is not diaphoretic. No erythema. No pallor. "   Psychiatric: She has a normal mood and affect. Her behavior is normal.       Procedures    Cardiac Cath:  18  HEMODYNAMIC / ANGIOGRAPHIC DATA:    1. Left ventricular end diastolic pressure was 10 mmHg.  2. Left ventriculography revealed an EF around 50%.    3. The left main is normal.  4. The left anterior descending artery is proximal 80% reduced to 0% with 3.0/15 dilated to 3.2  Vision stent.  5. The left circumflex is codominant and normal.  6. The right coronary artery is codominant and normal.  7. Successful stenting of the proximal LAD, with reduction of stenosis from 80% to 0% using 3.0/15 dilated to 3.2.     KEENA FLOW    PRE... 3....       POST.  3.....     TYPE OF LESION...... B  1.......     RECOMMENDATIONS:  Post-procedure care will focus on prevention of any ischemic events and congestive complications.  Aggressive risk factor modification will be carried out.  Importance of taking dual antiplatelets for one year  has been explained, risk of stent thrombosis leading to the acute MI, which carries high morbidity and mortality has been explained     Discontinuation or interruptions of these medications should be under the strict guidance of appropriate health professional     Vision stent was placed as the patient needed shoulder surgery    Stress Testin17  Interpretation Summary        · Findings consistent with a normal ECG stress test.  · Left ventricular ejection fraction is normal (Calculated EF = 60%).  · Myocardial perfusion imaging indicates a small-sized, mildly severe area of ischemia located in the apex and inferior wall.  · Impressions are consistent with an intermediate risk study.     Asymptomatic for chest pain. ECG is negative for ischemia.   Ectopy: baseline atrial fib  B/P is appropriate.  Pharmacologic study due to inability to tolerate walking on treadmill.         Cardiac Echo:  10/31/17  Interpretation Summary     · Left atrial cavity size is mildly dilated.  · Mild  mitral valve regurgitation is present  · Mild tricuspid valve regurgitation is present.  · Left Ventricle: Calculated EF = 57.9%.  · There is no evidence of pericardial effusion           ASSESSMENT:      Diagnosis Plan   1. Persistent atrial fibrillation     2. Essential hypertension     3. Leg swelling     4. Hyperlipidemia, unspecified hyperlipidemia type           PLAN OF CARE:     1.  Hypertension-blood pressure in office today is elevated, as it has been at prior visits noted within the epic system.  The patient states that at other office visits with her PCP her blood pressure is much better controlled with systolic 1 20-1 30.  She does not keep track of this at home.  Of note, her amlodipine was recently increased to 10 mg which she cut back to 5 mg herself as it again caused swelling in her legs.  Her edema is now resolved after reduction of her amlodipine dose.  We will increase her metoprolol to 50 mg daily, I asked that the patient get a blood pressure cuff and keep a log of her blood pressure and follow-up within 2 weeks regarding these numbers.  Will check TTE that has previously been ordered.  CHINTAN is scheduled for follow-up for watchman procedure at [facility].    Importance of controlling hypertension and blood pressure  checkup on the regular basis has been explained  Hypertension as a silent killer has been discussed  Risk reduction of the weight and regular exercises to control the hypertension has been explained      2.  Persistent atrial fibrillation-patient recently underwent watchman procedure with [facility].  She is scheduled for follow-up [facility] outpatient January 3.  She is currently still anticoagulated on warfarin per their protocol, rivaroxaban was discontinued prior to this procedure.  Rate currently controlled on metoprolol, continue.    3.  Hyperlipidemia- no recent lab work available for review at this time.  Will obtain FLP and CMP after discussion at next office visit.    Risk of the  hyperlipidemia, importance of the treatment has been explained  Pros and cons of the statins has been explained  Regular blood workup as well as side effects including the liver failure, myelopathy death has been explained     4.  Chest pain-mild and intermittent.  The patient would like to defer stress testing until after she has her CHINTAN done in January.  I asked that she please go to the emergency department should she experience any chest pain that is persistent, worse than normal, or accompanied by shortness of breath.        Complete echo, increase metoprolol, follow-up in 2 weeks or sooner if needed.      Sincerely,   TERESA Szymanski  [facility]  12/10/19  4:19 PM

## 2019-12-16 ENCOUNTER — HOSPITAL ENCOUNTER (OUTPATIENT)
Dept: CARDIOLOGY | Facility: HOSPITAL | Age: 69
Discharge: HOME | End: 2019-12-16
Admitting: INTERNAL MEDICINE

## 2019-12-16 VITALS
HEIGHT: 65 IN | DIASTOLIC BLOOD PRESSURE: 94 MMHG | BODY MASS INDEX: 28.66 KG/M2 | SYSTOLIC BLOOD PRESSURE: 192 MMHG | WEIGHT: 172 LBS | HEART RATE: 79 BPM

## 2019-12-16 DIAGNOSIS — I50.9 CONGESTIVE HEART FAILURE, UNSPECIFIED HF CHRONICITY, UNSPECIFIED HEART FAILURE TYPE (HCC): ICD-10-CM

## 2019-12-16 DIAGNOSIS — I25.10 CORONARY ARTERY DISEASE INVOLVING NATIVE CORONARY ARTERY OF NATIVE HEART WITHOUT ANGINA PECTORIS: ICD-10-CM

## 2019-12-16 DIAGNOSIS — I48.19 PERSISTENT ATRIAL FIBRILLATION (HCC): ICD-10-CM

## 2019-12-16 PROCEDURE — 93306 TTE W/DOPPLER COMPLETE: CPT

## 2019-12-16 PROCEDURE — 93306 TTE W/DOPPLER COMPLETE: CPT | Performed by: INTERNAL MEDICINE

## 2019-12-17 LAB
BH CV ECHO MEAS - ACS: 1.6 CM
BH CV ECHO MEAS - AO MAX PG (FULL): 7.4 MMHG
BH CV ECHO MEAS - AO MAX PG: 11.3 MMHG
BH CV ECHO MEAS - AO MEAN PG (FULL): 4 MMHG
BH CV ECHO MEAS - AO MEAN PG: 6 MMHG
BH CV ECHO MEAS - AO ROOT AREA (BSA CORRECTED): 1.3
BH CV ECHO MEAS - AO ROOT AREA: 4.9 CM^2
BH CV ECHO MEAS - AO ROOT DIAM: 2.5 CM
BH CV ECHO MEAS - AO V2 MAX: 168 CM/SEC
BH CV ECHO MEAS - AO V2 MEAN: 106 CM/SEC
BH CV ECHO MEAS - AO V2 VTI: 37.3 CM
BH CV ECHO MEAS - AVA(I,A): 2.2 CM^2
BH CV ECHO MEAS - AVA(I,D): 2.2 CM^2
BH CV ECHO MEAS - AVA(V,A): 2 CM^2
BH CV ECHO MEAS - AVA(V,D): 2 CM^2
BH CV ECHO MEAS - BSA(HAYCOCK): 1.9 M^2
BH CV ECHO MEAS - BSA: 1.9 M^2
BH CV ECHO MEAS - BZI_BMI: 28.6 KILOGRAMS/M^2
BH CV ECHO MEAS - BZI_METRIC_HEIGHT: 165.1 CM
BH CV ECHO MEAS - BZI_METRIC_WEIGHT: 78 KG
BH CV ECHO MEAS - EDV(CUBED): 103.8 ML
BH CV ECHO MEAS - EDV(MOD-SP2): 62 ML
BH CV ECHO MEAS - EDV(MOD-SP4): 63 ML
BH CV ECHO MEAS - EDV(TEICH): 102.4 ML
BH CV ECHO MEAS - EF(CUBED): 83.1 %
BH CV ECHO MEAS - EF(MOD-BP): 70 %
BH CV ECHO MEAS - EF(MOD-SP2): 67.7 %
BH CV ECHO MEAS - EF(MOD-SP4): 69.8 %
BH CV ECHO MEAS - EF(TEICH): 76 %
BH CV ECHO MEAS - ESV(CUBED): 17.6 ML
BH CV ECHO MEAS - ESV(MOD-SP2): 20 ML
BH CV ECHO MEAS - ESV(MOD-SP4): 19 ML
BH CV ECHO MEAS - ESV(TEICH): 24.6 ML
BH CV ECHO MEAS - FS: 44.7 %
BH CV ECHO MEAS - IVS/LVPW: 1.1
BH CV ECHO MEAS - IVSD: 1.2 CM
BH CV ECHO MEAS - LA DIMENSION: 5 CM
BH CV ECHO MEAS - LA/AO: 2
BH CV ECHO MEAS - LAT PEAK E' VEL: 10 CM/SEC
BH CV ECHO MEAS - LV DIASTOLIC VOL/BSA (35-75): 34 ML/M^2
BH CV ECHO MEAS - LV MASS(C)D: 199.6 GRAMS
BH CV ECHO MEAS - LV MASS(C)DI: 107.6 GRAMS/M^2
BH CV ECHO MEAS - LV MAX PG: 3.9 MMHG
BH CV ECHO MEAS - LV MEAN PG: 2 MMHG
BH CV ECHO MEAS - LV SYSTOLIC VOL/BSA (12-30): 10.2 ML/M^2
BH CV ECHO MEAS - LV V1 MAX: 98.3 CM/SEC
BH CV ECHO MEAS - LV V1 MEAN: 70.9 CM/SEC
BH CV ECHO MEAS - LV V1 VTI: 23.2 CM
BH CV ECHO MEAS - LVIDD: 4.7 CM
BH CV ECHO MEAS - LVIDS: 2.6 CM
BH CV ECHO MEAS - LVLD AP2: 6.5 CM
BH CV ECHO MEAS - LVLD AP4: 5.7 CM
BH CV ECHO MEAS - LVLS AP2: 4.8 CM
BH CV ECHO MEAS - LVLS AP4: 4.4 CM
BH CV ECHO MEAS - LVOT AREA (M): 3.5 CM^2
BH CV ECHO MEAS - LVOT AREA: 3.5 CM^2
BH CV ECHO MEAS - LVOT DIAM: 2.1 CM
BH CV ECHO MEAS - LVPWD: 1.1 CM
BH CV ECHO MEAS - MED PEAK E' VEL: 10.1 CM/SEC
BH CV ECHO MEAS - MR MAX PG: 107.7 MMHG
BH CV ECHO MEAS - MR MAX VEL: 519 CM/SEC
BH CV ECHO MEAS - MV DEC SLOPE: 329 CM/SEC^2
BH CV ECHO MEAS - MV DEC TIME: 0.15 SEC
BH CV ECHO MEAS - MV E MAX VEL: 119 CM/SEC
BH CV ECHO MEAS - MV MAX PG: 7.6 MMHG
BH CV ECHO MEAS - MV MEAN PG: 2 MMHG
BH CV ECHO MEAS - MV P1/2T MAX VEL: 134 CM/SEC
BH CV ECHO MEAS - MV P1/2T: 119.3 MSEC
BH CV ECHO MEAS - MV V2 MAX: 138 CM/SEC
BH CV ECHO MEAS - MV V2 MEAN: 50.6 CM/SEC
BH CV ECHO MEAS - MV V2 VTI: 50.6 CM
BH CV ECHO MEAS - MVA P1/2T LCG: 1.6 CM^2
BH CV ECHO MEAS - MVA(P1/2T): 1.8 CM^2
BH CV ECHO MEAS - MVA(VTI): 1.6 CM^2
BH CV ECHO MEAS - PA ACC TIME: 0.1 SEC
BH CV ECHO MEAS - PA MAX PG (FULL): 1.6 MMHG
BH CV ECHO MEAS - PA MAX PG: 3.8 MMHG
BH CV ECHO MEAS - PA PR(ACCEL): 34.9 MMHG
BH CV ECHO MEAS - PA V2 MAX: 97.7 CM/SEC
BH CV ECHO MEAS - PVA(V,A): 2.9 CM^2
BH CV ECHO MEAS - PVA(V,D): 2.9 CM^2
BH CV ECHO MEAS - QP/QS: 0.85
BH CV ECHO MEAS - RAP SYSTOLE: 3 MMHG
BH CV ECHO MEAS - RV MAX PG: 2.2 MMHG
BH CV ECHO MEAS - RV MEAN PG: 1 MMHG
BH CV ECHO MEAS - RV V1 MAX: 73.9 CM/SEC
BH CV ECHO MEAS - RV V1 MEAN: 51.9 CM/SEC
BH CV ECHO MEAS - RV V1 VTI: 18 CM
BH CV ECHO MEAS - RVOT AREA: 3.8 CM^2
BH CV ECHO MEAS - RVOT DIAM: 2.2 CM
BH CV ECHO MEAS - RVSP: 40.5 MMHG
BH CV ECHO MEAS - SI(AO): 98.7 ML/M^2
BH CV ECHO MEAS - SI(CUBED): 46.5 ML/M^2
BH CV ECHO MEAS - SI(LVOT): 43.3 ML/M^2
BH CV ECHO MEAS - SI(MOD-SP2): 22.6 ML/M^2
BH CV ECHO MEAS - SI(MOD-SP4): 23.7 ML/M^2
BH CV ECHO MEAS - SI(TEICH): 41.9 ML/M^2
BH CV ECHO MEAS - SV(AO): 183.1 ML
BH CV ECHO MEAS - SV(CUBED): 86.2 ML
BH CV ECHO MEAS - SV(LVOT): 80.4 ML
BH CV ECHO MEAS - SV(MOD-SP2): 42 ML
BH CV ECHO MEAS - SV(MOD-SP4): 44 ML
BH CV ECHO MEAS - SV(RVOT): 68.4 ML
BH CV ECHO MEAS - SV(TEICH): 77.8 ML
BH CV ECHO MEAS - TAPSE (>1.6): 2.1 CM
BH CV ECHO MEAS - TR MAX VEL: 306 CM/SEC
BH CV ECHO MEASUREMENTS AVERAGE E/E' RATIO: 11.84
BH CV XLRA - RV BASE: 2.9 CM
BH CV XLRA - RV LENGTH: 6.4 CM
BH CV XLRA - RV MID: 2.4 CM
BH CV XLRA - TDI S': 12.5 CM/SEC
LEFT ATRIUM VOLUME INDEX: 38 ML/M2

## 2019-12-31 ENCOUNTER — OFFICE VISIT (OUTPATIENT)
Dept: CARDIOLOGY | Facility: CLINIC | Age: 69
End: 2019-12-31

## 2019-12-31 VITALS
HEART RATE: 79 BPM | WEIGHT: 168 LBS | BODY MASS INDEX: 27.99 KG/M2 | DIASTOLIC BLOOD PRESSURE: 68 MMHG | HEIGHT: 65 IN | SYSTOLIC BLOOD PRESSURE: 124 MMHG

## 2019-12-31 DIAGNOSIS — I10 ESSENTIAL HYPERTENSION: ICD-10-CM

## 2019-12-31 DIAGNOSIS — E78.5 HYPERLIPIDEMIA, UNSPECIFIED HYPERLIPIDEMIA TYPE: ICD-10-CM

## 2019-12-31 DIAGNOSIS — I48.19 PERSISTENT ATRIAL FIBRILLATION (HCC): Primary | ICD-10-CM

## 2019-12-31 DIAGNOSIS — I25.10 CORONARY ARTERY DISEASE INVOLVING NATIVE CORONARY ARTERY OF NATIVE HEART WITHOUT ANGINA PECTORIS: ICD-10-CM

## 2019-12-31 PROCEDURE — 99214 OFFICE O/P EST MOD 30 MIN: CPT | Performed by: NURSE PRACTITIONER

## 2019-12-31 NOTE — PROGRESS NOTES
Patient Name: Tatyana Reyes  :1950  Age: 69 y.o.  Primary Cardiologist: Abdi Ramesh MD  Encounter Provider:  TERESA Szymanski      Chief Complaint:   Chief Complaint   Patient presents with   • Hypertension         HPI this 69-year-old female, new to this provider, comes today in follow-up with complaints of recent leg swelling on increased dose of amlodipine.  Blood pressure is elevated again in office today, however patient states that this normally is much better controlled at other offices.  She does not keep track of this at home.  Of note, she recently underwent the watchman procedure.  She is scheduled for follow-up CHINTAN January 3, and is currently still on Coumadin.  Rivaroxaban was discontinued prior to this procedure per their protocol at [facility].  Last echo  revealed mild elation of LAC, mild MR/TR, EF 57.9% with no evidence of pericardial effusion.  Stress testing 2017 revealed a small sized mildly severe area of ischemia in the apex and inferior wall.  This led to cardiac catheterization on 2018 which revealed normal left main, LAD with 80% stenosis reduced to 0 status post RAOUL placement, circumflex codominant normal, RCA codominant and normal.  No recent lipid panel available for review at this time.  Patient currently complains of intermittent palpitations related to her atrial fibrillation, this is normal per her baseline.  Of note, she does have some mild intermittent chest pain as well which is not accompanied by shortness of breath.    Patient Active Problem List   Diagnosis   • Persistent atrial fibrillation   • Leg swelling   • Congestive heart failure (CMS/HCC)   • Essential hypertension   • Anticoagulated   • Abnormal cardiac function test   • Coronary artery disease involving native heart without angina pectoris   • Fatigue due to excessive exertion   • Abnormal EKG   • Spondylolisthesis of lumbar region   • Lumbar spinal  stenosis   • Hyperlipidemia           The following portions of the patient's history were reviewed and updated as appropriate: allergies, current medications, past family history, past medical history, past social history, past surgical history and problem list.    Current Outpatient Medications on File Prior to Visit   Medication Sig Dispense Refill   • amLODIPine (NORVASC) 10 MG tablet Take 5 mg by mouth Daily. TAKE 1/2 TABLET DAILY     • aspirin 81 MG chewable tablet Chew 81 mg Daily.     • B Complex-Biotin-FA (VITAMIN B50 COMPLEX PO) Take  by mouth.     • bisacodyl (DULCOLAX) 5 MG EC tablet Take 1 tablet by mouth Daily.     • Ferrous Sulfate (IRON) 325 (65 Fe) MG tablet Take 1 tablet by mouth Daily.     • furosemide (LASIX) 20 MG tablet Take 1 tablet by mouth Daily. 90 tablet 3   • levothyroxine (SYNTHROID, LEVOTHROID) 50 MCG tablet Take 50 mcg by mouth Daily.     • metoprolol succinate XL (TOPROL-XL) 50 MG 24 hr tablet 1 TAB DAILY 30 tablet 3   • Multiple Vitamins-Minerals (CENTRUM SILVER 50+WOMEN) tablet Take 1 tablet by mouth Daily.     • omeprazole (priLOSEC) 40 MG capsule      • rivaroxaban (XARELTO) 15 MG tablet Take 1 tablet by mouth Daily. 90 tablet 3   • rosuvastatin (CRESTOR) 5 MG tablet Take 5 mg by mouth Daily.     • tiZANidine (ZANAFLEX) 4 MG tablet      • traMADol (ULTRAM) 50 MG tablet 50 mg.     • valsartan (DIOVAN) 160 MG tablet Take 320 mg by mouth Daily. 1 tablet daily     • warfarin (COUMADIN) 5 MG tablet Take 5 mg by mouth Daily.       No current facility-administered medications on file prior to visit.        Past Medical History:   Diagnosis Date   • Atrial fibrillation (CMS/HCC)    • Atrial fibrillation (CMS/HCC)    • CHF (congestive heart failure) (CMS/HCC)    • Chronic kidney disease    • Disease of thyroid gland    • DVT (deep venous thrombosis) (CMS/HCC) 09/08/2017    BILAT LE S/P SHOULDER REPLACEMENT   • Dyspnea    • Hypertension    • Low back pain        Past Surgical History:    Procedure Laterality Date   • BLADDER REPAIR     • CARDIAC CATHETERIZATION N/A 1/5/2018    Procedure: Left Heart Cath;  Surgeon: Abdi Ramesh MD;  Location:  [facility];  Service:    • CARDIAC CATHETERIZATION N/A 1/5/2018    Procedure: Coronary angiography;  Surgeon: Abdi Ramesh MD;  Location:  [facility];  Service:    • CARDIAC CATHETERIZATION N/A 1/5/2018    Procedure: Left ventriculography;  Surgeon: Abdi Ramesh MD;  Location:  [facility];  Service:    • CARDIAC CATHETERIZATION N/A 1/5/2018    Procedure: Stent BMS coronary;  Surgeon: Abdi Ramesh MD;  Location:  [facility];  Service:    • EPIDURAL BLOCK     • PARATHYROID GLAND SURGERY     • KS RT/LT HEART CATHETERS N/A 1/5/2018    Procedure: Percutaneous Coronary Intervention;  Surgeon: Abdi Ramesh MD;  Location:  [facility];  Service: Cardiovascular   • TONSILLECTOMY     • TOTAL SHOULDER REPLACEMENT Left 09/08/2017   • TUBAL ABDOMINAL LIGATION         Family History   Problem Relation Age of Onset   • Heart disease Mother    • Stroke Mother    • Diabetes Brother        Social History     Socioeconomic History   • Marital status:      Spouse name: Not on file   • Number of children: 1   • Years of education: 12   • Highest education level: High school graduate   Occupational History   • Occupation: RETIRED   Social Needs   • Financial resource strain: Patient refused   • Food insecurity:     Worry: Patient refused     Inability: Patient refused   • Transportation needs:     Medical: Patient refused     Non-medical: Patient refused   Tobacco Use   • Smoking status: Never Smoker   • Smokeless tobacco: Never Used   Substance and Sexual Activity   • Alcohol use: No   • Drug use: No   • Sexual activity: Defer   Social History Narrative    LIVES WITH SPOUSE       Review of Systems   Constitution: Negative for diaphoresis and  "malaise/fatigue.   HENT: Negative for nosebleeds and stridor.    Cardiovascular: Positive for chest pain and palpitations. Negative for claudication, dyspnea on exertion, irregular heartbeat, leg swelling, near-syncope, orthopnea, paroxysmal nocturnal dyspnea and syncope.        Mild, intermittent, primarily noted at rest   Respiratory: Negative for cough, hemoptysis, shortness of breath and wheezing.    Gastrointestinal: Negative for abdominal pain, constipation, diarrhea, hematemesis, hematochezia, melena, nausea and vomiting.   Neurological: Negative for dizziness, focal weakness, light-headedness and weakness.       OBJECTIVE:   Vital Signs  Vitals:    12/31/19 1001   BP: 124/68   Pulse: 79     Estimated body mass index is 27.96 kg/m² as calculated from the following:    Height as of this encounter: 165.1 cm (65\").    Weight as of this encounter: 76.2 kg (168 lb).    Physical Exam   Constitutional: She is oriented to person, place, and time. She appears well-developed and well-nourished. No distress.   HENT:   Head: Normocephalic and atraumatic.   Eyes: Pupils are equal, round, and reactive to light. Conjunctivae and EOM are normal.   Neck: Normal range of motion. Neck supple. No JVD present. No tracheal deviation present. No thyromegaly present.   Cardiovascular: Normal rate, regular rhythm and intact distal pulses. Exam reveals no gallop and no friction rub.   Murmur heard.  Systolic LSB   Pulmonary/Chest: Effort normal and breath sounds normal. No respiratory distress. She has no wheezes. She has no rales. She exhibits no tenderness.   Abdominal: Soft. Bowel sounds are normal. She exhibits no distension. There is no tenderness.   Musculoskeletal: Normal range of motion. She exhibits no edema, tenderness or deformity.   Neurological: She is alert and oriented to person, place, and time.   Skin: Skin is warm and dry. No rash noted. She is not diaphoretic. No erythema. No pallor.   Psychiatric: She has a normal " mood and affect. Her behavior is normal.       Procedures    Cardiac Cath:  18  HEMODYNAMIC / ANGIOGRAPHIC DATA:    1. Left ventricular end diastolic pressure was 10 mmHg.  2. Left ventriculography revealed an EF around 50%.    3. The left main is normal.  4. The left anterior descending artery is proximal 80% reduced to 0% with 3.0/15 dilated to 3.2  Vision stent.  5. The left circumflex is codominant and normal.  6. The right coronary artery is codominant and normal.  7. Successful stenting of the proximal LAD, with reduction of stenosis from 80% to 0% using 3.0/15 dilated to 3.2.     KEENA FLOW    PRE... 3....       POST.  3.....     TYPE OF LESION...... B  1.......     RECOMMENDATIONS:  Post-procedure care will focus on prevention of any ischemic events and congestive complications.  Aggressive risk factor modification will be carried out.  Importance of taking dual antiplatelets for one year  has been explained, risk of stent thrombosis leading to the acute MI, which carries high morbidity and mortality has been explained     Discontinuation or interruptions of these medications should be under the strict guidance of appropriate health professional     Vision stent was placed as the patient needed shoulder surgery    Stress Testin17  Interpretation Summary        · Findings consistent with a normal ECG stress test.  · Left ventricular ejection fraction is normal (Calculated EF = 60%).  · Myocardial perfusion imaging indicates a small-sized, mildly severe area of ischemia located in the apex and inferior wall.  · Impressions are consistent with an intermediate risk study.     Asymptomatic for chest pain. ECG is negative for ischemia.   Ectopy: baseline atrial fib  B/P is appropriate.  Pharmacologic study due to inability to tolerate walking on treadmill.         Cardiac Echo:  10/31/17  Interpretation Summary     · Left atrial cavity size is mildly dilated.  · Mild mitral valve regurgitation is  present  · Mild tricuspid valve regurgitation is present.  · Left Ventricle: Calculated EF = 57.9%.  · There is no evidence of pericardial effusion           ASSESSMENT:      Diagnosis Plan   1. Persistent atrial fibrillation     2. Essential hypertension     3. Coronary artery disease involving native coronary artery of native heart without angina pectoris     4. Hyperlipidemia, unspecified hyperlipidemia type           PLAN OF CARE:     1.  Hypertension- blood pressure in office today is now much better controlled after increase in metoprolol, and PCP has increased valsartan.    Importance of controlling hypertension and blood pressure  checkup on the regular basis has been explained  Hypertension as a silent killer has been discussed  Risk reduction of the weight and regular exercises to control the hypertension has been explained      2.  Persistent atrial fibrillation- watchman procedure performed at [place], [facility] scheduled and follow-up with them on January 3.  She currently remains anticoagulated on warfarin per watchman protocol.    3.  Hyperlipidemia- remains without recent lab work.  Will obtain FLP and CMP prior to next office visit.    Risk of the hyperlipidemia, importance of the treatment has been explained  Pros and cons of the statins has been explained  Regular blood workup as well as side effects including the liver failure, myelopathy death has been explained       4.  Chest pain- noted previously at last office visit.  At that time the patient wished to defer stress testing until after CHINTAN was performed.  She states this is currently resolved since blood pressure has been better controlled.      Continue current plan of care, follow-up in 3 months or sooner if needed.  If patient experiences chest pain again she will call the office to schedule stress testing.        Sincerely,   TERESA Szymanski  [facility]  12/31/19  10:33 AM

## 2020-03-02 RX ORDER — METOPROLOL SUCCINATE 50 MG/1
TABLET, EXTENDED RELEASE ORAL
Qty: 90 TABLET | Refills: 0 | Status: SHIPPED | OUTPATIENT
Start: 2020-03-02

## 2020-06-30 ENCOUNTER — OFFICE VISIT (OUTPATIENT)
Dept: CARDIOLOGY | Facility: CLINIC | Age: 70
End: 2020-06-30

## 2020-06-30 VITALS
HEIGHT: 65 IN | BODY MASS INDEX: 27.32 KG/M2 | WEIGHT: 164 LBS | HEART RATE: 73 BPM | DIASTOLIC BLOOD PRESSURE: 75 MMHG | SYSTOLIC BLOOD PRESSURE: 160 MMHG

## 2020-06-30 DIAGNOSIS — I48.19 PERSISTENT ATRIAL FIBRILLATION (HCC): Primary | ICD-10-CM

## 2020-06-30 DIAGNOSIS — E78.5 HYPERLIPIDEMIA, UNSPECIFIED HYPERLIPIDEMIA TYPE: ICD-10-CM

## 2020-06-30 DIAGNOSIS — I50.9 CONGESTIVE HEART FAILURE, UNSPECIFIED HF CHRONICITY, UNSPECIFIED HEART FAILURE TYPE (HCC): ICD-10-CM

## 2020-06-30 DIAGNOSIS — I10 ESSENTIAL HYPERTENSION: ICD-10-CM

## 2020-06-30 PROCEDURE — 99213 OFFICE O/P EST LOW 20 MIN: CPT | Performed by: NURSE PRACTITIONER

## 2020-06-30 PROCEDURE — 93000 ELECTROCARDIOGRAM COMPLETE: CPT | Performed by: NURSE PRACTITIONER

## 2020-06-30 NOTE — PROGRESS NOTES
Subjective:        Tatyana Reyes is a 70 y.o. female who here for follow up    No chief complaint on file.  She is here for a 6 months follow-up.      HPI    Tatyana Reyes is a 70-year-old female, who is new to this provider.  She has a history of persistent atrial fibrillation, GI bleeding, KINGS, renal insufficiency, congestive heart failure, CKD, thyroid disease, DVT, watchman's procedure ( 11/13/19), and hypertension.  His lipid panel on 3/28/2019 revealed , HDL 56, LDL 76, and triglycerides 52.  Good control noted.  She states her PCP manages her cholesterol.  Previous echo on 12/17/2019 revealed EF 70%, mild LV regurgitation, LAC size mildly dilated, calcification of aortic valve, mild to moderate TV regurgitation is present, no evidence of pericardial effusion. Stress test on 2018 revealed ECG evidence of myocardial ischemia.  Negative clinical evidence of myocardial ischemia.  Findings consistent with apical ECG stress test.  Submaximal stress test asymptomatic for chest pain.  Medical management was decided at that time.  Cardiac cath 1/5/2018 revealed EF 50%, LAD tear is approximately 80% reduced to 0% with 3.0/15 dilated to 3.2 vision stent, left circumflex is codominant and normal, RCA is codominant and normal, successful stenting of the proximal LAD with reduction of stenosis from 80% to 0% using 3.0/15 dilated to 3.1.  Recommendations at the time was dual antiplatelet therapy for at least 1 year.     Denies chest pain, shortness of breath, syncope and near syncope.      The following portions of the patient's history were reviewed and updated as appropriate: allergies, current medications, past family history, past medical history, past social history, past surgical history and problem list.    Past Medical History:   Diagnosis Date   • Atrial fibrillation (CMS/HCC)    • Atrial fibrillation (CMS/HCC)    • CHF (congestive heart failure) (CMS/HCC)    • Chronic kidney disease    • Disease of  thyroid gland    • DVT (deep venous thrombosis) (CMS/[facility]) 09/08/2017    BILAT LE S/P SHOULDER REPLACEMENT   • Dyspnea    • Hypertension    • Low back pain          reports that she has never smoked. She has never used smokeless tobacco. She reports that she does not drink alcohol or use drugs.     Family History   Problem Relation Age of Onset   • Heart disease Mother    • Stroke Mother    • Diabetes Brother        ROS     Review of Systems  Constitutional: No wt loss, fever, fatigue  Gastrointestinal: No nausea, abdominal pain  Behavioral/Psych: No insomnia or anxiety  Cardiovascular: Denies chest pain, shortness of breath, syncope and near syncope.      Objective:           Physical Exam   Constitutional: She is oriented to person, place, and time. She appears well-developed and well-nourished.   HENT:   Head: Normocephalic.   Right Ear: External ear normal.   Left Ear: External ear normal.   Eyes: EOM are normal.   Neck: Normal range of motion. No JVD present.   Cardiovascular: Normal rate, regular rhythm and intact distal pulses. Exam reveals no gallop and no friction rub.   Murmur heard.  Pulmonary/Chest: Effort normal and breath sounds normal. No stridor. No respiratory distress. She has no rales.   Abdominal: Soft. Bowel sounds are normal. She exhibits no distension. There is no tenderness. There is no guarding.   Musculoskeletal: Normal range of motion. She exhibits no edema or tenderness.   Neurological: She is alert and oriented to person, place, and time. She has normal reflexes.   Skin: Skin is warm.   Psychiatric: She has a normal mood and affect. Judgment normal.   Nursing note and vitals reviewed.        ECG 12 Lead  Date/Time: 6/30/2020 10:45 AM  Performed by: Shona Fowler APRN  Authorized by: Shona Fowler APRN   Comparison: compared with previous ECG from 6/17/2019  Similar to previous ECG  Rhythm: atrial fibrillation             Interpretation Summary     · Mild mitral valve  regurgitation is present  · Left atrial cavity size is mildly dilated.  · There is calcification of the aortic valve.  · Mild to moderate tricuspid valve regurgitation is present.  · Calculated EF = 70%  · There is no evidence of pericardial effusion.   Interpretation Summary        · Arrhythmias were not significant.  · Equivocal ECG evidence of myocardial ischemia.Negative clinical evidence of myocardial ischemia. Findings consistent with an equivocal ECG stress test. Submaximal Stress Test Asymptomatic for chest pain     Submaximal Stress Test  Asymptomatic for chest pain. Specificity of study reduced secondary to Target Heart Rate not achieved.  Ectopy: none.  Blood pressure response:  appropriate. Exercise tolerance good.         HEMODYNAMIC / ANGIOGRAPHIC DATA:    1. Left ventricular end diastolic pressure was 10 mmHg.  2. Left ventriculography revealed an EF around 50%.    3. The left main is normal.  4. The left anterior descending artery is proximal 80% reduced to 0% with 3.0/15 dilated to 3.2  Vision stent.  5. The left circumflex is codominant and normal.  6. The right coronary artery is codominant and normal.  7. Successful stenting of the proximal LAD, with reduction of stenosis from 80% to 0% using 3.0/15 dilated to 3.2.     KEENA FLOW    PRE... 3....       POST.  3.....     TYPE OF LESION...... B  1.......     RECOMMENDATIONS:  Post-procedure care will focus on prevention of any ischemic events and congestive complications.  Aggressive risk factor modification will be carried out.  Importance of taking dual antiplatelets for one year  has been explained,    Current Outpatient Medications:   •  amLODIPine (NORVASC) 10 MG tablet, Take 5 mg by mouth Daily. TAKE 1/2 TABLET DAILY, Disp: , Rfl:   •  aspirin 81 MG chewable tablet, Chew 81 mg Daily., Disp: , Rfl:   •  B Complex-Biotin-FA (VITAMIN B50 COMPLEX PO), Take  by mouth., Disp: , Rfl:   •  bisacodyl (DULCOLAX) 5 MG EC tablet, Take 1 tablet by mouth  Daily., Disp: , Rfl:   •  Ferrous Sulfate (IRON) 325 (65 Fe) MG tablet, Take 1 tablet by mouth Daily., Disp: , Rfl:   •  furosemide (LASIX) 20 MG tablet, Take 1 tablet by mouth Daily., Disp: 90 tablet, Rfl: 3  •  levothyroxine (SYNTHROID, LEVOTHROID) 50 MCG tablet, Take 50 mcg by mouth Daily., Disp: , Rfl:   •  metoprolol succinate XL (TOPROL-XL) 50 MG 24 hr tablet, TAKE 1 TABLET BY MOUTH EVERY DAY, Disp: 90 tablet, Rfl: 0  •  Multiple Vitamins-Minerals (CENTRUM SILVER 50+WOMEN) tablet, Take 1 tablet by mouth Daily., Disp: , Rfl:   •  omeprazole (priLOSEC) 40 MG capsule, , Disp: , Rfl:   •  rivaroxaban (XARELTO) 15 MG tablet, Take 1 tablet by mouth Daily., Disp: 90 tablet, Rfl: 3  •  rosuvastatin (CRESTOR) 5 MG tablet, Take 5 mg by mouth Daily., Disp: , Rfl:   •  tiZANidine (ZANAFLEX) 4 MG tablet, , Disp: , Rfl:   •  traMADol (ULTRAM) 50 MG tablet, 50 mg., Disp: , Rfl:   •  valsartan (DIOVAN) 160 MG tablet, Take 320 mg by mouth Daily. 1 tablet daily, Disp: , Rfl:   •  warfarin (COUMADIN) 5 MG tablet, Take 5 mg by mouth Daily., Disp: , Rfl:      Assessment:        Patient Active Problem List   Diagnosis   • Persistent atrial fibrillation (CMS/HCC)   • Leg swelling   • Congestive heart failure (CMS/HCC)   • Essential hypertension   • Anticoagulated   • Abnormal cardiac function test   • Coronary artery disease involving native heart without angina pectoris   • Fatigue due to excessive exertion   • Abnormal EKG   • Spondylolisthesis of lumbar region   • Lumbar spinal stenosis   • Hyperlipidemia               Plan:   1.  Persistent atrial fibrillation: Watchman procedure performed at [facility]. She recently has been taken off her coumadin.     2.  Hypertension blood pressures controlled in the office today is slightly high. She states her blood pressure is controlled at home. Continue metoprolol, and valsartan.    Educated patient on exercising for at least 30 minutes a day for 2 to 3 days a week. Importance of  controlling hypertension and blood pressure checkup on the regular basis has been explained. Hypertension as a silent killer has been discussed. Risk reduction of the weight and regular exercises to control the hypertension has been explained.    3.  Hyperlipidemia: He is lipid profile in 2020 showed good control.  PCP manages her cholesterol.    Risk of the hyperlipidemia, importance of the treatment has been explained. Pros and cons of the statins has been explained. Regular blood workup as well as side effects including the liver failure, myelopathy death has been explained.    4.  Chest pain: Noted at last exam she was wanting to defer stress testing at that time.She states she no longer has chest pain and does not want to do any stress testing. She will go to return to ER per EMS for any recurrent symptoms including, but not limited to: chest pain/pressure/tightness, weakness, Shortness of breath, dizziness, palpitations, near syncope or syncope.       There are no diagnoses linked to this encounter.    COUNSELING: Hemalatha GoldtCounseling was given to patient for the following topics: diagnostic results, risk factor reductions, impressions, risks and benefits of treatment options and importance of treatment compliance .       SMOKING COUNSELING:  denies    She will follow up in 6 months, unless she needs to be seen sooner.    Sincerely,   TERESA Cisneros  [facility]  06/30/20  10:43    EMR Dragon/Transcription disclaimer:   Much of this encounter note is an electronic transcription/translation of spoken language to printed text. The electronic translation of spoken language may permit erroneous, or at times, nonsensical words or phrases to be inadvertently transcribed; Although I have reviewed the note for such errors, some may still exist.

## 2020-07-21 ENCOUNTER — TELEPHONE (OUTPATIENT)
Dept: NEUROSURGERY | Facility: CLINIC | Age: 70
End: 2020-07-21

## 2020-07-21 DIAGNOSIS — M43.16 SPONDYLOLISTHESIS OF LUMBAR REGION: Primary | ICD-10-CM

## 2020-07-21 NOTE — TELEPHONE ENCOUNTER
PT CALLED AND STATES THAT SHE RECEIVED A COUPLE OF EPIDURAL BLOCKS AT [facility] AND WAS UNABLE TO CONTINUE DUE TO THE COVID 19/STATES SHE WAS INTERESTED IN RESTARTING THE BLOCKS BUT WAS TOLD SHE WOULD NEED A NEW REFERRAL TO CONTINUE BLOCKS/PT STATES BLOCKS WERE HELPING AND FEELS LIKE HER PAIN HAS INCREASED SINCE SHE HAS NOT BEEN ABLE TO GET THE BLOCKS/PT STATES PAIN IS RADIATING DOWN BOTH LEGS PRIMARILY DOWN THE LEFT LEG.    PLEASE CONTACT PT AT: [phone]    THANK YOU!

## 2020-07-23 NOTE — TELEPHONE ENCOUNTER
New order placed and sent to scheduling. Patient is aware. She will call after this series if she is no better.

## 2020-07-23 NOTE — TELEPHONE ENCOUNTER
Patient was last seen 6/13/2019 for back pain that radiates into bilateral lower extremity's R>L we referred her to get TIA. Ok to order another set of TIA?

## 2020-07-23 NOTE — TELEPHONE ENCOUNTER
PATIENT CALLING TO CHECK ON THE STATUS OF REQUEST. PATIENT STATES NO PREV. CALLBACK. PATIENT COMPLAINS OF INCRESED PAIN IN BACK AND BOTH LEGS. PATIENT REQUESTING FOR NEW ORDER OF INJECTIONS TO BE SENT TO  [facility]. PH: [phone]    PATIENT CAN BE CONTACTED [phone] WITH AN UPDATE

## 2020-08-05 ENCOUNTER — HOSPITAL ENCOUNTER (OUTPATIENT)
Dept: GENERAL RADIOLOGY | Facility: HOSPITAL | Age: 70
Discharge: HOME | End: 2020-08-05

## 2020-08-05 ENCOUNTER — ANESTHESIA (OUTPATIENT)
Dept: PAIN MEDICINE | Facility: HOSPITAL | Age: 70
End: 2020-08-05

## 2020-08-05 ENCOUNTER — HOSPITAL ENCOUNTER (OUTPATIENT)
Dept: PAIN MEDICINE | Facility: HOSPITAL | Age: 70
Discharge: HOME | End: 2020-08-05
Admitting: NEUROLOGICAL SURGERY

## 2020-08-05 ENCOUNTER — ANESTHESIA EVENT (OUTPATIENT)
Dept: PAIN MEDICINE | Facility: HOSPITAL | Age: 70
End: 2020-08-05

## 2020-08-05 VITALS
TEMPERATURE: 98 F | WEIGHT: 165 LBS | HEIGHT: 65 IN | HEART RATE: 80 BPM | BODY MASS INDEX: 27.49 KG/M2 | SYSTOLIC BLOOD PRESSURE: 156 MMHG | OXYGEN SATURATION: 96 % | DIASTOLIC BLOOD PRESSURE: 86 MMHG | RESPIRATION RATE: 16 BRPM

## 2020-08-05 DIAGNOSIS — M43.16 SPONDYLOLISTHESIS OF LUMBAR REGION: ICD-10-CM

## 2020-08-05 DIAGNOSIS — M48.062 SPINAL STENOSIS OF LUMBAR REGION WITH NEUROGENIC CLAUDICATION: Primary | ICD-10-CM

## 2020-08-05 DIAGNOSIS — R52 PAIN: ICD-10-CM

## 2020-08-05 PROCEDURE — 77003 FLUOROGUIDE FOR SPINE INJECT: CPT

## 2020-08-05 PROCEDURE — 0 IOPAMIDOL 41 % SOLUTION: Performed by: ANESTHESIOLOGY

## 2020-08-05 PROCEDURE — 25010000002 METHYLPREDNISOLONE PER 80 MG: Performed by: ANESTHESIOLOGY

## 2020-08-05 PROCEDURE — 25010000002 MIDAZOLAM PER 1 MG: Performed by: ANESTHESIOLOGY

## 2020-08-05 PROCEDURE — C1755 CATHETER, INTRASPINAL: HCPCS | Performed by: NURSE PRACTITIONER

## 2020-08-05 RX ORDER — METHYLPREDNISOLONE ACETATE 80 MG/ML
80 INJECTION, SUSPENSION INTRA-ARTICULAR; INTRALESIONAL; INTRAMUSCULAR; SOFT TISSUE ONCE
Status: COMPLETED | OUTPATIENT
Start: 2020-08-05 | End: 2020-08-05

## 2020-08-05 RX ORDER — SODIUM CHLORIDE 0.9 % (FLUSH) 0.9 %
3-10 SYRINGE (ML) INJECTION AS NEEDED
Status: DISCONTINUED | OUTPATIENT
Start: 2020-08-05 | End: 2020-08-06 | Stop reason: HOSPADM

## 2020-08-05 RX ORDER — MIDAZOLAM HYDROCHLORIDE 1 MG/ML
1 INJECTION INTRAMUSCULAR; INTRAVENOUS
Status: DISCONTINUED | OUTPATIENT
Start: 2020-08-05 | End: 2020-08-06 | Stop reason: HOSPADM

## 2020-08-05 RX ORDER — SODIUM CHLORIDE 0.9 % (FLUSH) 0.9 %
3 SYRINGE (ML) INJECTION EVERY 12 HOURS SCHEDULED
Status: DISCONTINUED | OUTPATIENT
Start: 2020-08-05 | End: 2020-08-06 | Stop reason: HOSPADM

## 2020-08-05 RX ORDER — FENTANYL CITRATE 50 UG/ML
50 INJECTION, SOLUTION INTRAMUSCULAR; INTRAVENOUS AS NEEDED
Status: DISCONTINUED | OUTPATIENT
Start: 2020-08-05 | End: 2020-08-06 | Stop reason: HOSPADM

## 2020-08-05 RX ORDER — LIDOCAINE HYDROCHLORIDE 10 MG/ML
1 INJECTION, SOLUTION INFILTRATION; PERINEURAL ONCE
Status: DISCONTINUED | OUTPATIENT
Start: 2020-08-05 | End: 2020-08-06 | Stop reason: HOSPADM

## 2020-08-05 RX ORDER — LIDOCAINE HYDROCHLORIDE 10 MG/ML
0.5 INJECTION, SOLUTION INFILTRATION; PERINEURAL ONCE AS NEEDED
Status: DISCONTINUED | OUTPATIENT
Start: 2020-08-05 | End: 2020-08-06 | Stop reason: HOSPADM

## 2020-08-05 RX ADMIN — MIDAZOLAM 2 MG: 1 INJECTION INTRAMUSCULAR; INTRAVENOUS at 12:31

## 2020-08-05 RX ADMIN — METHYLPREDNISOLONE ACETATE 80 MG: 80 INJECTION, SUSPENSION INTRA-ARTICULAR; INTRALESIONAL; INTRAMUSCULAR; SOFT TISSUE at 13:36

## 2020-08-05 RX ADMIN — IOPAMIDOL 10 ML: 408 INJECTION, SOLUTION INTRATHECAL at 13:35

## 2020-08-05 NOTE — ANESTHESIA PROCEDURE NOTES
PAIN Epidural block    Pre-sedation assessment completed: 8/5/2020 1:21 PM    Patient reassessed immediately prior to procedure    Patient location during procedure: pain clinic  Start Time: 8/5/2020 1:21 PM  Stop Time: 8/5/2020 1:37 PM  Indication:at surgeon's request and procedure for pain  Performed By  Anesthesiologist: Tyrone Hill MD  Preanesthetic Checklist  Completed: patient identified, site marked, surgical consent, pre-op evaluation, timeout performed, IV checked, risks and benefits discussed and monitors and equipment checked  Additional Notes  Dx:  Post-Op Diagnosis Codes:     * Lumbar disc displacement without myelopathy (M51.26)     * Spondylolisthesis of lumbar region (M43.16)     * Lumbar neuritis (M54.16)  80 mg depomedrol in epid    Plan : return to clinic as needed  Prep:  Pt Position:prone (prone)  Sterile Tech:cap, gloves, mask and sterile barrier  Prep:chlorhexidine gluconate and isopropyl alcohol  Monitoring:blood pressure monitoring, EKG and continuous pulse oximetry  Procedure:Sedation: no     Approach:midline  Guidance: fluoroscopy and c arm pa and lat and loss of resistance  Location:lumbar  Level:4-5 (interlaminar)  Needle Type:Hangar Seventead  Needle Gauge:20  Aspiration:negative  Medications:  Depomedrol:80 mg  Preservative Free Saline:3mL  Isovue:2mL    Post Assessment:  Post-procedure: bandaid.  Pt Tolerance:patient tolerated the procedure well with no apparent complications  Complications:no

## 2020-08-05 NOTE — H&P
[facility]    History and Physical    Patient Name: Tatyana Reyes  :  1950  MRN:  [id number]  Date of Admission: 2020    Subjective     Patient is a 70 y.o. female presents with chief complaint of chronic, constant, severe, 7/10, unknown etiology, sharp low back, leg: bilateral and L>R pain.  Onset of symptoms was gradual starting 1 month ago, but has had pain for several years.  Symptoms are associated/aggravated by nothing in particular. Symptoms improve with ice and lying down    The following portions of the patients history were reviewed and updated as appropriate: current medications, allergies, past medical history, past surgical history, past family history, past social history and problem list                Objective     Past Medical History:   Past Medical History:   Diagnosis Date   • Atrial fibrillation (CMS/[facility])    • Atrial fibrillation (CMS/[facility])    • CHF (congestive heart failure) (CMS/[facility])    • Chronic kidney disease    • Disease of thyroid gland    • DVT (deep venous thrombosis) (CMS/[facility]) 2017    BILAT LE S/P SHOULDER REPLACEMENT   • Dyspnea    • Hypertension    • Low back pain      Past Surgical History:   Past Surgical History:   Procedure Laterality Date   • BLADDER REPAIR     • CARDIAC CATHETERIZATION N/A 2018    Procedure: Left Heart Cath;  Surgeon: Abdi Ramesh MD;  Location: [facility] INVASIVE LOCATION;  Service:    • CARDIAC CATHETERIZATION N/A 2018    Procedure: Coronary angiography;  Surgeon: Abdi Ramesh MD;  Location: [facility] INVASIVE LOCATION;  Service:    • CARDIAC CATHETERIZATION N/A 2018    Procedure: Left ventriculography;  Surgeon: Abdi Ramesh MD;  Location: [facility] INVASIVE LOCATION;  Service:    • CARDIAC CATHETERIZATION N/A 2018    Procedure: Stent BMS coronary;  Surgeon: Abdi Ramesh MD;  Location: [facility] INVASIVE LOCATION;  Service:    • EPIDURAL BLOCK     • PARATHYROID GLAND SURGERY      • [place] RT/LT HEART CATHETERS N/A 1/5/2018    Procedure: Percutaneous Coronary Intervention;  Surgeon: Abdi Ramesh MD;  Location: [facility] INVASIVE LOCATION;  Service: Cardiovascular   • TONSILLECTOMY     • TOTAL SHOULDER REPLACEMENT Left 09/08/2017   • TUBAL ABDOMINAL LIGATION       Family History:   Family History   Problem Relation Age of Onset   • Heart disease Mother    • Stroke Mother    • Diabetes Brother      Social History:   Social History     Tobacco Use   • Smoking status: Never Smoker   • Smokeless tobacco: Never Used   Substance Use Topics   • Alcohol use: No   • Drug use: No       Vital Signs Range for the last 24 hours  Temperature:     Temp Source:     BP:     Pulse:     Respirations:     SPO2:     O2 Amount (l/min):     O2 Devices     Weight:           --------------------------------------------------------------------------------    Current Outpatient Medications   Medication Sig Dispense Refill   • amLODIPine (NORVASC) 10 MG tablet Take 5 mg by mouth Daily. TAKE 1/2 TABLET DAILY     • B Complex-Biotin-FA (VITAMIN B50 COMPLEX PO) Take  by mouth.     • bisacodyl (DULCOLAX) 5 MG EC tablet Take 1 tablet by mouth Daily.     • Ferrous Sulfate (IRON) 325 (65 Fe) MG tablet Take 1 tablet by mouth Daily.     • furosemide (LASIX) 20 MG tablet Take 1 tablet by mouth Daily. 90 tablet 3   • levothyroxine (SYNTHROID, LEVOTHROID) 50 MCG tablet Take 50 mcg by mouth Daily.     • metoprolol succinate XL (TOPROL-XL) 50 MG 24 hr tablet TAKE 1 TABLET BY MOUTH EVERY DAY 90 tablet 0   • Multiple Vitamins-Minerals (CENTRUM SILVER 50+WOMEN) tablet Take 1 tablet by mouth Daily.     • omeprazole (priLOSEC) 40 MG capsule      • rosuvastatin (CRESTOR) 5 MG tablet Take 5 mg by mouth Daily.     • tiZANidine (ZANAFLEX) 4 MG tablet      • valsartan (DIOVAN) 160 MG tablet Take 320 mg by mouth Daily. 1 tablet daily     • aspirin 81 MG chewable tablet Chew 81 mg Daily.     • traMADol (ULTRAM) 50 MG tablet 50 mg.        Current Facility-Administered Medications   Medication Dose Route Frequency Provider Last Rate Last Dose   • fentaNYL citrate (PF) (SUBLIMAZE) injection 50 mcg  50 mcg Intravenous PRN Tyrone Hill MD       • iopamidol (ISOVUE-M 200) injection 41%  3 mL Epidural Once in imaging Tyrone Hill MD       • lidocaine (XYLOCAINE) 1 % injection 1 mL  1 mL Intradermal Once Tyrone Hill MD       • methylPREDNISolone acetate (DEPO-medrol) injection 80 mg  80 mg Epidural Once Tyrone Hill MD       • midazolam (VERSED) injection 1 mg  1 mg Intravenous Q5 Min PRN Tyrone Hill MD           --------------------------------------------------------------------------------  Assessment/Plan      Anesthesia Evaluation     Patient summary reviewed and Nursing notes reviewed         Pain impairs ability to perform ADLs: Housekeeping, Exercise/Activity, Sleeping, Driving and Working  Modalities previously tried to control pain with limited effectiveness within the last 4-6 weeks: Ice, Rest and Heat     Airway   Mallampati: II  Dental - normal exam     Pulmonary - negative pulmonary ROS and normal exam   Cardiovascular - normal exam    (+) hypertension, CAD, dysrhythmias Atrial Fib, CHF , DVT, hyperlipidemia,       Neuro/Psych- negative ROS and neuro exam normal  GI/Hepatic/Renal/Endo    (+)   renal disease,     Musculoskeletal (-) negative ROS and normal exam    Abdominal  - normal exam   Substance History - negative use     OB/GYN negative ob/gyn ROS         Other                 Diagnosis and Plan    Treatment Plan  ASA 3      Procedures: Lumbar Epidural Steroid Injection(LESI), With fluoroscopy,       Anesthetic plan and risks discussed with patient.          Diagnosis     * Lumbar disc displacement without myelopathy [M51.26]     * Spondylolisthesis of lumbar region [M43.16]     * Lumbar neuritis [M54.16]

## 2021-01-07 ENCOUNTER — OFFICE VISIT (OUTPATIENT)
Dept: CARDIOLOGY | Facility: CLINIC | Age: 71
End: 2021-01-07

## 2021-01-07 VITALS
WEIGHT: 168 LBS | SYSTOLIC BLOOD PRESSURE: 130 MMHG | HEIGHT: 65 IN | BODY MASS INDEX: 27.99 KG/M2 | DIASTOLIC BLOOD PRESSURE: 88 MMHG | HEART RATE: 76 BPM

## 2021-01-07 DIAGNOSIS — E78.5 HYPERLIPIDEMIA, UNSPECIFIED HYPERLIPIDEMIA TYPE: ICD-10-CM

## 2021-01-07 DIAGNOSIS — R94.30 ABNORMAL CARDIAC FUNCTION TEST: ICD-10-CM

## 2021-01-07 DIAGNOSIS — R94.31 ABNORMAL ELECTROCARDIOGRAM (ECG) (EKG): ICD-10-CM

## 2021-01-07 DIAGNOSIS — I48.19 PERSISTENT ATRIAL FIBRILLATION (HCC): ICD-10-CM

## 2021-01-07 DIAGNOSIS — Z95.818 PRESENCE OF WATCHMAN LEFT ATRIAL APPENDAGE CLOSURE DEVICE: Primary | ICD-10-CM

## 2021-01-07 DIAGNOSIS — Z79.01 ANTICOAGULATED: ICD-10-CM

## 2021-01-07 PROCEDURE — 99213 OFFICE O/P EST LOW 20 MIN: CPT | Performed by: INTERNAL MEDICINE

## 2021-01-07 PROCEDURE — 93000 ELECTROCARDIOGRAM COMPLETE: CPT | Performed by: INTERNAL MEDICINE

## 2021-01-07 NOTE — PROGRESS NOTES
6 MO FOLLOW UP   Subjective:        Tatyana Reyes is a 70 y.o. female who here for follow up    CC  afib    Occasional lt sided cp  HPI  70-year-old female with known history of hyperlipidemia, on chronic anticoagulation as well as atrial fibrillation along with a watchman procedure here for the follow-up has occasionally left-sided mild to moderate intensity chest pain     Problems Addressed this Visit        Other    Abnormal cardiac function test    Relevant Orders    Adult Transthoracic Echo Complete W/ Cont if Necessary Per Protocol    Anticoagulated    Relevant Orders    Adult Transthoracic Echo Complete W/ Cont if Necessary Per Protocol    Hyperlipidemia    Relevant Orders    Adult Transthoracic Echo Complete W/ Cont if Necessary Per Protocol    Presence of Watchman left atrial appendage closure device - Primary    Relevant Orders    Adult Transthoracic Echo Complete W/ Cont if Necessary Per Protocol      Other Visit Diagnoses     Abnormal electrocardiogram (ECG) (EKG)         Relevant Orders    Adult Transthoracic Echo Complete W/ Cont if Necessary Per Protocol      Diagnoses       Codes Comments    Presence of Watchman left atrial appendage closure device    -  Primary ICD-10-CM: Z95.818  ICD-9-CM: V45.09     Hyperlipidemia, unspecified hyperlipidemia type     ICD-10-CM: E78.5  ICD-9-CM: 272.4     Anticoagulated     ICD-10-CM: Z79.01  ICD-9-CM: V58.61     Abnormal cardiac function test     ICD-10-CM: R94.30  ICD-9-CM: 794.30     Abnormal electrocardiogram (ECG) (EKG)      ICD-10-CM: R94.31  ICD-9-CM: 794.31         .    The following portions of the patient's history were reviewed and updated as appropriate: allergies, current medications, past family history, past medical history, past social history, past surgical history and problem list.    Past Medical History:   Diagnosis Date   • Atrial fibrillation (CMS/HCC)    • Atrial fibrillation (CMS/HCC)    • CHF (congestive heart failure) (CMS/HCC)    •  "Chronic kidney disease    • Disease of thyroid gland    • DVT (deep venous thrombosis) (CMS/[facility]) 09/08/2017    BILAT LE S/P SHOULDER REPLACEMENT   • Dyspnea    • Hypertension    • Low back pain      reports that she has never smoked. She has never used smokeless tobacco. She reports that she does not drink alcohol or use drugs.   Family History   Problem Relation Age of Onset   • Heart disease Mother    • Stroke Mother    • Diabetes Brother        Review of Systems  Constitutional: No wt loss, fever, fatigue  Gastrointestinal: No nausea, abdominal pain  Behavioral/Psych: No insomnia or anxiety   Cardiovascular chest pain  Objective:       Physical Exam  /88   Pulse 76   Ht 165.1 cm (65\")   Wt 76.2 kg (168 lb)   BMI 27.96 kg/m²   General appearance: No acute changes   Neck: Trachea midline; NECK, supple, no thyromegaly or lymphadenopathy   Lungs: Normal size and shape, normal breath sounds, equal distribution of air, no rales and rhonchi   CV: S1-S2 regular, no murmurs, no rub, no gallop   Abdomen: Soft, non-tender; no masses , no abnormal abdominal sounds   Extremities: No deformity , normal color , no peripheral edema   Skin: Normal temperature, turgor and texture; no rash, ulcers            ECG 12 Lead    Date/Time: 1/7/2021 2:22 PM  Performed by: Abdi Ramesh MD  Authorized by: Abdi Ramesh MD   Comparison: compared with previous ECG   Similar to previous ECG  Rhythm: atrial fibrillation  ST Flattening: all    Clinical impression: abnormal EKG              Echocardiogram:        Current Outpatient Medications:   •  amLODIPine (NORVASC) 10 MG tablet, Take 5 mg by mouth Daily. TAKE 1/2 TABLET DAILY, Disp: , Rfl:   •  aspirin 81 MG chewable tablet, Chew 81 mg Daily., Disp: , Rfl:   •  B Complex-Biotin-FA (VITAMIN B50 COMPLEX PO), Take  by mouth., Disp: , Rfl:   •  bisacodyl (DULCOLAX) 5 MG EC tablet, Take 1 tablet by mouth Daily., Disp: , Rfl:   •  Ferrous Sulfate (IRON) 325 (65 Fe) MG " tablet, Take 1 tablet by mouth Daily., Disp: , Rfl:   •  furosemide (LASIX) 20 MG tablet, Take 1 tablet by mouth Daily., Disp: 90 tablet, Rfl: 3  •  levothyroxine (SYNTHROID, LEVOTHROID) 50 MCG tablet, Take 50 mcg by mouth Daily., Disp: , Rfl:   •  metoprolol succinate XL (TOPROL-XL) 50 MG 24 hr tablet, TAKE 1 TABLET BY MOUTH EVERY DAY, Disp: 90 tablet, Rfl: 0  •  Multiple Vitamins-Minerals (CENTRUM SILVER 50+WOMEN) tablet, Take 1 tablet by mouth Daily., Disp: , Rfl:   •  omeprazole (priLOSEC) 40 MG capsule, , Disp: , Rfl:   •  rosuvastatin (CRESTOR) 5 MG tablet, Take 5 mg by mouth Daily., Disp: , Rfl:   •  tiZANidine (ZANAFLEX) 4 MG tablet, , Disp: , Rfl:   •  traMADol (ULTRAM) 50 MG tablet, 50 mg., Disp: , Rfl:   •  valsartan (DIOVAN) 160 MG tablet, Take 320 mg by mouth Daily. 1 tablet daily, Disp: , Rfl:    Assessment:        Patient Active Problem List   Diagnosis   • Persistent atrial fibrillation (CMS/HCC)   • Leg swelling   • Congestive heart failure (CMS/HCC)   • Essential hypertension   • Anticoagulated   • Abnormal cardiac function test   • Coronary artery disease involving native heart without angina pectoris   • Fatigue due to excessive exertion   • Abnormal EKG   • Spondylolisthesis of lumbar region   • Lumbar spinal stenosis   • Hyperlipidemia               Plan:            ICD-10-CM ICD-9-CM   1. Presence of Watchman left atrial appendage closure device  Z95.818 V45.09   2. Hyperlipidemia, unspecified hyperlipidemia type  E78.5 272.4   3. Anticoagulated  Z79.01 V58.61   4. Abnormal cardiac function test  R94.30 794.30     1. Presence of Watchman left atrial appendage closure device    - Adult Transthoracic Echo Complete W/ Cont if Necessary Per Protocol; Future    2. Hyperlipidemia, unspecified hyperlipidemia type  Continue current treatment  - Adult Transthoracic Echo Complete W/ Cont if Necessary Per Protocol; Future    3. Anticoagulated  Patient not on anticoagulation because of watchman  procedure  - Adult Transthoracic Echo Complete W/ Cont if Necessary Per Protocol; Future    4. Abnormal cardiac function test  Considering patient's medical condition as well as the risk factors, patient will require echocardiogram for further evaluation for the LV function, four-chamber evaluation, including the pressures, valvular function and  pericardial disease and pericardial effusion    - Adult Transthoracic Echo Complete W/ Cont if Necessary Per Protocol; Future    5. Abnormal electrocardiogram (ECG) (EKG)     - Adult Transthoracic Echo Complete W/ Cont if Necessary Per Protocol; Future    6. Persistent atrial fibrillation (CMS/HCC)  Rate under control      1 yr with echo  COUNSELING:    Tatyana GoldtCounseling was given to patient for the following topics: diagnostic results, risk factor reductions, impressions, risks and benefits of treatment options and importance of treatment compliance .       SMOKING COUNSELING:    [unfilled]    Dictated using Dragon dictation

## 2021-01-08 PROBLEM — Z79.01 ANTICOAGULATED: Status: RESOLVED | Noted: 2017-10-12 | Resolved: 2021-01-08

## 2021-01-26 ENCOUNTER — HOSPITAL ENCOUNTER (OUTPATIENT)
Dept: GENERAL RADIOLOGY | Facility: HOSPITAL | Age: 71
Discharge: HOME | End: 2021-01-26

## 2021-01-26 ENCOUNTER — ANESTHESIA (OUTPATIENT)
Dept: PAIN MEDICINE | Facility: HOSPITAL | Age: 71
End: 2021-01-26

## 2021-01-26 ENCOUNTER — ANESTHESIA EVENT (OUTPATIENT)
Dept: PAIN MEDICINE | Facility: HOSPITAL | Age: 71
End: 2021-01-26

## 2021-01-26 ENCOUNTER — HOSPITAL ENCOUNTER (OUTPATIENT)
Dept: PAIN MEDICINE | Facility: HOSPITAL | Age: 71
Discharge: HOME | End: 2021-01-26

## 2021-01-26 VITALS
RESPIRATION RATE: 16 BRPM | OXYGEN SATURATION: 97 % | HEART RATE: 69 BPM | SYSTOLIC BLOOD PRESSURE: 163 MMHG | TEMPERATURE: 97.5 F | DIASTOLIC BLOOD PRESSURE: 68 MMHG

## 2021-01-26 DIAGNOSIS — R52 PAIN: ICD-10-CM

## 2021-01-26 DIAGNOSIS — M54.16 LUMBAR NEURITIS: Primary | ICD-10-CM

## 2021-01-26 PROCEDURE — 0 IOPAMIDOL 41 % SOLUTION: Performed by: ANESTHESIOLOGY

## 2021-01-26 PROCEDURE — C1755 CATHETER, INTRASPINAL: HCPCS

## 2021-01-26 PROCEDURE — 77003 FLUOROGUIDE FOR SPINE INJECT: CPT

## 2021-01-26 PROCEDURE — 25010000002 METHYLPREDNISOLONE PER 80 MG: Performed by: ANESTHESIOLOGY

## 2021-01-26 RX ORDER — MIDAZOLAM HYDROCHLORIDE 1 MG/ML
1 INJECTION INTRAMUSCULAR; INTRAVENOUS AS NEEDED
Status: DISCONTINUED | OUTPATIENT
Start: 2021-01-26 | End: 2021-01-27 | Stop reason: HOSPADM

## 2021-01-26 RX ORDER — SODIUM CHLORIDE 0.9 % (FLUSH) 0.9 %
1-10 SYRINGE (ML) INJECTION AS NEEDED
Status: DISCONTINUED | OUTPATIENT
Start: 2021-01-26 | End: 2021-01-27 | Stop reason: HOSPADM

## 2021-01-26 RX ORDER — FENTANYL CITRATE 50 UG/ML
50 INJECTION, SOLUTION INTRAMUSCULAR; INTRAVENOUS AS NEEDED
Status: DISCONTINUED | OUTPATIENT
Start: 2021-01-26 | End: 2021-01-27 | Stop reason: HOSPADM

## 2021-01-26 RX ORDER — METHYLPREDNISOLONE ACETATE 80 MG/ML
80 INJECTION, SUSPENSION INTRA-ARTICULAR; INTRALESIONAL; INTRAMUSCULAR; SOFT TISSUE ONCE
Status: COMPLETED | OUTPATIENT
Start: 2021-01-26 | End: 2021-01-26

## 2021-01-26 RX ORDER — LIDOCAINE HYDROCHLORIDE 10 MG/ML
1 INJECTION, SOLUTION INFILTRATION; PERINEURAL ONCE AS NEEDED
Status: DISCONTINUED | OUTPATIENT
Start: 2021-01-26 | End: 2021-01-27 | Stop reason: HOSPADM

## 2021-01-26 RX ADMIN — IOPAMIDOL 10 ML: 408 INJECTION, SOLUTION INTRATHECAL at 10:24

## 2021-01-26 RX ADMIN — METHYLPREDNISOLONE ACETATE 80 MG: 80 INJECTION, SUSPENSION INTRA-ARTICULAR; INTRALESIONAL; INTRAMUSCULAR; SOFT TISSUE at 10:25

## 2021-01-26 NOTE — ANESTHESIA PROCEDURE NOTES
PAIN Epidural block    Pre-sedation assessment completed: 1/26/2021 10:20 AM    Patient reassessed immediately prior to procedure    Patient location during procedure: pain clinic  Start Time: 1/26/2021 10:20 AM  Stop Time: 1/26/2021 10:26 AM  Indication:at surgeon's request and procedure for pain  Performed By  Anesthesiologist: Jeffrey Thomason MD  Preanesthetic Checklist  Completed: patient identified, surgical consent, pre-op evaluation, timeout performed, risks and benefits discussed and monitors and equipment checked  Additional Notes  Post-Op Diagnosis Codes:     * Lumbar neuritis (M54.16)     * Spinal stenosis of lumbar region without neurogenic claudication (M48.061)     * Lumbago (M54.5)     * Spondylolisthesis at L4-L5 level (M43.16)    Prep:  Pt Position:prone  Sterile Tech:sterile barrier, mask and gloves  Prep:chlorhexidine gluconate and isopropyl alcohol  Monitoring:blood pressure monitoring, continuous pulse oximetry and EKG  Procedure:Sedation: no     Approach:right paramedian  Guidance: fluoroscopy  Location:lumbar  Level:4-5  Needle Gauge:20 G  Aspiration:negative  Test Dose:negative  Medications:  Depomedrol:80 mg  Preservative Free Saline:2mL  Isovue:2mL    Post Assessment:  Pt Tolerance:patient tolerated the procedure well with no apparent complications  Complications:no

## 2021-01-26 NOTE — H&P
[facility]    History and Physical    Patient Name: Tatyana Reyes  :  1950  MRN:  [id number]  Date of Admission: 2021    Subjective     Patient is a 70 y.o. female presents with chief complaint of chronic low back, hips: bilateral and buttock pain.  Onset of symptoms was gradual starting several months ago.  Symptoms are associated/aggravated by nothing in particular or activity. Symptoms improve with injection    The following portions of the patients history were reviewed and updated as appropriate: current medications, allergies, past medical history, past surgical history, past family history, past social history and problem list    She reports fairly longstanding low back pain that radiates into both of her lower extremities.  The pain is in the right worse than the left.  She has difficulty describing whether goes down the front to the back of the leg she says the whole leg seems to hurt but the right side is worse than the left.  She has an MRI from 2019 which shows L4-5 spondylolisthesis with moderate to severe stenosis as well as L5-S1 disc protrusion.  She had a previous epidural steroid injection which afforded her about 95% relief of her pain.          Objective     Past Medical History:   Past Medical History:   Diagnosis Date   • Atrial fibrillation (CMS/[facility])    • Atrial fibrillation (CMS/[facility])    • CHF (congestive heart failure) (CMS/[facility])    • Chronic kidney disease    • Disease of thyroid gland    • DVT (deep venous thrombosis) (CMS/[facility]) 2017    BILAT LE S/P SHOULDER REPLACEMENT   • Dyspnea    • Hypertension    • Low back pain      Past Surgical History:   Past Surgical History:   Procedure Laterality Date   • BLADDER REPAIR     • CARDIAC CATHETERIZATION N/A 2018    Procedure: Left Heart Cath;  Surgeon: Abdi Ramesh MD;  Location: [facility] INVASIVE LOCATION;  Service:    • CARDIAC CATHETERIZATION N/A 2018    Procedure: Coronary angiography;  Surgeon:  Abdi Ramesh MD;  Location:  [facility];  Service:    • CARDIAC CATHETERIZATION N/A 1/5/2018    Procedure: Left ventriculography;  Surgeon: Abdi Ramesh MD;  Location:  [facility] INV[facility] LOC[facility];  Service:    • CARDIAC CATHETERIZATION N/A 1/5/2018    Procedure: Stent BMS coronary;  Surgeon: Abdi Ramesh MD;  Location:  [facility];  Service:    • EPIDURAL BLOCK     • PARATHYROID GLAND SURGERY     • [place] RT/LT HEART CATHETERS N/A 1/5/2018    Procedure: Percutaneous Coronary Intervention;  Surgeon: Abdi Ramesh MD;  Location:  [facility]VAS[facility] LOC[facility];  Service: Cardiovascular   • TONSILLECTOMY     • TOTAL SHOULDER REPLACEMENT Left 09/08/2017   • TUBAL ABDOMINAL LIGATION       Family History:   Family History   Problem Relation Age of Onset   • Heart disease Mother    • Stroke Mother    • Diabetes Brother      Social History:   Social History     Tobacco Use   • Smoking status: Never Smoker   • Smokeless tobacco: Never Used   Substance Use Topics   • Alcohol use: No   • Drug use: No       Vital Signs Range for the last 24 hours  Temperature: Temp:  [36.4 °C (97.5 °F)] 36.4 °C (97.5 °F)   Temp Source: Temp src: Infrared   BP: BP: (190)/(107) 190/107   Pulse: Heart Rate:  [74] 74   Respirations: Resp:  [16] 16   SPO2: SpO2:  [99 %] 99 %   O2 Amount (l/min):     O2 Devices Device (Oxygen Therapy): room air   Weight:           --------------------------------------------------------------------------------    Current Outpatient Medications   Medication Sig Dispense Refill   • amLODIPine (NORVASC) 10 MG tablet Take 5 mg by mouth Daily. TAKE 1/2 TABLET DAILY     • B Complex-Biotin-FA (VITAMIN B50 COMPLEX PO) Take  by mouth.     • bisacodyl (DULCOLAX) 5 MG EC tablet Take 1 tablet by mouth Daily.     • Ferrous Sulfate (IRON) 325 (65 Fe) MG tablet Take 1 tablet by mouth Daily.     • furosemide (LASIX) 20 MG tablet Take 1 tablet by mouth Daily. 90  tablet 3   • levothyroxine (SYNTHROID, LEVOTHROID) 50 MCG tablet Take 50 mcg by mouth Daily.     • metoprolol succinate XL (TOPROL-XL) 50 MG 24 hr tablet TAKE 1 TABLET BY MOUTH EVERY DAY 90 tablet 0   • Multiple Vitamins-Minerals (CENTRUM SILVER 50+WOMEN) tablet Take 1 tablet by mouth Daily.     • omeprazole (priLOSEC) 40 MG capsule      • rosuvastatin (CRESTOR) 5 MG tablet Take 5 mg by mouth Daily.     • tiZANidine (ZANAFLEX) 4 MG tablet      • traMADol (ULTRAM) 50 MG tablet 50 mg.     • valsartan (DIOVAN) 160 MG tablet Take 320 mg by mouth Daily. 1 tablet daily     • aspirin 81 MG chewable tablet Chew 81 mg Daily.       No current facility-administered medications for this encounter.        --------------------------------------------------------------------------------  Assessment/Plan      Anesthesia Evaluation           Pain impairs ability to perform ADLs: Ambulation       Airway   Mallampati: II  TM distance: >3 FB  Neck ROM: full  No difficulty expected  Dental - normal exam     Pulmonary - normal exam   (+) shortness of breath,   Cardiovascular     Rhythm: irregular    (+) hypertension, CAD, dysrhythmias Atrial Fib, CHF , hyperlipidemia,     ROS comment: She is watchman device deployed to occlude left atrial appendage.  She is no longer on anticoagulants other than low-dose aspirin    Neuro/Psych  (+) numbness,   left straight leg raise test,   right straight leg raise test,    GI/Hepatic/Renal/Endo    (+)   renal disease,     ROS Comment: GFR is 49    Musculoskeletal (-) normal exam    Abdominal  - normal exam   Substance History      OB/GYN          Other                   Diagnosis and Plan    Treatment Plan  ASA 3   Patient has had previous injection/procedure with % improvement.   Procedures: Lumbar Epidural Steroid Injection(LESI), With fluoroscopy,           Discussed with patient risk and benefits of TIA including but not limited to : Bleeding, infection, PDPH, inadvertant spinal anesthetic,  "worsening pain, hyperglycemia, hypertension, CHF, nerve damage, steroid \"toxicity\" and AVN of hips.  Pt agrees to proceed.    Diagnosis     * Lumbar neuritis [M54.16]     * Spinal stenosis of lumbar region without neurogenic claudication [M48.061]     * Lumbago [M54.5]     * Spondylolisthesis at L4-L5 level [M43.16]                    "

## 2021-03-09 DIAGNOSIS — Z23 IMMUNIZATION DUE: ICD-10-CM

## 2021-08-04 ENCOUNTER — TELEPHONE (OUTPATIENT)
Dept: NEUROSURGERY | Facility: CLINIC | Age: 71
End: 2021-08-04

## 2021-08-04 DIAGNOSIS — M43.16 SPONDYLOLISTHESIS OF LUMBAR REGION: Primary | ICD-10-CM

## 2021-08-04 NOTE — TELEPHONE ENCOUNTER
Caller: Tatyana Reyes    Relationship: Self    Best call back number: [phone]    What was the call regarding:     PATIENT'S MEDICARE YEAR IS ENDING TOMORROW, HER PAIN MANAGEMENT STATES THAT SHE NEEDS A NEW REFERRAL IN ORDER TO RECEIVE ANOTHER EPIDURAL BLOCK.     PATIENT WOULD LIKE TO KNOW IF DR. LEDESMA COULD SEND A NEW REFERRAL.       Do you require a callback:   PLEASE CALL PATIENT BACK TO LET HER KNOW ABOUT THE REFERRAL TO PAIN MANAGEMENT -[phone].

## 2021-08-06 NOTE — TELEPHONE ENCOUNTER
S/YAA sage and informed her that she has a new referral for TIA injections and someone will call her to schedule her appointment

## 2021-08-23 ENCOUNTER — HOSPITAL ENCOUNTER (OUTPATIENT)
Dept: PAIN MEDICINE | Facility: HOSPITAL | Age: 71
Discharge: HOME | End: 2021-08-23

## 2021-08-23 ENCOUNTER — ANESTHESIA EVENT (OUTPATIENT)
Dept: PAIN MEDICINE | Facility: HOSPITAL | Age: 71
End: 2021-08-23

## 2021-08-23 ENCOUNTER — HOSPITAL ENCOUNTER (OUTPATIENT)
Dept: GENERAL RADIOLOGY | Facility: HOSPITAL | Age: 71
Discharge: HOME | End: 2021-08-23

## 2021-08-23 ENCOUNTER — ANESTHESIA (OUTPATIENT)
Dept: PAIN MEDICINE | Facility: HOSPITAL | Age: 71
End: 2021-08-23

## 2021-08-23 VITALS
RESPIRATION RATE: 16 BRPM | HEART RATE: 71 BPM | BODY MASS INDEX: 27.49 KG/M2 | TEMPERATURE: 97.5 F | HEIGHT: 65 IN | SYSTOLIC BLOOD PRESSURE: 177 MMHG | DIASTOLIC BLOOD PRESSURE: 87 MMHG | OXYGEN SATURATION: 95 % | WEIGHT: 165 LBS

## 2021-08-23 DIAGNOSIS — M48.062 SPINAL STENOSIS OF LUMBAR REGION WITH NEUROGENIC CLAUDICATION: Primary | ICD-10-CM

## 2021-08-23 DIAGNOSIS — M54.50 LUMBAR PAIN: ICD-10-CM

## 2021-08-23 PROCEDURE — 25010000002 METHYLPREDNISOLONE PER 80 MG: Performed by: ANESTHESIOLOGY

## 2021-08-23 PROCEDURE — 77003 FLUOROGUIDE FOR SPINE INJECT: CPT

## 2021-08-23 PROCEDURE — 0 IOPAMIDOL 41 % SOLUTION: Performed by: ANESTHESIOLOGY

## 2021-08-23 RX ORDER — LIDOCAINE HYDROCHLORIDE 10 MG/ML
1 INJECTION, SOLUTION INFILTRATION; PERINEURAL ONCE
Status: DISCONTINUED | OUTPATIENT
Start: 2021-08-23 | End: 2021-08-24 | Stop reason: HOSPADM

## 2021-08-23 RX ORDER — FENTANYL CITRATE 50 UG/ML
50 INJECTION, SOLUTION INTRAMUSCULAR; INTRAVENOUS AS NEEDED
Status: DISCONTINUED | OUTPATIENT
Start: 2021-08-23 | End: 2021-08-24 | Stop reason: HOSPADM

## 2021-08-23 RX ORDER — METHYLPREDNISOLONE ACETATE 80 MG/ML
80 INJECTION, SUSPENSION INTRA-ARTICULAR; INTRALESIONAL; INTRAMUSCULAR; SOFT TISSUE ONCE
Status: COMPLETED | OUTPATIENT
Start: 2021-08-23 | End: 2021-08-23

## 2021-08-23 RX ORDER — MIDAZOLAM HYDROCHLORIDE 1 MG/ML
1 INJECTION INTRAMUSCULAR; INTRAVENOUS
Status: DISCONTINUED | OUTPATIENT
Start: 2021-08-23 | End: 2021-08-24 | Stop reason: HOSPADM

## 2021-08-23 RX ADMIN — METHYLPREDNISOLONE ACETATE 80 MG: 80 INJECTION, SUSPENSION INTRA-ARTICULAR; INTRALESIONAL; INTRAMUSCULAR; SOFT TISSUE at 13:15

## 2021-08-23 RX ADMIN — IOPAMIDOL 10 ML: 408 INJECTION, SOLUTION INTRATHECAL at 13:14

## 2021-08-23 NOTE — ANESTHESIA PROCEDURE NOTES
PAIN Epidural block      Patient reassessed immediately prior to procedure    Patient location during procedure: pain clinic  Indication:procedure for pain  Performed By  Anesthesiologist: Bernabe León MD  Preanesthetic Checklist  Completed: patient identified, site marked, risks and benefits discussed, surgical consent, monitors and equipment checked, pre-op evaluation and timeout performed  Additional Notes  Depomedrol - 80mg    Needle position confirmed by fluoroscopy and epidurogram using [id number].    Diagnosis  Post-Op Diagnosis Codes:     * Lumbar degenerative disc disease (M51.36)     * Lumbar radiculopathy (M54.16)     * Lumbar spinal stenosis (M48.061)    Prep:  Pt Position:prone  Sterile Tech:cap, gloves, mask and sterile barrier  Prep:chlorhexidine gluconate and isopropyl alcohol  Monitoring:blood pressure monitoring, continuous pulse oximetry and EKG  Procedure:Sedation: no     Approach:right paramedian  Guidance: fluoroscopy  Location:lumbar  Level:4-5  Needle Type:Tuohy  Needle Gauge:20  Aspiration:negative  Medications:  Depomedrol:80 mg  Preservative Free Saline:2mL  Isovue:0 (0.5cc)mL    Post Assessment:  Post-procedure: bandaide.  Pt Tolerance:patient tolerated the procedure well with no apparent complications  Complications:no

## 2021-08-23 NOTE — H&P
[facility]    History and Physical    Patient Name: Tatyana Reyes  :  1950  MRN:  [id number]  Date of Admission: 2021    Subjective     Patient is a 71 y.o. female presents with chief complaint of chronic, intermitent, moderate low back and leg: right pain.  Onset of symptoms was gradual starting several years ago.  Symptoms are associated/aggravated by activity. Symptoms improve with rest    The following portions of the patients history were reviewed and updated as appropriate: current medications, allergies, past medical history, past surgical history, past family history, past social history and problem list                Objective     Past Medical History:   Past Medical History:   Diagnosis Date   • Atrial fibrillation (CMS/[facility])    • Atrial fibrillation (CMS/[facility])    • CHF (congestive heart failure) (CMS/[facility])    • Chronic kidney disease    • Disease of thyroid gland    • DVT (deep venous thrombosis) (CMS/[facility]) 2017    BILAT LE S/P SHOULDER REPLACEMENT   • Dyspnea    • GERD (gastroesophageal reflux disease)    • Hypertension    • Low back pain    • Osteoarthritis      Past Surgical History:   Past Surgical History:   Procedure Laterality Date   • BLADDER REPAIR     • CARDIAC CATHETERIZATION N/A 2018    Procedure: Left Heart Cath;  Surgeon: Abdi Ramesh MD;  Location: [facility] INVASIVE LOCATION;  Service:    • CARDIAC CATHETERIZATION N/A 2018    Procedure: Coronary angiography;  Surgeon: Abdi Ramesh MD;  Location: [facility] INVASIVE LOCATION;  Service:    • CARDIAC CATHETERIZATION N/A 2018    Procedure: Left ventriculography;  Surgeon: Abdi Ramesh MD;  Location: [facility] CATH INVASIVE LOCATION;  Service:    • CARDIAC CATHETERIZATION N/A 2018    Procedure: Stent BMS coronary;  Surgeon: Abdi Ramesh MD;  Location: [facility] CATH INVASIVE LOCATION;  Service:    • EPIDURAL BLOCK     • PARATHYROID GLAND SURGERY     • DC RT/LT HEART  "CATHETERS N/A 1/5/2018    Procedure: Percutaneous Coronary Intervention;  Surgeon: Abdi Ramesh MD;  Location: [facility] INVASIVE LOCATION;  Service: Cardiovascular   • TONSILLECTOMY     • TOTAL SHOULDER REPLACEMENT Left 09/08/2017   • TUBAL ABDOMINAL LIGATION       Family History:   Family History   Problem Relation Age of Onset   • Heart disease Mother    • Stroke Mother    • Diabetes Brother      Social History:   Social History     Socioeconomic History   • Marital status:      Spouse name: Not on file   • Number of children: 1   • Years of education: 12   • Highest education level: High school graduate   Tobacco Use   • Smoking status: Never Smoker   • Smokeless tobacco: Never Used   Substance and Sexual Activity   • Alcohol use: No   • Drug use: No   • Sexual activity: Defer       Vital Signs Range for the last 24 hours  Temperature: Temp:  [36.4 °C (97.5 °F)] 36.4 °C (97.5 °F)   Temp Source: Temp src: Infrared   BP: BP: (177)/(87) 177/87   Pulse: Heart Rate:  [80] 80   Respirations: Resp:  [14] 14   SPO2: SpO2:  [98 %] 98 %   O2 Amount (l/min):     O2 Devices Device (Oxygen Therapy): room air   Weight: Weight:  [74.8 kg (165 lb)] 74.8 kg (165 lb)     Flowsheet Rows      First Filed Value   Admission Height  165.1 cm (65\") Documented at 08/23/2021 1256   Admission Weight  74.8 kg (165 lb) Documented at 08/23/2021 1256          --------------------------------------------------------------------------------    Current Outpatient Medications   Medication Sig Dispense Refill   • amLODIPine (NORVASC) 10 MG tablet Take 5 mg by mouth Daily. TAKE 1/2 TABLET DAILY     • B Complex-Biotin-FA (VITAMIN B50 COMPLEX PO) Take  by mouth.     • bisacodyl (DULCOLAX) 5 MG EC tablet Take 1 tablet by mouth Daily.     • Ferrous Sulfate (IRON) 325 (65 Fe) MG tablet Take 1 tablet by mouth Daily.     • furosemide (LASIX) 20 MG tablet Take 1 tablet by mouth Daily. 90 tablet 3   • levothyroxine (SYNTHROID, LEVOTHROID) " 50 MCG tablet Take 50 mcg by mouth Daily.     • metoprolol succinate XL (TOPROL-XL) 50 MG 24 hr tablet TAKE 1 TABLET BY MOUTH EVERY DAY 90 tablet 0   • Multiple Vitamins-Minerals (CENTRUM SILVER 50+WOMEN) tablet Take 1 tablet by mouth Daily.     • omeprazole (priLOSEC) 40 MG capsule      • rosuvastatin (CRESTOR) 5 MG tablet Take 5 mg by mouth Daily.     • tiZANidine (ZANAFLEX) 4 MG tablet      • valsartan (DIOVAN) 160 MG tablet Take 320 mg by mouth Daily. 1 tablet daily     • aspirin 81 MG chewable tablet Chew 81 mg Daily.     • traMADol (ULTRAM) 50 MG tablet 50 mg.       No current facility-administered medications for this encounter.       --------------------------------------------------------------------------------  Assessment/Plan      Anesthesia Evaluation     Patient summary reviewed and Nursing notes reviewed   NPO Solid Status: > 8 hours  NPO Liquid Status: > 2 hours    Pain impairs ability to perform ADLs: Sleeping  Modalities previously tried to control pain with limited effectiveness within the last 4-6 weeks: Rest     Airway   Mallampati: II  TM distance: >3 FB  Neck ROM: full  Dental - normal exam     Pulmonary - normal exam   (+) shortness of breath,   Cardiovascular - normal exam    (+) hypertension, CAD, dysrhythmias Atrial Fib, CHF , DVT, hyperlipidemia,       Neuro/Psych- negative ROS and neuro exam normal  GI/Hepatic/Renal/Endo    (+)  GERD,  renal disease,     Musculoskeletal (-) normal exam    (+) back pain, radiculopathy  Abdominal  - normal exam   Substance History - negative use     OB/GYN negative ob/gyn ROS         Other   arthritis,                 Diagnosis and Plan    Treatment Plan  ASA 3      Procedures: Lumbar Epidural Steroid Injection(LESI), With fluoroscopy,       Anesthetic plan and risks discussed with patient.      Target L4-5    Diagnosis     * Lumbar degenerative disc disease [M51.36]     * Lumbar radiculopathy [M54.16]     * Lumbar spinal stenosis [M48.061]

## 2022-01-17 ENCOUNTER — OFFICE VISIT (OUTPATIENT)
Dept: CARDIOLOGY | Facility: CLINIC | Age: 72
End: 2022-01-17

## 2022-01-17 ENCOUNTER — HOSPITAL ENCOUNTER (OUTPATIENT)
Dept: CARDIOLOGY | Facility: HOSPITAL | Age: 72
Discharge: HOME | End: 2022-01-17
Admitting: INTERNAL MEDICINE

## 2022-01-17 VITALS
SYSTOLIC BLOOD PRESSURE: 158 MMHG | WEIGHT: 165 LBS | HEART RATE: 78 BPM | BODY MASS INDEX: 27.49 KG/M2 | DIASTOLIC BLOOD PRESSURE: 82 MMHG | HEIGHT: 65 IN

## 2022-01-17 VITALS
WEIGHT: 165 LBS | HEART RATE: 59 BPM | DIASTOLIC BLOOD PRESSURE: 72 MMHG | SYSTOLIC BLOOD PRESSURE: 158 MMHG | HEIGHT: 65 IN | BODY MASS INDEX: 27.49 KG/M2

## 2022-01-17 DIAGNOSIS — Z95.818 PRESENCE OF WATCHMAN LEFT ATRIAL APPENDAGE CLOSURE DEVICE: ICD-10-CM

## 2022-01-17 DIAGNOSIS — R94.30 ABNORMAL CARDIAC FUNCTION TEST: ICD-10-CM

## 2022-01-17 DIAGNOSIS — I25.10 CORONARY ARTERY DISEASE INVOLVING NATIVE CORONARY ARTERY OF NATIVE HEART WITHOUT ANGINA PECTORIS: Primary | ICD-10-CM

## 2022-01-17 DIAGNOSIS — R94.31 ABNORMAL ELECTROCARDIOGRAM (ECG) (EKG): ICD-10-CM

## 2022-01-17 DIAGNOSIS — I10 ESSENTIAL HYPERTENSION: ICD-10-CM

## 2022-01-17 DIAGNOSIS — I48.19 PERSISTENT ATRIAL FIBRILLATION: ICD-10-CM

## 2022-01-17 DIAGNOSIS — E78.5 HYPERLIPIDEMIA, UNSPECIFIED HYPERLIPIDEMIA TYPE: ICD-10-CM

## 2022-01-17 DIAGNOSIS — Z79.01 ANTICOAGULATED: ICD-10-CM

## 2022-01-17 LAB
ASCENDING AORTA: 2.5 CM
BH CV ECHO MEAS - ACS: 1.7 CM
BH CV ECHO MEAS - AO MAX PG (FULL): 8.3 MMHG
BH CV ECHO MEAS - AO MAX PG: 10.3 MMHG
BH CV ECHO MEAS - AO MEAN PG (FULL): 4.3 MMHG
BH CV ECHO MEAS - AO MEAN PG: 5.6 MMHG
BH CV ECHO MEAS - AO ROOT AREA (BSA CORRECTED): 1.2
BH CV ECHO MEAS - AO ROOT AREA: 3.5 CM^2
BH CV ECHO MEAS - AO ROOT DIAM: 2.1 CM
BH CV ECHO MEAS - AO V2 MAX: 160.5 CM/SEC
BH CV ECHO MEAS - AO V2 MEAN: 110.4 CM/SEC
BH CV ECHO MEAS - AO V2 VTI: 36.2 CM
BH CV ECHO MEAS - ASC AORTA: 2.3 CM
BH CV ECHO MEAS - AVA(I,A): 1.7 CM^2
BH CV ECHO MEAS - AVA(I,D): 1.7 CM^2
BH CV ECHO MEAS - AVA(V,A): 1.6 CM^2
BH CV ECHO MEAS - AVA(V,D): 1.6 CM^2
BH CV ECHO MEAS - BSA(HAYCOCK): 1.9 M^2
BH CV ECHO MEAS - BSA: 1.8 M^2
BH CV ECHO MEAS - BZI_BMI: 27.5 KILOGRAMS/M^2
BH CV ECHO MEAS - BZI_METRIC_HEIGHT: 165.1 CM
BH CV ECHO MEAS - BZI_METRIC_WEIGHT: 74.8 KG
BH CV ECHO MEAS - EDV(CUBED): 73.2 ML
BH CV ECHO MEAS - EDV(MOD-SP2): 41 ML
BH CV ECHO MEAS - EDV(MOD-SP4): 35 ML
BH CV ECHO MEAS - EDV(TEICH): 77.8 ML
BH CV ECHO MEAS - EF(CUBED): 76.6 %
BH CV ECHO MEAS - EF(MOD-BP): 63 %
BH CV ECHO MEAS - EF(MOD-SP2): 70.7 %
BH CV ECHO MEAS - EF(MOD-SP4): 60 %
BH CV ECHO MEAS - EF(TEICH): 69.1 %
BH CV ECHO MEAS - ESV(CUBED): 17.1 ML
BH CV ECHO MEAS - ESV(MOD-SP2): 12 ML
BH CV ECHO MEAS - ESV(MOD-SP4): 14 ML
BH CV ECHO MEAS - ESV(TEICH): 24.1 ML
BH CV ECHO MEAS - FS: 38.4 %
BH CV ECHO MEAS - IVS/LVPW: 1.1
BH CV ECHO MEAS - IVSD: 1.3 CM
BH CV ECHO MEAS - LA DIMENSION: 4.3 CM
BH CV ECHO MEAS - LA/AO: 2.1
BH CV ECHO MEAS - LV DIASTOLIC VOL/BSA (35-75): 19.2 ML/M^2
BH CV ECHO MEAS - LV MASS(C)D: 197.3 GRAMS
BH CV ECHO MEAS - LV MASS(C)DI: 108.2 GRAMS/M^2
BH CV ECHO MEAS - LV MAX PG: 2 MMHG
BH CV ECHO MEAS - LV MEAN PG: 1.3 MMHG
BH CV ECHO MEAS - LV SYSTOLIC VOL/BSA (12-30): 7.7 ML/M^2
BH CV ECHO MEAS - LV V1 MAX: 71 CM/SEC
BH CV ECHO MEAS - LV V1 MEAN: 53.6 CM/SEC
BH CV ECHO MEAS - LV V1 VTI: 17.5 CM
BH CV ECHO MEAS - LVIDD: 4.2 CM
BH CV ECHO MEAS - LVIDS: 2.6 CM
BH CV ECHO MEAS - LVLD AP2: 5.8 CM
BH CV ECHO MEAS - LVLD AP4: 5.5 CM
BH CV ECHO MEAS - LVLS AP2: 4.8 CM
BH CV ECHO MEAS - LVLS AP4: 4 CM
BH CV ECHO MEAS - LVOT AREA (M): 3.5 CM^2
BH CV ECHO MEAS - LVOT AREA: 3.5 CM^2
BH CV ECHO MEAS - LVOT DIAM: 2.1 CM
BH CV ECHO MEAS - LVPWD: 1.2 CM
BH CV ECHO MEAS - MR MAX PG: 93.7 MMHG
BH CV ECHO MEAS - MR MAX VEL: 483.9 CM/SEC
BH CV ECHO MEAS - MV DEC SLOPE: 528.4 CM/SEC^2
BH CV ECHO MEAS - MV DEC TIME: 177 SEC
BH CV ECHO MEAS - MV E MAX VEL: 127.9 CM/SEC
BH CV ECHO MEAS - MV MAX PG: 9 MMHG
BH CV ECHO MEAS - MV MEAN PG: 2.8 MMHG
BH CV ECHO MEAS - MV P1/2T MAX VEL: 155.6 CM/SEC
BH CV ECHO MEAS - MV P1/2T: 86.3 MSEC
BH CV ECHO MEAS - MV V2 MAX: 149.6 CM/SEC
BH CV ECHO MEAS - MV V2 MEAN: 73 CM/SEC
BH CV ECHO MEAS - MV V2 VTI: 28.9 CM
BH CV ECHO MEAS - MVA P1/2T LCG: 1.4 CM^2
BH CV ECHO MEAS - MVA(P1/2T): 2.6 CM^2
BH CV ECHO MEAS - MVA(VTI): 2.1 CM^2
BH CV ECHO MEAS - PA ACC TIME: 0.15 SEC
BH CV ECHO MEAS - PA MAX PG (FULL): 2.3 MMHG
BH CV ECHO MEAS - PA MAX PG: 4.5 MMHG
BH CV ECHO MEAS - PA PR(ACCEL): 13.6 MMHG
BH CV ECHO MEAS - PA V2 MAX: 105.9 CM/SEC
BH CV ECHO MEAS - PVA(V,A): 4.3 CM^2
BH CV ECHO MEAS - PVA(V,D): 4.3 CM^2
BH CV ECHO MEAS - QP/QS: 1.7
BH CV ECHO MEAS - RAP SYSTOLE: 3 MMHG
BH CV ECHO MEAS - RV MAX PG: 2.1 MMHG
BH CV ECHO MEAS - RV MEAN PG: 1.1 MMHG
BH CV ECHO MEAS - RV V1 MAX: 73.2 CM/SEC
BH CV ECHO MEAS - RV V1 MEAN: 49.2 CM/SEC
BH CV ECHO MEAS - RV V1 VTI: 16.4 CM
BH CV ECHO MEAS - RVDD: 3 CM
BH CV ECHO MEAS - RVOT AREA: 6.2 CM^2
BH CV ECHO MEAS - RVOT DIAM: 2.8 CM
BH CV ECHO MEAS - RVSP: 43 MMHG
BH CV ECHO MEAS - SI(AO): 69.5 ML/M^2
BH CV ECHO MEAS - SI(CUBED): 30.8 ML/M^2
BH CV ECHO MEAS - SI(LVOT): 33.8 ML/M^2
BH CV ECHO MEAS - SI(MOD-SP2): 15.9 ML/M^2
BH CV ECHO MEAS - SI(MOD-SP4): 11.5 ML/M^2
BH CV ECHO MEAS - SI(TEICH): 29.5 ML/M^2
BH CV ECHO MEAS - SV(AO): 126.7 ML
BH CV ECHO MEAS - SV(CUBED): 56.1 ML
BH CV ECHO MEAS - SV(LVOT): 61.6 ML
BH CV ECHO MEAS - SV(MOD-SP2): 29 ML
BH CV ECHO MEAS - SV(MOD-SP4): 21 ML
BH CV ECHO MEAS - SV(RVOT): 101.8 ML
BH CV ECHO MEAS - SV(TEICH): 53.8 ML
BH CV ECHO MEAS - TAPSE (>1.6): 2.3 CM
BH CV ECHO MEAS - TR MAX VEL: 316.3 CM/SEC
BH CV XLRA - RV BASE: 2.9 CM
BH CV XLRA - RV LENGTH: 5.7 CM
BH CV XLRA - RV MID: 2.6 CM
LEFT ATRIUM VOLUME INDEX: 31.5 ML/M2
SINUS: 2.3 CM
STJ: 2.3 CM

## 2022-01-17 PROCEDURE — 93000 ELECTROCARDIOGRAM COMPLETE: CPT | Performed by: INTERNAL MEDICINE

## 2022-01-17 PROCEDURE — 93306 TTE W/DOPPLER COMPLETE: CPT | Performed by: INTERNAL MEDICINE

## 2022-01-17 PROCEDURE — 93306 TTE W/DOPPLER COMPLETE: CPT

## 2022-01-17 PROCEDURE — 99214 OFFICE O/P EST MOD 30 MIN: CPT | Performed by: INTERNAL MEDICINE

## 2022-01-17 RX ORDER — TRIAMTERENE AND HYDROCHLOROTHIAZIDE 37.5; 25 MG/1; MG/1
CAPSULE ORAL
COMMUNITY

## 2022-01-17 NOTE — PROGRESS NOTES
1 yr with echo    Subjective:        Tatyana Reyes is a 71 y.o. female who here for follow up    CC  Follow-up, watchman procedure  HPI  71-year-old female with atrial fibrillation hypertension watchman procedure coronary artery disease here for the follow-up no complaints of chest pains tightness heaviness or the pressure sensation     Problems Addressed this Visit        Cardiac and Vasculature    Persistent atrial fibrillation (HCC)    Essential hypertension    Relevant Medications    triamterene-hydrochlorothiazide (DYAZIDE) 37.5-25 MG per capsule    Coronary artery disease involving native heart without angina pectoris - Primary    Hyperlipidemia      Diagnoses       Codes Comments    Coronary artery disease involving native coronary artery of native heart without angina pectoris    -  Primary ICD-10-CM: I25.10  ICD-9-CM: 414.01     Essential hypertension     ICD-10-CM: I10  ICD-9-CM: 401.9     Hyperlipidemia, unspecified hyperlipidemia type     ICD-10-CM: E78.5  ICD-9-CM: 272.4     Persistent atrial fibrillation (HCC)     ICD-10-CM: I48.19  ICD-9-CM: 427.31         .    The following portions of the patient's history were reviewed and updated as appropriate: allergies, current medications, past family history, past medical history, past social history, past surgical history and problem list.    Past Medical History:   Diagnosis Date   • Atrial fibrillation (HCC)    • Atrial fibrillation (HCC)    • CHF (congestive heart failure) (HCC)    • Chronic kidney disease    • Disease of thyroid gland    • DVT (deep venous thrombosis) (HCC) 09/08/2017    BILAT LE S/P SHOULDER REPLACEMENT   • Dyspnea    • GERD (gastroesophageal reflux disease)    • Hypertension    • Low back pain    • Osteoarthritis      reports that she has never smoked. She has never used smokeless tobacco. She reports that she does not drink alcohol and does not use drugs.   Family History   Problem Relation Age of Onset   • Heart disease Mother    •  "Stroke Mother    • Diabetes Brother        Review of Systems  Constitutional: No wt loss, fever, fatigue  Gastrointestinal: No nausea, abdominal pain  Behavioral/Psych: No insomnia or anxiety   Cardiovascular no chest pains or tightness in the chest  Objective:       Physical Exam  /82   Pulse 78   Ht 165.1 cm (65\")   Wt 74.8 kg (165 lb)   BMI 27.46 kg/m²   General appearance: No acute changes   Neck: Trachea midline; NECK, supple, no thyromegaly or lymphadenopathy   Lungs: Normal size and shape, normal breath sounds, equal distribution of air, no rales and rhonchi   CV: S1-S2 regular, no murmurs, no rub, no gallop   Abdomen: Soft, nontender; no masses , no abnormal abdominal sounds   Extremities: No deformity , normal color , no peripheral edema   Skin: Normal temperature, turgor and texture; no rash, ulcers            ECG 12 Lead    Date/Time: 1/17/2022 2:40 PM  Performed by: Abdi Ramesh MD  Authorized by: Abdi Ramesh MD   Comparison: compared with previous ECG   Similar to previous ECG  Rhythm: atrial fibrillation  ST Flattening: all    Clinical impression: abnormal EKG              Echocardiogram:        Current Outpatient Medications:   •  amLODIPine (NORVASC) 10 MG tablet, Take 5 mg by mouth Daily. TAKE 1/2 TABLET DAILY, Disp: , Rfl:   •  aspirin 81 MG chewable tablet, Chew 81 mg Daily., Disp: , Rfl:   •  B Complex-Biotin-FA (VITAMIN B50 COMPLEX PO), Take  by mouth., Disp: , Rfl:   •  bisacodyl (DULCOLAX) 5 MG EC tablet, Take 1 tablet by mouth Daily., Disp: , Rfl:   •  Cyanocobalamin ER 1000 MCG tablet controlled-release, Take  by mouth., Disp: , Rfl:   •  Ferrous Sulfate (IRON) 325 (65 Fe) MG tablet, Take 1 tablet by mouth Daily., Disp: , Rfl:   •  furosemide (LASIX) 20 MG tablet, Take 1 tablet by mouth Daily. (Patient taking differently: Take 40 mg by mouth Daily.), Disp: 90 tablet, Rfl: 3  •  levothyroxine (SYNTHROID, LEVOTHROID) 50 MCG tablet, Take 50 mcg by mouth Daily., " Disp: , Rfl:   •  metoprolol succinate XL (TOPROL-XL) 50 MG 24 hr tablet, TAKE 1 TABLET BY MOUTH EVERY DAY (Patient taking differently: 100 mg. TAKE 1 TABLET BY MOUTH EVERY DAY), Disp: 90 tablet, Rfl: 0  •  Multiple Vitamins-Minerals (CENTRUM SILVER 50+WOMEN) tablet, Take 1 tablet by mouth Daily., Disp: , Rfl:   •  omeprazole (priLOSEC) 40 MG capsule, , Disp: , Rfl:   •  rosuvastatin (CRESTOR) 5 MG tablet, Take 5 mg by mouth Daily., Disp: , Rfl:   •  tiZANidine (ZANAFLEX) 4 MG tablet, , Disp: , Rfl:   •  triamterene-hydrochlorothiazide (DYAZIDE) 37.5-25 MG per capsule, Take  by mouth., Disp: , Rfl:   •  valsartan (DIOVAN) 160 MG tablet, Take 320 mg by mouth Daily. 1 tablet daily, Disp: , Rfl:    Assessment:        Patient Active Problem List   Diagnosis   • Persistent atrial fibrillation (HCC)   • Leg swelling   • Congestive heart failure (HCC)   • Essential hypertension   • Abnormal cardiac function test   • Coronary artery disease involving native heart without angina pectoris   • Fatigue due to excessive exertion   • Abnormal EKG   • Spondylolisthesis of lumbar region   • Lumbar spinal stenosis   • Hyperlipidemia   • Presence of Watchman left atrial appendage closure device               Plan:            ICD-10-CM ICD-9-CM   1. Coronary artery disease involving native coronary artery of native heart without angina pectoris  I25.10 414.01   2. Essential hypertension  I10 401.9   3. Hyperlipidemia, unspecified hyperlipidemia type  E78.5 272.4   4. Persistent atrial fibrillation (HCC)  I48.19 427.31     1. Coronary artery disease involving native coronary artery of native heart without angina pectoris  No angina pectoris    2. Essential hypertension  Blood pressure under control    3. Hyperlipidemia, unspecified hyperlipidemia type  Continue current treatment    4. Persistent atrial fibrillation (HCC)  Under control       Afib, watchman  See in 1 yrt  COUNSELING:    Tatyana Ayala was given to patient for  the following topics: diagnostic results, risk factor reductions, impressions, risks and benefits of treatment options and importance of treatment compliance .       SMOKING COUNSELING:    [unfilled]    Dictated using Dragon dictation

## 2022-07-19 ENCOUNTER — TELEPHONE (OUTPATIENT)
Dept: CARDIOLOGY | Facility: CLINIC | Age: 72
End: 2022-07-19

## 2022-07-19 NOTE — TELEPHONE ENCOUNTER
----- Message from Augustina Avila sent at 7/13/2022  2:54 PM EDT -----  Regarding: MEDS  Contact: [phone]  PT SAW A HEMATOLOGISTS FOR BRUISING AND HE WANTS HER TO GO OFF HER ASPRIN.  SHE WANTS TO KNOW IF ITS OK.      Per Lopez MOORE Ideal we would prefer you to continue taking the ASA. Even if you just took it two times a week.     Will let patient know

## 2022-11-30 ENCOUNTER — TELEPHONE (OUTPATIENT)
Dept: ORTHOPEDIC SURGERY | Facility: CLINIC | Age: 72
End: 2022-11-30

## 2022-12-02 ENCOUNTER — OFFICE VISIT (OUTPATIENT)
Dept: ORTHOPEDIC SURGERY | Facility: CLINIC | Age: 72
End: 2022-12-02

## 2022-12-02 ENCOUNTER — LAB (OUTPATIENT)
Dept: LAB | Facility: HOSPITAL | Age: 72
End: 2022-12-02

## 2022-12-02 VITALS — BODY MASS INDEX: 27.49 KG/M2 | TEMPERATURE: 96.6 F | HEIGHT: 65 IN | WEIGHT: 165 LBS

## 2022-12-02 DIAGNOSIS — M17.0 PRIMARY OSTEOARTHRITIS OF BOTH KNEES: ICD-10-CM

## 2022-12-02 DIAGNOSIS — M17.0 PRIMARY OSTEOARTHRITIS OF BOTH KNEES: Primary | ICD-10-CM

## 2022-12-02 DIAGNOSIS — R52 PAIN: Primary | ICD-10-CM

## 2022-12-02 LAB — ERYTHROCYTE [SEDIMENTATION RATE] IN BLOOD: 12 MM/HR (ref 0–30)

## 2022-12-02 PROCEDURE — 85652 RBC SED RATE AUTOMATED: CPT

## 2022-12-02 PROCEDURE — 73562 X-RAY EXAM OF KNEE 3: CPT | Performed by: NURSE PRACTITIONER

## 2022-12-02 PROCEDURE — 36415 COLL VENOUS BLD VENIPUNCTURE: CPT

## 2022-12-02 PROCEDURE — 99214 OFFICE O/P EST MOD 30 MIN: CPT | Performed by: NURSE PRACTITIONER

## 2022-12-02 NOTE — PROGRESS NOTES
Patient: Tatyana Reyes  YOB: 1950 72 y.o. female  Medical Record Number: [id number]    Chief Complaints:   Chief Complaint   Patient presents with   • Left Knee - Initial Evaluation   • Right Knee - Initial Evaluation       History of Present Illness:Tatyana Reyes is a 72 y.o. female who presents as a new patient both myself as well as the practice with complaints of bilateral knee pain.  The patient has a rather complex history.  She has had bilateral knee pain for about 2 years.  Right knee is worse than left.  Unfortunately the patient was seen at the gel injection clinic the end of October had severe pain following her injection was placed on 2 courses of oral steroids and there was seen by Dr. Alamo on knee and was told that she had an infection in her right knee.  She was going to have a washout apparently everything looked okay at the time so they decided not to do surgery and instead she got a cortisone injection by Dr. Alamo on knee on November 17.  According to the patient after that then there was still some talk about doing a washout but they were not able to find infection so she never had surgery.  Her right knee pain has improved she still has some knee pain on her left side.  Patient denies any fever chills or unusual swelling.  We do not have any medical records to support the patient's history    Allergies: No Known Allergies    Medications:   Current Outpatient Medications   Medication Sig Dispense Refill   • amLODIPine (NORVASC) 10 MG tablet Take 5 mg by mouth Daily. TAKE 1/2 TABLET DAILY     • aspirin 81 MG chewable tablet Chew 81 mg Daily.     • B Complex-Biotin-FA (VITAMIN B50 COMPLEX PO) Take  by mouth.     • bisacodyl (DULCOLAX) 5 MG EC tablet Take 1 tablet by mouth Daily.     • Cyanocobalamin ER 1000 MCG tablet controlled-release Take  by mouth.     • Ferrous Sulfate (IRON) 325 (65 Fe) MG tablet Take 1 tablet by mouth Daily.     • furosemide (LASIX) 20 MG tablet Take 1  "tablet by mouth Daily. (Patient taking differently: Take 40 mg by mouth Daily.) 90 tablet 3   • levothyroxine (SYNTHROID, LEVOTHROID) 50 MCG tablet Take 50 mcg by mouth Daily.     • metoprolol succinate XL (TOPROL-XL) 50 MG 24 hr tablet TAKE 1 TABLET BY MOUTH EVERY DAY (Patient taking differently: 100 mg. TAKE 1 TABLET BY MOUTH EVERY DAY) 90 tablet 0   • Multiple Vitamins-Minerals (CENTRUM SILVER 50+WOMEN) tablet Take 1 tablet by mouth Daily.     • omeprazole (priLOSEC) 40 MG capsule      • rosuvastatin (CRESTOR) 5 MG tablet Take 5 mg by mouth Daily.     • tiZANidine (ZANAFLEX) 4 MG tablet      • triamterene-hydrochlorothiazide (DYAZIDE) 37.5-25 MG per capsule Take  by mouth.     • valsartan (DIOVAN) 160 MG tablet Take 320 mg by mouth Daily. 1 tablet daily       No current facility-administered medications for this visit.         The following portions of the patient's history were reviewed and updated as appropriate: allergies, current medications, past family history, past medical history, past social history, past surgical history and problem list.    Review of Systems:   A 14 point review of systems was performed. All systems negative except pertinent positives/negative listed in HPI above    Physical Exam:   Vitals:    12/02/22 1127   Temp: 96.6 °F (35.9 °C)   TempSrc: Temporal   Weight: 74.8 kg (165 lb)   Height: 165.1 cm (65\")       General: A and O x 3, ASA, NAD    SCLERA:    Normal    Skin clear although she does have multiple areas of bruising has seen hematologist before has very poor skin skin quality  Right knee patient has no appreciable effusion 116 degrees flexion neutral in extension with hip positive Doni negative Lockman calf soft and nontender       Radiology:  Xrays 3views (ap,lateral, sunrise) bilateral knees were ordered and reviewed today secondary to pain and show bone-on-bone end-stage osteoarthritis bilateral knees right greater than left no compared to views " available    Assessment/Plan: Osteoarthritis bilateral knees  Questionable infection right knee following viscosupplementation from gel clinic    Patient I discussed options is very difficult to fully take care of this patient without any of her medical records.  At this time she appears to be asymptomatic for infection right knee is feeling better it looks good on physical examination I am going to send her for CRP sed rate.  Obviously if they are elevated we will need to discuss further options.  Again I do not have any medical records to support patient's history.  I have asked that she obtain these and bring them back into the office for her follow-up with Dr. Perez.  I have advised the patient that I think is looking for surgery at this time but total knee replacement would be off the table for quite some time given possible recent infection.  As well as any additional injections.  Would recommend she continue with physical therapy exercises, Tylenol as needed,  Patient will go to emergency room immediately if her symptoms worsen      Sera Lorenzo, APRN  12/2/2022

## 2022-12-22 ENCOUNTER — OFFICE VISIT (OUTPATIENT)
Dept: ORTHOPEDIC SURGERY | Facility: CLINIC | Age: 72
End: 2022-12-22

## 2022-12-22 VITALS — BODY MASS INDEX: 25.33 KG/M2 | TEMPERATURE: 97.5 F | WEIGHT: 152 LBS | HEIGHT: 65 IN

## 2022-12-22 DIAGNOSIS — M25.561 RIGHT KNEE PAIN, UNSPECIFIED CHRONICITY: Primary | ICD-10-CM

## 2022-12-22 PROCEDURE — 99213 OFFICE O/P EST LOW 20 MIN: CPT | Performed by: ORTHOPAEDIC SURGERY

## 2022-12-22 NOTE — PROGRESS NOTES
Patient: Tatyana Reyes  YOB: 1950 72 y.o. female  Medical Record Number: [id number]    Chief Complaints:   Chief Complaint   Patient presents with   • Left Knee - Initial Evaluation, Pain   • Right Knee - Initial Evaluation, Pain       History of Present Illness:Tatyana Reyes is a 72 y.o. female who presents with complaints of severe) left knee pain.  Fairly complex history which are well-documented in Jane's last note.  Essentially she has been battling arthritis since she had a gel injection and there is some concerns about whether or not she had an infection.  She has been managed by Dr. Adelina Mai and through November there was reported work-up by the patient.  She was told that she was going to need her knee washed out admitted to the hospital but reports to me that she was later discharged and told that there was not infection in the knee based on labs.  I do not have all labs available to me.  She denies any fever chills or systemic signs of infection but does complain of severe right knee pain.    Allergies: No Known Allergies    Medications:   Current Outpatient Medications   Medication Sig Dispense Refill   • amLODIPine (NORVASC) 10 MG tablet Take 5 mg by mouth Daily. TAKE 1/2 TABLET DAILY     • aspirin 81 MG chewable tablet Chew 81 mg Daily.     • B Complex-Biotin-FA (VITAMIN B50 COMPLEX PO) Take  by mouth.     • bisacodyl (DULCOLAX) 5 MG EC tablet Take 1 tablet by mouth Daily.     • Ferrous Sulfate (IRON) 325 (65 Fe) MG tablet Take 1 tablet by mouth Daily.     • furosemide (LASIX) 20 MG tablet Take 1 tablet by mouth Daily. (Patient taking differently: Take 40 mg by mouth Daily.) 90 tablet 3   • levothyroxine (SYNTHROID, LEVOTHROID) 50 MCG tablet Take 50 mcg by mouth Daily.     • metoprolol succinate XL (TOPROL-XL) 50 MG 24 hr tablet TAKE 1 TABLET BY MOUTH EVERY DAY (Patient taking differently: 100 mg. TAKE 1 TABLET BY MOUTH EVERY DAY) 90 tablet 0   • Multiple Vitamins-Minerals  "(CENTRUM SILVER 50+WOMEN) tablet Take 1 tablet by mouth Daily.     • omeprazole (priLOSEC) 40 MG capsule      • rosuvastatin (CRESTOR) 5 MG tablet Take 5 mg by mouth Daily.     • tiZANidine (ZANAFLEX) 4 MG tablet      • triamterene-hydrochlorothiazide (DYAZIDE) 37.5-25 MG per capsule Take  by mouth.     • valsartan (DIOVAN) 160 MG tablet Take 320 mg by mouth Daily. 1 tablet daily     • Cyanocobalamin ER 1000 MCG tablet controlled-release Take  by mouth.       No current facility-administered medications for this visit.         The following portions of the patient's history were reviewed and updated as appropriate: allergies, current medications, past family history, past medical history, past social history, past surgical history and problem list.    Review of Systems:   A 14 point review of systems was performed. All systems negative except pertinent positives/negative listed in HPI above    Physical Exam:   Vitals:    12/22/22 1138   Temp: 97.5 °F (36.4 °C)   TempSrc: Temporal   Weight: 68.9 kg (152 lb)   Height: 165.1 cm (65\")   PainSc: 10-Worst pain ever   PainLoc: Knee       General: A and O x 3, ASA, NAD    SCLERA:    Normal    DENTITION:   Normal   She has very thin skin has ecchymosis about her lower extremity appears to bruise quite easily.  Her knee is painful she has range of motion from roughly 0 to 110 degrees no obvious effusion but does have some mild soft tissue swelling.  She has irritability with motion through the full arc.      Radiology:  Xrays 3views both knees (ap,lateral, sunrise) were taken on 12/2/2022 reviewed for evaluation of knee pain demonstrating advanced valgus osteoarthritis with bone on bone articulation no significant osteophyte formation no evidence of bone erosion or osteomyelitis.    Assessment/Plan: Right knee advanced arthritis her history is concerning although difficult to determine whether there is infection.  I am going to set her up for Synovasure aspiration we will " bring her back once we have the kit will make decisions at that point whether there is any surgical intervention necessary from an infection standpoint or whether we need to discuss knee replacement.      Lito Perez MD  12/22/2022

## 2022-12-23 ENCOUNTER — TELEPHONE (OUTPATIENT)
Dept: ORTHOPEDIC SURGERY | Facility: CLINIC | Age: 72
End: 2022-12-23

## 2022-12-23 NOTE — TELEPHONE ENCOUNTER
I called and spoke to this patient in regards to their symptoms and concerns.  Patient saw Dr. Perez yesterday in the clinic and was concerning for a possible and infection.  Patient is exhibiting symptoms of fever stomachache and pain as well as intense pain to the joint.  Stop of the symptoms I have called in the recommended the patient proceed forward to the ER for a possible septic arthritic joint.  There is an orthopedic surgeon that is on-call that we will be more than able to help assist the patient should they deem this person needs immediate evaluation.   Brea Almanzar D.O.

## 2022-12-23 NOTE — TELEPHONE ENCOUNTER
This patient has not had recent surgery. She saw Dr. Perez a few days as and said she could possibly have an infection. Based off of his note and the current symptoms the patient is experiencing she needs to go to the ER for further evaluation for possible septic arthritis of her knee

## 2022-12-23 NOTE — TELEPHONE ENCOUNTER
Caller: ANAI TURNER    Relationship to patient: SELF    Best call back number: [phone]    Patient is needing: PATIENT IS HAVING INTENSE PAIN RADIATING FROM HER FOOT TO HER HIP, HAS STOMACH PAINS, AND HAS A FEVER. UNABLE TO WARM TRANSFER.

## 2022-12-29 ENCOUNTER — HOSPITAL ENCOUNTER (EMERGENCY)
Facility: HOSPITAL | Age: 72
Discharge: HOME | End: 2022-12-29
Attending: EMERGENCY MEDICINE | Admitting: EMERGENCY MEDICINE
Payer: MEDICARE

## 2022-12-29 ENCOUNTER — APPOINTMENT (OUTPATIENT)
Dept: GENERAL RADIOLOGY | Facility: HOSPITAL | Age: 72
End: 2022-12-29
Payer: MEDICARE

## 2022-12-29 VITALS
DIASTOLIC BLOOD PRESSURE: 88 MMHG | RESPIRATION RATE: 18 BRPM | SYSTOLIC BLOOD PRESSURE: 144 MMHG | OXYGEN SATURATION: 100 % | TEMPERATURE: 98.2 F | HEART RATE: 88 BPM

## 2022-12-29 DIAGNOSIS — G89.29 CHRONIC PAIN OF RIGHT KNEE: Primary | ICD-10-CM

## 2022-12-29 DIAGNOSIS — M25.561 CHRONIC PAIN OF RIGHT KNEE: Primary | ICD-10-CM

## 2022-12-29 DIAGNOSIS — R79.89 ELEVATED LFTS: ICD-10-CM

## 2022-12-29 LAB
ALBUMIN SERPL-MCNC: 3.6 G/DL (ref 3.5–5.2)
ALBUMIN/GLOB SERPL: 1 G/DL
ALP SERPL-CCNC: 73 U/L (ref 39–117)
ALT SERPL W P-5'-P-CCNC: 9 U/L (ref 1–33)
ANION GAP SERPL CALCULATED.3IONS-SCNC: 10.7 MMOL/L (ref 5–15)
AST SERPL-CCNC: 48 U/L (ref 1–32)
BASOPHILS # BLD AUTO: 0.04 10*3/MM3 (ref 0–0.2)
BASOPHILS NFR BLD AUTO: 0.6 % (ref 0–1.5)
BILIRUB SERPL-MCNC: 0.4 MG/DL (ref 0–1.2)
BUN SERPL-MCNC: 15 MG/DL (ref 8–23)
BUN/CREAT SERPL: 14.3 (ref 7–25)
CALCIUM SPEC-SCNC: 9.3 MG/DL (ref 8.6–10.5)
CHLORIDE SERPL-SCNC: 104 MMOL/L (ref 98–107)
CO2 SERPL-SCNC: 22.3 MMOL/L (ref 22–29)
CREAT SERPL-MCNC: 1.05 MG/DL (ref 0.57–1)
DEPRECATED RDW RBC AUTO: 43.1 FL (ref 37–54)
EGFRCR SERPLBLD CKD-EPI 2021: 55.9 ML/MIN/1.73
EOSINOPHIL # BLD AUTO: 0.17 10*3/MM3 (ref 0–0.4)
EOSINOPHIL NFR BLD AUTO: 2.4 % (ref 0.3–6.2)
ERYTHROCYTE [DISTWIDTH] IN BLOOD BY AUTOMATED COUNT: 13.3 % (ref 12.3–15.4)
ERYTHROCYTE [SEDIMENTATION RATE] IN BLOOD: 55 MM/HR (ref 0–30)
GLOBULIN UR ELPH-MCNC: 3.6 GM/DL
GLUCOSE SERPL-MCNC: 115 MG/DL (ref 65–99)
HCT VFR BLD AUTO: 30.1 % (ref 34–46.6)
HGB BLD-MCNC: 10.2 G/DL (ref 12–15.9)
HOLD SPECIMEN: NORMAL
HOLD SPECIMEN: NORMAL
IMM GRANULOCYTES # BLD AUTO: 0.04 10*3/MM3 (ref 0–0.05)
IMM GRANULOCYTES NFR BLD AUTO: 0.6 % (ref 0–0.5)
LYMPHOCYTES # BLD AUTO: 2.06 10*3/MM3 (ref 0.7–3.1)
LYMPHOCYTES NFR BLD AUTO: 28.5 % (ref 19.6–45.3)
MCH RBC QN AUTO: 30.1 PG (ref 26.6–33)
MCHC RBC AUTO-ENTMCNC: 33.9 G/DL (ref 31.5–35.7)
MCV RBC AUTO: 88.8 FL (ref 79–97)
MONOCYTES # BLD AUTO: 0.49 10*3/MM3 (ref 0.1–0.9)
MONOCYTES NFR BLD AUTO: 6.8 % (ref 5–12)
NEUTROPHILS NFR BLD AUTO: 4.42 10*3/MM3 (ref 1.7–7)
NEUTROPHILS NFR BLD AUTO: 61.1 % (ref 42.7–76)
NRBC BLD AUTO-RTO: 0 /100 WBC (ref 0–0.2)
PLATELET # BLD AUTO: 255 10*3/MM3 (ref 140–450)
PMV BLD AUTO: 8.3 FL (ref 6–12)
POTASSIUM SERPL-SCNC: 4.7 MMOL/L (ref 3.5–5.2)
PROT SERPL-MCNC: 7.2 G/DL (ref 6–8.5)
RBC # BLD AUTO: 3.39 10*6/MM3 (ref 3.77–5.28)
SODIUM SERPL-SCNC: 137 MMOL/L (ref 136–145)
WBC NRBC COR # BLD: 7.22 10*3/MM3 (ref 3.4–10.8)
WHOLE BLOOD HOLD COAG: NORMAL
WHOLE BLOOD HOLD SPECIMEN: NORMAL

## 2022-12-29 PROCEDURE — 99284 EMERGENCY DEPT VISIT MOD MDM: CPT

## 2022-12-29 PROCEDURE — 73560 X-RAY EXAM OF KNEE 1 OR 2: CPT

## 2022-12-29 PROCEDURE — 85025 COMPLETE CBC W/AUTO DIFF WBC: CPT | Performed by: EMERGENCY MEDICINE

## 2022-12-29 PROCEDURE — 85652 RBC SED RATE AUTOMATED: CPT | Performed by: EMERGENCY MEDICINE

## 2022-12-29 PROCEDURE — 99283 EMERGENCY DEPT VISIT LOW MDM: CPT

## 2022-12-29 PROCEDURE — 80053 COMPREHEN METABOLIC PANEL: CPT | Performed by: EMERGENCY MEDICINE

## 2022-12-29 RX ORDER — LIDOCAINE 50 MG/G
1 PATCH TOPICAL ONCE
Status: DISCONTINUED | OUTPATIENT
Start: 2022-12-29 | End: 2022-12-29 | Stop reason: HOSPADM

## 2022-12-29 RX ORDER — IBUPROFEN 400 MG/1
400 TABLET ORAL EVERY 8 HOURS PRN
Qty: 12 TABLET | Refills: 0 | Status: SHIPPED | OUTPATIENT
Start: 2022-12-29

## 2022-12-29 RX ADMIN — LIDOCAINE 1 PATCH: 50 PATCH TOPICAL at 13:25

## 2022-12-29 NOTE — CASE MANAGEMENT/SOCIAL WORK
Per provider request, spoke with patient at bedside regarding discharge planning. She reports history of arthritis to bilateral knees and reports difficulty with ADL's at home. I inquired if patient had any previous history of physical therapy and she states that she cannot participate in physical therapy secondary to pain. For this reason, she declines  PT. Explained that if she is unable or unwilling to participate in therapy, she would not be a candidate for rehab, which she agrees. She states that she does not want permanent placement to [facility]. I provided patient with a list of in home personal care agencies to assist with ADL's at home. Furthermore, I suggested that she contact the number on the back of her [employer] card to inquire about any further in home assistance that may be available as a benefit to her. I provided her with the phone numbers for [facility] and [employer] [facility] to assist with travel to/from doctors' appointments.

## 2022-12-29 NOTE — ED NOTES
Patient presents for right knee pain. EMS reports that she has had this pain \"for a while\" and has been seen by multiple doctors and multiple facilities and she doesn't feel like she has gotten a full workup or resolution to her sx. Denies injury. Hx arthritis in knees, knee joint infections

## 2022-12-29 NOTE — CASE MANAGEMENT/SOCIAL WORK
Spoke with Chante with [facility], stretcher transport arranged for 2100. TERRANCE Sosa, BSN RN

## 2022-12-29 NOTE — DISCHARGE INSTRUCTIONS
You are advised to follow closely with Dr. Burt in 2-3 days for recheck, final results of lab work and imaging testing, and further testing/treatment as needed.    Follow with Dr. Lito Perez in 1 week as previously scheduled for recheck, further testing, treatment as needed.    Coordinate with our care coordination team to assist with any resources or equipment you may need for accomplishing self-care at home.      Please return to the emergency department immediately with chest pain different than usual for you, shortness of air, abdominal pain, persistent vomiting/fever, blood in emesis or stool, lightheadedness/fainting, problems with speech, one sided weakness/numbness, new incontinence, problems with vision, increased swelling/redness/warmth of your joint or for worsening of symptoms or other concerns.

## 2022-12-29 NOTE — ED PROVIDER NOTES
EMERGENCY DEPARTMENT ENCOUNTER    CHIEF COMPLAINT  Chief Complaint: Right knee pain  History given by: Patient  History limited by: Nothing  Room Number: A01/01  PMD: Ander Burt MD      HPI:  Pt is a 72 y.o. female complaining of right knee pain worsening for 3 to 4 months.  Patient denies recent fever, cough, chest pain, shortness of air, does report nausea but denies abdominal pain, changes to urinary or bowel habits.  Patient was seen Dr. Edouard Perez's office twice this month for same.  Patient reports that she has pain with attempted ambulation but does have a walker at home.    Duration: 3 to 4 months worsening  Associated Symptoms: Nausea  Aggravating Factors: Movement  Alleviating Factors: Nothing  Treatment before arrival: Tramadol, Tylenol    Patient with osteoarthritis bilateral knees, knee aspiration 11/17 with some concerns for infection, noted to have 9800 nucleated cells at that time but upon reevaluation by orthopedic surgery it was felt patient did not need surgical intervention nor antibiotics at discharge.  Sed rate 12 on 12/2/22 at which time she complained of persistent right knee pain  Patient had a GFR of 40   on 6/2/22    PAST MEDICAL HISTORY  Active Ambulatory Problems     Diagnosis Date Noted   • Persistent atrial fibrillation (HCC) 10/12/2017   • Leg swelling 10/12/2017   • Congestive heart failure (HCC) 10/12/2017   • Essential hypertension 10/12/2017   • Abnormal cardiac function test 12/07/2017   • Coronary artery disease involving native heart without angina pectoris 01/23/2018   • Fatigue due to excessive exertion 02/10/2018   • Abnormal EKG 02/10/2018   • Spondylolisthesis of lumbar region 06/13/2019   • Lumbar spinal stenosis 06/13/2019   • Hyperlipidemia 12/10/2019   • Presence of Watchman left atrial appendage closure device 01/07/2021     Resolved Ambulatory Problems     Diagnosis Date Noted   • Anticoagulated 10/12/2017     Past Medical History:   Diagnosis Date   • Arthritis  of back 2015   • Atrial fibrillation ([facility])    • Atrial fibrillation ([facility])    • CHF (congestive heart failure) ([facility])    • Chronic kidney disease    • Disease of thyroid gland    • DVT (deep venous thrombosis) ([facility]) 09/08/2017   • Dyspnea    • Fracture, foot    • Frozen shoulder 2oo5   • GERD (gastroesophageal reflux disease)    • Hypertension    • Knee swelling 2017   • Low back pain    • Osteoarthritis    • Periarthritis of shoulder 2005       PAST SURGICAL HISTORY  Past Surgical History:   Procedure Laterality Date   • BLADDER REPAIR     • CARDIAC CATHETERIZATION N/A 01/05/2018    Procedure: Left Heart Cath;  Surgeon: Abdi Ramesh MD;  [facility];  Service:    • CARDIAC CATHETERIZATION N/A 01/05/2018    Procedure: Coronary angiography;  Surgeon: Abdi Ramesh MD;  [facility];  Service:    • CARDIAC CATHETERIZATION N/A 01/05/2018    Procedure: Left ventriculography;  Surgeon: Abdi Ramesh MD;  [facility] C[facility] LOC[facility];  Service:    • CARDIAC CATHETERIZATION N/A 01/05/2018    Procedure: Stent BMS coronary;  Surgeon: Abdi Rmaesh MD;  [facility];  Service:    • EPIDURAL BLOCK     • JOINT REPLACEMENT  20015   • PARATHYROID GLAND SURGERY     • NE RT/LT HEART CATHETERS N/A 01/05/2018    Procedure: Percutaneous Coronary Intervention;  Surgeon: Abdi Ramesh MD;  Location: [facility] C[facility] LOC[facility];  Service: Cardiovascular   • SHOULDER SURGERY     • TONSILLECTOMY     • TOTAL SHOULDER REPLACEMENT Left 09/08/2017   • TRIGGER POINT INJECTION  03/2019    Cortazone injections shoulder and knees 3 months.   • TUBAL ABDOMINAL LIGATION         FAMILY HISTORY  Family History   Problem Relation Age of Onset   • Heart disease Mother    • Stroke Mother    • Diabetes Brother        SOCIAL HISTORY  Social History     Socioeconomic History   • Marital status:    • Number of children:  1   • Years of education: 12   • Highest education level: High school graduate   Tobacco Use   • Smoking status: Never   • Smokeless tobacco: Never   Substance and Sexual Activity   • Alcohol use: No   • Drug use: No   • Sexual activity: Not Currently     Birth control/protection: Tubal ligation       ALLERGIES  Patient has no known allergies.    REVIEW OF SYSTEMS  Review of Systems   Constitutional: Negative for chills and fever.   HENT: Negative for sore throat and trouble swallowing.    Eyes: Negative for visual disturbance.   Respiratory: Negative for cough and shortness of breath.    Cardiovascular: Negative for chest pain, palpitations and leg swelling.   Gastrointestinal: Positive for nausea. Negative for abdominal pain, diarrhea and vomiting.   Endocrine: Negative.    Genitourinary: Negative for decreased urine volume, dysuria and frequency.   Musculoskeletal: Positive for arthralgias. Negative for neck pain.   Skin: Negative for rash.   Allergic/Immunologic: Negative.    Neurological: Negative for syncope, weakness, numbness and headaches.   Hematological: Negative.    Psychiatric/Behavioral: Negative.    All other systems reviewed and are negative.      PHYSICAL EXAM  ED Triage Vitals [12/29/22 1143]   Temp Heart Rate Resp BP SpO2   98.2 °F (36.8 °C) 88 18 144/88 100 %      Temp src Heart Rate Source Patient Position BP Location FiO2 (%)   Oral -- -- -- --       Physical Exam  Vitals and nursing note reviewed.   Constitutional:       General: She is in acute distress (mild).      Appearance: She is not toxic-appearing.   HENT:      Head: Normocephalic and atraumatic.   Cardiovascular:      Rate and Rhythm: Normal rate and regular rhythm.      Pulses:           Posterior tibial pulses are 2+ on the right side and 2+ on the left side.      Heart sounds: Normal heart sounds. No murmur heard.  Pulmonary:      Effort: Pulmonary effort is normal. No respiratory distress.      Breath sounds: Normal breath sounds.    Abdominal:      General: Bowel sounds are normal.      Palpations: Abdomen is soft.      Tenderness: There is no abdominal tenderness. There is no guarding or rebound.   Musculoskeletal:      Cervical back: Normal range of motion and neck supple.      Right knee: No deformity, effusion or erythema. Decreased range of motion. Tenderness present. Normal pulse.      Instability Tests: Anterior drawer test negative. Posterior drawer test negative.      Left knee: Decreased range of motion. Tenderness present. Normal pulse.      Instability Tests: Anterior drawer test negative. Posterior drawer test negative.      Comments: Patient with no warmth, erythema, effusion of her right knee at the time of exam   Skin:     General: Skin is warm and dry.   Neurological:      Mental Status: She is alert and oriented to person, place, and time.   Psychiatric:         Mood and Affect: Affect normal.         LAB RESULTS  Lab Results (last 24 hours)     Procedure Component Value Units Date/Time    CBC & Differential [[id number]]  (Abnormal) Collected: 12/29/22 1515    Specimen: Blood Updated: 12/29/22 1524    Narrative:      The following orders were created for panel order CBC & Differential.  Procedure                               Abnormality         Status                     ---------                               -----------         ------                     CBC Auto Differential[[id number]]        Abnormal            Final result                 Please view results for these tests on the individual orders.    Comprehensive Metabolic Panel [[id number]]  (Abnormal) Collected: 12/29/22 1515    Specimen: Blood Updated: 12/29/22 1642     Glucose 115 mg/dL      BUN 15 mg/dL      Creatinine 1.05 mg/dL      Sodium 137 mmol/L      Potassium 4.7 mmol/L      Comment: Specimen hemolyzed.  Results may be affected.        Chloride 104 mmol/L      CO2 22.3 mmol/L      Calcium 9.3 mg/dL      Total Protein 7.2 g/dL      Albumin 3.6 g/dL       ALT (SGPT) 9 U/L      Comment: Specimen hemolyzed.  Results may be affected.        AST (SGOT) 48 U/L      Comment: Specimen hemolyzed.  Results may be affected.        Alkaline Phosphatase 73 U/L      Total Bilirubin 0.4 mg/dL      Globulin 3.6 gm/dL      A/G Ratio 1.0 g/dL      BUN/Creatinine Ratio 14.3     Anion Gap 10.7 mmol/L      eGFR 55.9 mL/min/1.73      Comment: National Kidney Foundation and American Society of Nephrology (ASN) Task Force recommended calculation based on the Chronic Kidney Disease Epidemiology Collaboration (CKD-EPI) equation refit without adjustment for race.       Narrative:      GFR Normal >60  Chronic Kidney Disease <60  Kidney Failure <15    The GFR formula is only valid for adults with stable renal function between ages 18 and 70.    Sedimentation Rate [[id number]]  (Abnormal) Collected: 12/29/22 1515    Specimen: Blood Updated: 12/29/22 1538     Sed Rate 55 mm/hr     CBC Auto Differential [[id number]]  (Abnormal) Collected: 12/29/22 1515    Specimen: Blood Updated: 12/29/22 1524     WBC 7.22 10*3/mm3      RBC 3.39 10*6/mm3      Hemoglobin 10.2 g/dL      Hematocrit 30.1 %      MCV 88.8 fL      MCH 30.1 pg      MCHC 33.9 g/dL      RDW 13.3 %      RDW-SD 43.1 fl      MPV 8.3 fL      Platelets 255 10*3/mm3      Neutrophil % 61.1 %      Lymphocyte % 28.5 %      Monocyte % 6.8 %      Eosinophil % 2.4 %      Basophil % 0.6 %      Immature Grans % 0.6 %      Neutrophils, Absolute 4.42 10*3/mm3      Lymphocytes, Absolute 2.06 10*3/mm3      Monocytes, Absolute 0.49 10*3/mm3      Eosinophils, Absolute 0.17 10*3/mm3      Basophils, Absolute 0.04 10*3/mm3      Immature Grans, Absolute 0.04 10*3/mm3      nRBC 0.0 /100 WBC           I ordered the above labs and reviewed the results    RADIOLOGY  XR Knee 1 or 2 View Right   Final Result       As described.       This report was finalized on 12/29/2022 1:40 PM by Dr. Hoang Guardado M.D.               I ordered the above noted radiological  studies. Interpreted by radiologist. Viewed by me in PACS.       PROCEDURES  Procedures      PROGRESS AND CONSULTS  ED Course as of 12/29/22 2252   Thu Dec 29, 2022   1336 Discussed with JACEK De Paz about patient struggles with mobility at home and she agrees to discuss with patient any resources we may be able to give her as an outpatient. [TO]   1446 JACEK Morris reports patient states she cannot navigate the steps into her home and must have assistance.  He reports his only resource at this point is [facility] ambulance service which is not available until at least 11 PM. [TO]   1755 RN reports patient is able to ambulate to the bathroom down the hallway with a walker without assist.   [TO]      ED Course User Index  [TO] Maripsoa Naranjo MD           MEDICAL DECISION MAKING  Results were reviewed/discussed with the patient and they were also made aware of online access. Pt also made aware that some labs, such as cultures, will not be resulted during ER visit and followup with PMD is necessary.       MDM       DIAGNOSIS  Final diagnoses:   Chronic pain of right knee   Elevated LFTs       DISPOSITION  DISCHARGE    Patient discharged in stable condition.    Reviewed implications of results, diagnosis, meds, responsibility to follow up, warning signs and symptoms of possible worsening, potential complications and reasons to return to ER, including fever, increased swelling, redness, pain, weakness, numbness, discoloration or other concerns.    Patient/Family voiced understanding of above instructions.    Discussed plan for discharge, as there is no emergent indication for admission. Patient referred to primary care provider for BP management due to today's BP. Pt/family is agreeable and understands need for follow up and repeat testing.  Pt is aware that discharge does not mean that nothing is wrong but it indicates no emergency is present that requires admission and they must continue care with follow-up as given  below or physician of their choice.     FOLLOW-UP  Ander Burt MD  [address]  [address]  [place] [id number] [phone]    Schedule an appointment as soon as possible for a visit in 3 days      Lito Perez MD  [address] [facility] [address]  [facility] [id number] [phone]    Go in 1 week  EVEN IF WELL         Medication List      New Prescriptions    ibuprofen 400 MG tablet  Commonly known as: ADVIL,MOTRIN  Take 1 tablet by mouth Every 8 (Eight) Hours As Needed for Mild Pain or Moderate Pain. Take with food        Changed    furosemide 20 MG tablet  Commonly known as: LASIX  Take 1 tablet by mouth Daily.  What changed: how much to take     metoprolol succinate XL 50 MG 24 hr tablet  Commonly known as: TOPROL-XL  TAKE 1 TABLET BY MOUTH EVERY DAY  What changed: how much to take           Where to Get Your Medications      These medications were sent to [facility] [id number] - [place], [place] - [address] [facility] - [phone]  - [phone]   [address] [address]    Phone: [phone]   · ibuprofen 400 MG tablet           Latest Documented Vital Signs:  As of 13:08 EST  BP- 144/88 HR- 88 Temp- 98.2 °F (36.8 °C) (Oral) O2 sat- 100%    --  Patient was wearing facemask when I entered the room and throughout our encounter. Full protective equipment was worn throughout this patient encounter including a face mask, eye protection and gloves. Hand hygiene was performed before donning protective equipment and after removal when leaving the room.      Mariposa Naranjo MD  12/29/22 4279

## 2022-12-29 NOTE — CASE MANAGEMENT/SOCIAL WORK
Contacted [facility] dispatch to arrange wheelchair van transport back to patient's home. Awaiting return call with VINICIUS Rutherford RN

## 2022-12-30 ENCOUNTER — NURSE TRIAGE (OUTPATIENT)
Dept: CALL CENTER | Facility: HOSPITAL | Age: 72
End: 2022-12-30

## 2022-12-30 ENCOUNTER — TELEPHONE (OUTPATIENT)
Dept: ORTHOPEDIC SURGERY | Facility: CLINIC | Age: 72
End: 2022-12-30

## 2022-12-30 NOTE — TELEPHONE ENCOUNTER
In speaking Neo with [facility] this patient was connected with the crisis line, the encounter below was to note this call   Umer

## 2022-12-30 NOTE — TELEPHONE ENCOUNTER
"[facility] call - Unable to reach provider     Caller with severe Right knee pain, unrelieved with Tylenol, Lidocaine patch and ice. Tearful. Seen by Dr. Lito Perez in past. Positive Curtis's sign.  Seen in ED yesterday and instructed to f/u with Ortho. Has appt. Next Thursday. Caller states does not have ride to be seen at office.  Guidelines followed, protocol reviewed. Instructed needs to go to ED for evaluation . States does not think they will do anything. Verified with Dr. Perez's office, recommended ED as well as they have no availablitiy to see today.  Caller verbalized understanding and tearfully agreed to go to ED. States will have to call EMS.  Reason for Disposition  • Thigh or calf pain and only 1 side and present > 1 hour    Additional Information  • Negative: Sounds like a life-threatening emergency to the triager  • Negative: Followed a knee injury  • Negative: Swollen knee joint and fever    Answer Assessment - Initial Assessment Questions  1. LOCATION and RADIATION: \"Where is the pain located?\"       Right knee radiating up and down leg to foot  2. QUALITY: \"What does the pain feel like?\"  (e.g., sharp, dull, aching, burning)      Sharp constant  3. SEVERITY: \"How bad is the pain?\" \"What does it keep you from doing?\"   (Scale 1-10; or mild, moderate, severe)    -  MILD (1-3): doesn't interfere with normal activities     -  MODERATE (4-7): interferes with normal activities (e.g., work or school) or awakens from sleep, limping     -  SEVERE (8-10): excruciating pain, unable to do any normal activities, unable to walk      Severe  4. ONSET: \"When did the pain start?\" \"Does it come and go, or is it there all the time?\"      Yesterday. Constant  5. RECURRENT: \"Have you had this pain before?\" If Yes, ask: \"When, and what happened then?\"      Yes .   6. SETTING: \"Has there been any recent work, exercise or other activity that involved that part of the body?\"       No  7. AGGRAVATING FACTORS: \"What makes the knee " "pain worse?\" (e.g., walking, climbing stairs, running)      Walking  8. ASSOCIATED SYMPTOMS: \"Is there any swelling or redness of the knee?\"      Moderate swelling  9. OTHER SYMPTOMS: \"Do you have any other symptoms?\" (e.g., chest pain, difficulty breathing, fever, calf pain)      Calf pain  10. PREGNANCY: \"Is there any chance you are pregnant?\" \"When was your last menstrual period?\"       No    Protocols used: KNEE PAIN-ADULT-[place]      "

## 2022-12-30 NOTE — TELEPHONE ENCOUNTER
Caller: Tatyana Reyes    Relationship to patient: Self    Best call back number:     Patient is needing: UNABLE TO WARM TRANSFER.  PATIENT WAS SEEN LAST WEEK FOR KNEE PAIN AND IT GOT SO BAD SHE WENT TO ER YESTERDAY AND SAID THEY DIDN'T HELP.  PATIENT MENTIONED SHE WAS HURTING SO BAD SHE MAY DO HARM TO HERSELF.  WARM TRANSFERRED TO *22

## 2023-01-05 ENCOUNTER — TELEPHONE (OUTPATIENT)
Dept: ORTHOPEDIC SURGERY | Facility: CLINIC | Age: 73
End: 2023-01-05

## 2023-01-05 ENCOUNTER — OFFICE VISIT (OUTPATIENT)
Dept: ORTHOPEDIC SURGERY | Facility: CLINIC | Age: 73
End: 2023-01-05
Payer: MEDICARE

## 2023-01-05 VITALS — WEIGHT: 152 LBS | RESPIRATION RATE: 12 BRPM | BODY MASS INDEX: 25.33 KG/M2 | HEIGHT: 65 IN | TEMPERATURE: 97.4 F

## 2023-01-05 DIAGNOSIS — M17.11 PRIMARY OSTEOARTHRITIS OF RIGHT KNEE: Primary | ICD-10-CM

## 2023-01-05 PROCEDURE — 99213 OFFICE O/P EST LOW 20 MIN: CPT | Performed by: ORTHOPAEDIC SURGERY

## 2023-01-05 RX ORDER — ACETAMINOPHEN 500 MG
1000 TABLET ORAL EVERY 6 HOURS PRN
COMMUNITY

## 2023-01-05 NOTE — TELEPHONE ENCOUNTER
Caller: Tatyana Reyes    Relationship to patient: Self    Best call back number:     Patient is needing: UNABLE TO WARM TRANSFER.  PATIENT HAS BEEN IN LOT SINCE 1050 CALLING AND NEEDS HELP WITH A WHEELCHAIR.

## 2023-01-09 NOTE — PROGRESS NOTES
Patient: Tatyana Reyes  YOB: 1950 72 y.o. female  Medical Record Number: [id number]    Chief Complaints:   Chief Complaint   Patient presents with   • Right Knee - Follow-up       History of Present Illness:Tatyana Reyes is a 72 y.o. female who presents for follow-up of right knee.  She is having considerable pain within the right knee.  She denies any fever or chills she is not febrile today.  Examination of her knee shows no significant fluctuance or erythema but she does have pain with range of motion.  Again she has had extensive work-up including aspiration admission and prolonged evaluation at [facility].  She was here for another opinion and had concerns about being taken care of at [facility] or [facility].    Allergies: No Known Allergies    Medications:   Current Outpatient Medications   Medication Sig Dispense Refill   • acetaminophen (TYLENOL) 500 MG tablet Take 1,000 mg by mouth Every 6 (Six) Hours As Needed for Mild Pain.     • amLODIPine (NORVASC) 10 MG tablet Take 5 mg by mouth Daily. TAKE 1/2 TABLET DAILY     • aspirin 81 MG chewable tablet Chew 81 mg Daily.     • B Complex-Biotin-FA (VITAMIN B50 COMPLEX PO) Take  by mouth.     • bisacodyl (DULCOLAX) 5 MG EC tablet Take 1 tablet by mouth Daily.     • Cyanocobalamin ER 1000 MCG tablet controlled-release Take  by mouth.     • Ferrous Sulfate (IRON) 325 (65 Fe) MG tablet Take 1 tablet by mouth Daily.     • furosemide (LASIX) 20 MG tablet Take 1 tablet by mouth Daily. (Patient taking differently: Take 40 mg by mouth Daily.) 90 tablet 3   • levothyroxine (SYNTHROID, LEVOTHROID) 50 MCG tablet Take 50 mcg by mouth Daily.     • metoprolol succinate XL (TOPROL-XL) 50 MG 24 hr tablet TAKE 1 TABLET BY MOUTH EVERY DAY (Patient taking differently: 100 mg. TAKE 1 TABLET BY MOUTH EVERY DAY) 90 tablet 0   • Multiple Vitamins-Minerals (CENTRUM SILVER 50+WOMEN) tablet Take 1 tablet by mouth Daily.     • omeprazole (priLOSEC) 40 MG  capsule      • rosuvastatin (CRESTOR) 5 MG tablet Take 5 mg by mouth Daily.     • tiZANidine (ZANAFLEX) 4 MG tablet      • triamterene-hydrochlorothiazide (DYAZIDE) 37.5-25 MG per capsule Take  by mouth.     • valsartan (DIOVAN) 160 MG tablet Take 320 mg by mouth Daily. 1 tablet daily     • ibuprofen (ADVIL,MOTRIN) 400 MG tablet Take 1 tablet by mouth Every 8 (Eight) Hours As Needed for Mild Pain or Moderate Pain. Take with food 12 tablet 0     No current facility-administered medications for this visit.         The following portions of the patient's history were reviewed and updated as appropriate: allergies, current medications, past family history, past medical history, past social history, past surgical history and problem list.    Review of Systems:   A 14 point review of systems was performed. All systems negative except pertinent positives/negative listed in HPI above    Physical Exam:   Vitals:    01/05/23 1115   Resp: 12   Temp: 97.4 °F (36.3 °C)   Weight: 68.9 kg (152 lb)   Height: 165.1 cm (65\")       General: A and O x 3, ASA, NAD    SCLERA:    Normal    DENTITION:   Normal  Considerable right knee pain with range of motion.  No effusion no erythema no fluctuance walks with an antalgic gait    Radiology:  Xrays 3views (ap,lateral, sunrise) taken previously demonstrating essentially complete loss of the joint space.  There is no osteolysis no evidence of bone erosion    Assessment/Plan:  Right knee pain.  At a minimum she has advanced arthritis with joint space loss.  I attempted to aspirate the knee but after multiple attempts was unable to obtain any fluid from the knee.  Infection certainly is a possibility but without any fluid sample I would have difficulty confirming.  I had further discussions with her about the fact that I am unavailable to treat her surgically for at least the next 4 months due to me undergoing surgery.  Given the complexity of these issues and my inability to provide her any  surgical intervention I recommended that she follow-up with her surgeon who has been fully worked her up for this process.  I feel this will help with continuity and given the previous lab work and aspiration they may have more with inability to make an accurate diagnosis.  After extensive discussion she will follow back up with her initial surgeon for further management of this issue.

## 2023-08-04 ENCOUNTER — OFFICE VISIT (OUTPATIENT)
Dept: CARDIOLOGY | Facility: CLINIC | Age: 73
End: 2023-08-04
Payer: MEDICARE

## 2023-08-04 VITALS
BODY MASS INDEX: 23.82 KG/M2 | HEIGHT: 65 IN | DIASTOLIC BLOOD PRESSURE: 80 MMHG | SYSTOLIC BLOOD PRESSURE: 120 MMHG | HEART RATE: 50 BPM | WEIGHT: 143 LBS

## 2023-08-04 DIAGNOSIS — I34.0 NONRHEUMATIC MITRAL VALVE REGURGITATION: ICD-10-CM

## 2023-08-04 DIAGNOSIS — I48.19 PERSISTENT ATRIAL FIBRILLATION: Primary | ICD-10-CM

## 2023-08-04 DIAGNOSIS — I50.9 CONGESTIVE HEART FAILURE, UNSPECIFIED HF CHRONICITY, UNSPECIFIED HEART FAILURE TYPE: ICD-10-CM

## 2023-08-04 DIAGNOSIS — R94.31 ABNORMAL ELECTROCARDIOGRAM (ECG) (EKG): ICD-10-CM

## 2023-08-04 PROCEDURE — 93000 ELECTROCARDIOGRAM COMPLETE: CPT | Performed by: NURSE PRACTITIONER

## 2023-08-04 PROCEDURE — 3079F DIAST BP 80-89 MM HG: CPT | Performed by: NURSE PRACTITIONER

## 2023-08-04 PROCEDURE — 3074F SYST BP LT 130 MM HG: CPT | Performed by: NURSE PRACTITIONER

## 2023-08-04 PROCEDURE — 1159F MED LIST DOCD IN RCRD: CPT | Performed by: NURSE PRACTITIONER

## 2023-08-04 PROCEDURE — 1160F RVW MEDS BY RX/DR IN RCRD: CPT | Performed by: NURSE PRACTITIONER

## 2023-08-04 PROCEDURE — 99214 OFFICE O/P EST MOD 30 MIN: CPT | Performed by: NURSE PRACTITIONER

## 2023-08-04 RX ORDER — METOPROLOL SUCCINATE 25 MG/1
37.5 TABLET, EXTENDED RELEASE ORAL DAILY
Qty: 30 TABLET | Refills: 11 | Status: SHIPPED | OUTPATIENT
Start: 2023-08-04

## 2023-08-07 ENCOUNTER — TELEPHONE (OUTPATIENT)
Dept: CARDIOLOGY | Facility: CLINIC | Age: 73
End: 2023-08-07
Payer: MEDICARE

## 2023-08-07 NOTE — TELEPHONE ENCOUNTER
Caller: Tatyana Reyes    Relationship to patient: Self    Best call back number: [phone]    Patient is needing: PATIENT STATED THAT HER METOPRLOL SUCCINATE WAS CHANGED FROM 100 MG TO 37.5 MG AND SHE IS CONCERNED ABOUT THE CHANGE BECAUSE SHE HAS STAGE 3 KIDNEY DIEASE.

## 2023-08-08 ENCOUNTER — PATIENT MESSAGE (OUTPATIENT)
Dept: CARDIOLOGY | Facility: CLINIC | Age: 73
End: 2023-08-08
Payer: MEDICARE

## 2023-08-09 NOTE — TELEPHONE ENCOUNTER
SPOKE WITH PT.  WILL ADDRESS WITH TERESA BARAHONA AND CALL PT BACK THIS AFTERNOON.    TERESA BARAHONA SPOKE WITH PT.

## 2023-08-10 RX ORDER — METOPROLOL SUCCINATE 25 MG/1
37.5 TABLET, EXTENDED RELEASE ORAL 2 TIMES DAILY
Qty: 135 TABLET | Refills: 1 | Status: SHIPPED | OUTPATIENT
Start: 2023-08-10

## 2023-08-10 NOTE — TELEPHONE ENCOUNTER
PT CALL AND SAID [facility] WILL NOT FILL RX THAT WAS SENT OVER. IT NEEDS TO BE WROTE DIFFERENTLY BECAUSE INSURANCE THINKS IT IS A DUPLICATE RX.  PT WANTS A 90 DAY SUPPLY  IF YOU HAVE ANY QUESTIONS PLEASE CALL PT [phone]

## 2023-08-15 ENCOUNTER — HOSPITAL ENCOUNTER (OUTPATIENT)
Dept: CARDIOLOGY | Facility: HOSPITAL | Age: 73
Discharge: HOME | End: 2023-08-15
Admitting: NURSE PRACTITIONER
Payer: MEDICARE

## 2023-08-15 VITALS
HEIGHT: 65 IN | HEART RATE: 50 BPM | WEIGHT: 141.09 LBS | DIASTOLIC BLOOD PRESSURE: 70 MMHG | SYSTOLIC BLOOD PRESSURE: 120 MMHG | BODY MASS INDEX: 23.51 KG/M2

## 2023-08-15 PROCEDURE — 93306 TTE W/DOPPLER COMPLETE: CPT

## 2023-08-15 PROCEDURE — 93306 TTE W/DOPPLER COMPLETE: CPT | Performed by: INTERNAL MEDICINE

## 2023-08-16 LAB
AORTIC DIMENSIONLESS INDEX: 0.56 (DI)
ASCENDING AORTA: 2.5 CM
BH CV ECHO MEAS - ACS: 1.7 CM
BH CV ECHO MEAS - AO MAX PG: 8.9 MMHG
BH CV ECHO MEAS - AO MEAN PG: 4.3 MMHG
BH CV ECHO MEAS - AO ROOT DIAM: 2.5 CM
BH CV ECHO MEAS - AO V2 MAX: 148.7 CM/SEC
BH CV ECHO MEAS - AO V2 VTI: 37.6 CM
BH CV ECHO MEAS - AVA(I,D): 1.6 CM2
BH CV ECHO MEAS - EDV(CUBED): 98.6 ML
BH CV ECHO MEAS - EDV(MOD-SP2): 70.1 ML
BH CV ECHO MEAS - EDV(MOD-SP4): 83.7 ML
BH CV ECHO MEAS - EF(MOD-BP): 61.1 %
BH CV ECHO MEAS - EF(MOD-SP2): 63.8 %
BH CV ECHO MEAS - EF(MOD-SP4): 58.7 %
BH CV ECHO MEAS - ESV(CUBED): 25 ML
BH CV ECHO MEAS - ESV(MOD-SP2): 25.4 ML
BH CV ECHO MEAS - ESV(MOD-SP4): 34.6 ML
BH CV ECHO MEAS - FS: 36.7 %
BH CV ECHO MEAS - IVS/LVPW: 1.01 CM
BH CV ECHO MEAS - IVSD: 1.23 CM
BH CV ECHO MEAS - LA A2CS (ATRIAL LENGTH): 5.8 CM
BH CV ECHO MEAS - LA A4C LENGTH: 5.8 CM
BH CV ECHO MEAS - LAT PEAK E' VEL: 8.3 CM/SEC
BH CV ECHO MEAS - LV MASS(C)D: 212.6 GRAMS
BH CV ECHO MEAS - LV MAX PG: 2.7 MMHG
BH CV ECHO MEAS - LV MEAN PG: 1.5 MMHG
BH CV ECHO MEAS - LV V1 MAX: 81.3 CM/SEC
BH CV ECHO MEAS - LV V1 VTI: 19.6 CM
BH CV ECHO MEAS - LVIDD: 4.6 CM
BH CV ECHO MEAS - LVIDS: 2.9 CM
BH CV ECHO MEAS - LVOT AREA: 2.5 CM2
BH CV ECHO MEAS - LVOT DIAM: 1.8 CM
BH CV ECHO MEAS - LVPWD: 1.22 CM
BH CV ECHO MEAS - MED PEAK E' VEL: 9.9 CM/SEC
BH CV ECHO MEAS - MR MAX PG: 41.7 MMHG
BH CV ECHO MEAS - MR MAX VEL: 322.3 CM/SEC
BH CV ECHO MEAS - MV DEC SLOPE: 467 CM/SEC2
BH CV ECHO MEAS - MV DEC TIME: 0.17 MSEC
BH CV ECHO MEAS - MV E MAX VEL: 100 CM/SEC
BH CV ECHO MEAS - MV MAX PG: 6.5 MMHG
BH CV ECHO MEAS - MV MEAN PG: 3 MMHG
BH CV ECHO MEAS - MV P1/2T: 81.5 MSEC
BH CV ECHO MEAS - MV V2 VTI: 23.1 CM
BH CV ECHO MEAS - MVA(P1/2T): 2.7 CM2
BH CV ECHO MEAS - MVA(VTI): 2.16 CM2
BH CV ECHO MEAS - PA ACC TIME: 0.11 SEC
BH CV ECHO MEAS - PA V2 MAX: 110 CM/SEC
BH CV ECHO MEAS - PI END-D VEL: 129 CM/SEC
BH CV ECHO MEAS - PULM DIAS VEL: 35.7 CM/SEC
BH CV ECHO MEAS - PULM S/D: 0.84
BH CV ECHO MEAS - PULM SYS VEL: 29.9 CM/SEC
BH CV ECHO MEAS - QP/QS: 1.36
BH CV ECHO MEAS - RAP SYSTOLE: 15 MMHG
BH CV ECHO MEAS - RV MAX PG: 2.6 MMHG
BH CV ECHO MEAS - RV V1 MAX: 80.5 CM/SEC
BH CV ECHO MEAS - RV V1 VTI: 19.6 CM
BH CV ECHO MEAS - RVOT DIAM: 2.1 CM
BH CV ECHO MEAS - RVSP: 47 MMHG
BH CV ECHO MEAS - SV(LVOT): 49.9 ML
BH CV ECHO MEAS - SV(MOD-SP2): 44.7 ML
BH CV ECHO MEAS - SV(MOD-SP4): 49.1 ML
BH CV ECHO MEAS - SV(RVOT): 67.9 ML
BH CV ECHO MEAS - TAPSE (>1.6): 2.04 CM
BH CV ECHO MEAS - TR MAX PG: 31.8 MMHG
BH CV ECHO MEAS - TR MAX VEL: 281.7 CM/SEC
BH CV ECHO MEASUREMENTS AVERAGE E/E' RATIO: 10.99
BH CV XLRA - RV BASE: 2.6 CM
BH CV XLRA - RV LENGTH: 6.6 CM
BH CV XLRA - RV MID: 3.5 CM
BH CV XLRA - TDI S': 10.8 CM/SEC
SINUS: 2.41 CM
STJ: 1.9 CM

## 2023-08-29 NOTE — PROGRESS NOTES
Subjective:        Tatyana Reyes is a 73 y.o. female who here for follow up    No chief complaint on file.    results    HPI      This is a 73-year-old female who is today to go over test results.  She has a history of arthritis, chronic A-fib, CHF, history of DVT, dyspnea, hypertension and GERD.  On her recent visit she had a Holter and an echo and she had declined a stress test at that time as she felt she has been under stress lately and did not want to completed.  She underwent these testing for bradycardia.  Her Holter monitor showed atrial fibrillation with controlled rate and a 3-second pause.  I will make medication adjustments to her beta-blocker.  Her echo revealed EF 61 to 65%, LV diastolic function was normal, mild aortic valve stenosis, RV systolic pressure from TV regurgitation is moderately elevated.    Negative stress test in 2019.  Stent placement to proximal LAD in 2019.      The following portions of the patient's history were reviewed and updated as appropriate: allergies, current medications, past family history, past medical history, past social history, past surgical history and problem list.    Past Medical History:   Diagnosis Date    Arthritis of back 2015    Atrial fibrillation     Atrial fibrillation     CHF (congestive heart failure)     Chronic kidney disease     Disease of thyroid gland     DVT (deep venous thrombosis) 09/08/2017    BILAT LE S/P SHOULDER REPLACEMENT    Dyspnea     Fracture, foot     Frozen shoulder 2oo5    GERD (gastroesophageal reflux disease)     Hypertension     Knee swelling 2017    Low back pain     Osteoarthritis     Periarthritis of shoulder 2005         reports that she has never smoked. She has never used smokeless tobacco. She reports that she does not drink alcohol and does not use drugs.     Family History   Problem Relation Age of Onset    Heart disease Mother     Stroke Mother     Diabetes Brother        ROS     Review of Systems  Constitutional: No wt  loss, fever, fatigue  Gastrointestinal: No nausea, abdominal pain  Behavioral/Psych: No insomnia or anxiety  Cardiovascular no chest pain or shortness of breath      Objective:           Vitals and nursing note reviewed.   Constitutional:       Appearance: Well-developed.   HENT:      Head: Normocephalic.      Right Ear: External ear normal.      Left Ear: External ear normal.   Neck:      Vascular: No JVD.   Pulmonary:      Effort: Pulmonary effort is normal. No respiratory distress.      Breath sounds: Normal breath sounds. No stridor. No rales.   Cardiovascular:      Normal rate. Regular rhythm.      No gallop.    Pulses:     Intact distal pulses.   Edema:     Peripheral edema absent.   Abdominal:      General: Bowel sounds are normal. There is no distension.      Palpations: Abdomen is soft.      Tenderness: There is no abdominal tenderness. There is no guarding.   Musculoskeletal: Normal range of motion.         General: No tenderness.      Cervical back: Normal range of motion. Skin:     General: Skin is warm.   Neurological:      Mental Status: Alert and oriented to person, place, and time.      Deep Tendon Reflexes: Reflexes are normal and symmetric.   Psychiatric:         Judgment: Judgment normal.       Procedures    8/15/23    2023    Left ventricular ejection fraction appears to be 61 - 65%.    Left ventricular diastolic function was normal.    Mild aortic valve stenosis is present.    Estimated right ventricular systolic pressure from tricuspid regurgitation is moderately elevated (45-55 mmHg).    2018    Interpretation Summary       Arrhythmias were not significant.  Equivocal ECG evidence of myocardial ischemia.Negative clinical evidence of myocardial ischemia. Findings consistent with an equivocal ECG stress test. Submaximal Stress Test Asymptomatic for chest pain     Submaximal Stress Test  Asymptomatic for chest pain. Specificity of study reduced secondary to Target Heart Rate not  achieved.  Ectopy: none.  Blood pressure response:  appropriate. Exercise tolerance good    2018  HEMODYNAMIC / ANGIOGRAPHIC DATA:    Left ventricular end diastolic pressure was 10 mmHg.  Left ventriculography revealed an EF around 50%.    The left main is normal.  The left anterior descending artery is proximal 80% reduced to 0% with 3.0/15 dilated to 3.2  Vision stent.  The left circumflex is codominant and normal.  The right coronary artery is codominant and normal.  Successful stenting of the proximal LAD, with reduction of stenosis from 80% to 0% using 3.0/15 dilated to 3.2.     KEENA FLOW    PRE... 3....       POST.  3.....     TYPE OF LESION...... B  1.......     RECOMMENDATIONS:  Post-procedure care will focus on prevention of any ischemic events and congestive complications.  Aggressive risk factor modification will be carried out.  Importance of taking dual antiplatelets for one year  has been explained, risk of stent thrombosis leading to the acute MI, which carries high morbidity and mortality has been explained     Discontinuation or interruptions of these medications should be under the strict guidance of appropriate health professional     Vision stent was placed as the patient needed shoulder surgery        Current Outpatient Medications:     acetaminophen (TYLENOL) 500 MG tablet, Take 2 tablets by mouth Every 6 (Six) Hours As Needed for Mild Pain., Disp: , Rfl:     amLODIPine (NORVASC) 10 MG tablet, Take 0.5 tablets by mouth Daily. TAKE 1/2 TABLET DAILY, Disp: , Rfl:     aspirin 81 MG chewable tablet, Chew 1 tablet Daily., Disp: , Rfl:     B Complex-Biotin-FA (VITAMIN B50 COMPLEX PO), Take  by mouth., Disp: , Rfl:     bisacodyl (DULCOLAX) 5 MG EC tablet, Take 1 tablet by mouth Daily., Disp: , Rfl:     Ferrous Sulfate (IRON) 325 (65 Fe) MG tablet, Take 1 tablet by mouth Daily., Disp: , Rfl:     furosemide (LASIX) 20 MG tablet, Take 1 tablet by mouth Daily. (Patient taking differently: Take 2 tablets  by mouth Daily.), Disp: 90 tablet, Rfl: 3    ibuprofen (ADVIL,MOTRIN) 400 MG tablet, Take 1 tablet by mouth Every 8 (Eight) Hours As Needed for Mild Pain or Moderate Pain. Take with food, Disp: 12 tablet, Rfl: 0    levothyroxine (SYNTHROID, LEVOTHROID) 50 MCG tablet, Take 1 tablet by mouth Daily., Disp: , Rfl:     metoprolol succinate XL (TOPROL-XL) 25 MG 24 hr tablet, Take 1.5 tablets by mouth 2 (Two) Times a Day., Disp: 135 tablet, Rfl: 1    Multiple Vitamins-Minerals (CENTRUM SILVER 50+WOMEN) tablet, Take 1 tablet by mouth Daily., Disp: , Rfl:     omeprazole (priLOSEC) 40 MG capsule, , Disp: , Rfl:     rosuvastatin (CRESTOR) 5 MG tablet, Take 1 tablet by mouth Daily., Disp: , Rfl:     tiZANidine (ZANAFLEX) 4 MG tablet, , Disp: , Rfl:     valsartan (DIOVAN) 160 MG tablet, Take 2 tablets by mouth Daily. 1 tablet daily, Disp: , Rfl:      Assessment:        Patient Active Problem List   Diagnosis    Persistent atrial fibrillation    Leg swelling    Congestive heart failure    Essential hypertension    Abnormal cardiac function test    Coronary artery disease involving native heart without angina pectoris    Fatigue due to excessive exertion    Abnormal EKG    Spondylolisthesis of lumbar region    Lumbar spinal stenosis    Hyperlipidemia    Presence of Watchman left atrial appendage closure device               Plan:   1.  Bradycardia and here for test results: 3-second pause noted on Holter.  I will decrease her beta-blocker to 37.5 mg daily.      2.  Hypertension: Today in the office blood pressure stable.  I have encouraged not to take any NSAIDs if at all possible.    Educated patient on exercising for at least 30 minutes a day for 2 to 3 days a week. Importance of controlling hypertension and blood pressure checkup on the regular basis has been explained. Hypertension as a silent killer has been discussed. Risk reduction of the weight and regular exercises to control the hypertension has been explained.    3.   Hyperlipidemia: On statin.  Her primary care manages her labs and cholesterol.    4.  Persistent A-fib: Rate controlled.  She has a watchman's device.    5.  Mild MV regurgitation.  Holter monitor showed EF 61 to 65%, normal LV function, mild aortic valve stenosis, RV systolic pressure from TV regurgitation is moderately elevated.  Asymptomatic.      6. Pause        No diagnosis found.    There are no diagnoses linked to this encounter.    COUNSELING: quintin Medeiroseling was given to patient for the following topics: diagnostic results, risk factor reductions, impressions, risks and benefits of treatment options and importance of treatment compliance .       SMOKING COUNSELING: denies    Holter results reviewed. I will decrease BB to 37.5 mg daily. She will have a 48 hour holter prior to her follow up in 2 months with blood pressure/HR diary.    Sincerely,   TERESA Cisneros  [facility]  08/29/23  15:45 EDT    EMR Dragon/Transcription disclaimer:   Much of this encounter note is an electronic transcription/translation of spoken language to printed text. The electronic translation of spoken language may permit erroneous, or at times, nonsensical words or phrases to be inadvertently transcribed; Although I have reviewed the note for such errors, some may still exist.

## 2023-08-30 ENCOUNTER — OFFICE VISIT (OUTPATIENT)
Dept: CARDIOLOGY | Facility: CLINIC | Age: 73
End: 2023-08-30
Payer: MEDICARE

## 2023-08-30 VITALS
WEIGHT: 138 LBS | SYSTOLIC BLOOD PRESSURE: 134 MMHG | DIASTOLIC BLOOD PRESSURE: 66 MMHG | HEIGHT: 65 IN | OXYGEN SATURATION: 99 % | HEART RATE: 56 BPM | BODY MASS INDEX: 22.99 KG/M2

## 2023-08-30 DIAGNOSIS — I48.0 PAROXYSMAL ATRIAL FIBRILLATION: Primary | ICD-10-CM

## 2023-08-30 DIAGNOSIS — Z95.818 PRESENCE OF WATCHMAN LEFT ATRIAL APPENDAGE CLOSURE DEVICE: ICD-10-CM

## 2023-08-30 DIAGNOSIS — I25.10 CORONARY ARTERY DISEASE INVOLVING NATIVE CORONARY ARTERY OF NATIVE HEART WITHOUT ANGINA PECTORIS: ICD-10-CM

## 2023-08-30 DIAGNOSIS — I10 ESSENTIAL HYPERTENSION: ICD-10-CM

## 2023-08-30 DIAGNOSIS — E78.5 HYPERLIPIDEMIA, UNSPECIFIED HYPERLIPIDEMIA TYPE: ICD-10-CM

## 2023-08-30 PROCEDURE — 3075F SYST BP GE 130 - 139MM HG: CPT | Performed by: NURSE PRACTITIONER

## 2023-08-30 PROCEDURE — 3078F DIAST BP <80 MM HG: CPT | Performed by: NURSE PRACTITIONER

## 2023-08-30 RX ORDER — TRAMADOL HYDROCHLORIDE 50 MG/1
50 TABLET ORAL EVERY 6 HOURS PRN
COMMUNITY

## 2023-10-23 ENCOUNTER — TELEPHONE (OUTPATIENT)
Dept: CARDIOLOGY | Facility: CLINIC | Age: 73
End: 2023-10-23
Payer: MEDICARE

## 2023-10-23 NOTE — TELEPHONE ENCOUNTER
----- Message from TERESA Cisneros sent at 10/23/2023  2:41 PM EDT -----  Please have her hold her BB until seen by Dr. BRITTON. Have her appointment changed to 10/26 instead of waiting til follow up with Dr. BRITTON on 10/30 due to results of holter.    Thanks, López

## 2023-10-26 ENCOUNTER — OFFICE VISIT (OUTPATIENT)
Dept: CARDIOLOGY | Facility: CLINIC | Age: 73
End: 2023-10-26
Payer: MEDICARE

## 2023-10-26 VITALS
DIASTOLIC BLOOD PRESSURE: 64 MMHG | WEIGHT: 149 LBS | HEART RATE: 84 BPM | HEIGHT: 65 IN | BODY MASS INDEX: 24.83 KG/M2 | SYSTOLIC BLOOD PRESSURE: 138 MMHG

## 2023-10-26 DIAGNOSIS — I10 ESSENTIAL HYPERTENSION: ICD-10-CM

## 2023-10-26 DIAGNOSIS — I48.19 PERSISTENT ATRIAL FIBRILLATION: Primary | ICD-10-CM

## 2023-10-26 DIAGNOSIS — E78.5 HYPERLIPIDEMIA, UNSPECIFIED HYPERLIPIDEMIA TYPE: ICD-10-CM

## 2023-10-26 DIAGNOSIS — Z95.818 PRESENCE OF WATCHMAN LEFT ATRIAL APPENDAGE CLOSURE DEVICE: ICD-10-CM

## 2023-10-26 DIAGNOSIS — I25.10 CORONARY ARTERY DISEASE INVOLVING NATIVE CORONARY ARTERY OF NATIVE HEART WITHOUT ANGINA PECTORIS: ICD-10-CM

## 2023-10-26 PROCEDURE — 3078F DIAST BP <80 MM HG: CPT | Performed by: INTERNAL MEDICINE

## 2023-10-26 PROCEDURE — 3075F SYST BP GE 130 - 139MM HG: CPT | Performed by: INTERNAL MEDICINE

## 2023-10-26 PROCEDURE — 99214 OFFICE O/P EST MOD 30 MIN: CPT | Performed by: INTERNAL MEDICINE

## 2023-10-26 RX ORDER — VALSARTAN 320 MG/1
320 TABLET ORAL DAILY
COMMUNITY
Start: 2023-09-02

## 2023-10-26 RX ORDER — FUROSEMIDE 40 MG/1
40 TABLET ORAL DAILY
COMMUNITY
Start: 2023-09-05

## 2023-10-26 NOTE — PROGRESS NOTES
FOLLOW UP WITH HOLTER RESULTS  HOLDING METOPROLOL PER TERESA BARAHONA   Subjective:        Tatyana Reyes is a 73 y.o. female who here for follow up    CC  Paroxysmal atrial fibrillation  HPI  73-year-old female with coronary artery disease, hypertension, atrial fibrillation here for the follow-up with no complaints of chest pain tightness heaviness or the pressure sensation     Problems Addressed this Visit          Cardiac and Vasculature    Persistent atrial fibrillation - Primary    Relevant Orders    Holter Monitor - 48 Hour    Essential hypertension    Relevant Medications    valsartan (DIOVAN) 320 MG tablet    furosemide (LASIX) 40 MG tablet    Coronary artery disease involving native heart without angina pectoris    Hyperlipidemia    Presence of Watchman left atrial appendage closure device     Diagnoses         Codes Comments    Persistent atrial fibrillation    -  Primary ICD-10-CM: I48.19  ICD-9-CM: 427.31     Essential hypertension     ICD-10-CM: I10  ICD-9-CM: 401.9     Presence of Watchman left atrial appendage closure device     ICD-10-CM: Z95.818  ICD-9-CM: V45.09     Hyperlipidemia, unspecified hyperlipidemia type     ICD-10-CM: E78.5  ICD-9-CM: 272.4     Coronary artery disease involving native coronary artery of native heart without angina pectoris     ICD-10-CM: I25.10  ICD-9-CM: 414.01           .    The following portions of the patient's history were reviewed and updated as appropriate: allergies, current medications, past family history, past medical history, past social history, past surgical history and problem list.    Past Medical History:   Diagnosis Date    Arthritis of back 2015    Atrial fibrillation     Atrial fibrillation     CHF (congestive heart failure)     Chronic kidney disease     Disease of thyroid gland     DVT (deep venous thrombosis) 09/08/2017    BILAT LE S/P SHOULDER REPLACEMENT    Dyspnea     Fracture, foot     Frozen shoulder 2oo5    GERD (gastroesophageal  "reflux disease)     Hypertension     Knee swelling 2017    Low back pain     Osteoarthritis     Periarthritis of shoulder 2005     reports that she has never smoked. She has never used smokeless tobacco. She reports that she does not drink alcohol and does not use drugs.   Family History   Problem Relation Age of Onset    Heart disease Mother     Stroke Mother     Diabetes Brother        Review of Systems  Constitutional: No wt loss, fever, fatigue  Gastrointestinal: No nausea, abdominal pain  Behavioral/Psych: No insomnia or anxiety   Cardiovascular no chest pains or tightness in the chest  Objective:       Physical Exam           Physical Exam  /64   Pulse 84   Ht 165.1 cm (65\")   Wt 67.6 kg (149 lb)   BMI 24.79 kg/m²     General appearance: No acute changes   Eyes: Sclerae conjunctivae normal, pupils reactive   HENT: Atraumatic; oropharynx clear with moist mucous membranes and no mucosal ulcerations;  Neck: Trachea midline; NECK, supple, no thyromegaly or lymphadenopathy   Lungs: Normal size and shape, normal breath sounds, equal distribution of air, no rales and rhonchi   CV: S1-S2 regular, no murmurs, no rub, no gallop   Abdomen: Soft, nontender; no masses , no abnormal abdominal sounds   Extremities: No deformity , normal color , no peripheral edema   Skin: Normal temperature, turgor and texture; no rash, ulcers  Psych: Appropriate affect, alert and oriented to person, place and time           Procedures      Echocardiogram:        Current Outpatient Medications:     acetaminophen (TYLENOL) 500 MG tablet, Take 2 tablets by mouth Every 6 (Six) Hours As Needed for Mild Pain., Disp: , Rfl:     amLODIPine (NORVASC) 10 MG tablet, Take 0.5 tablets by mouth Daily. TAKE 1/2 TABLET DAILY, Disp: , Rfl:     aspirin 81 MG chewable tablet, Chew 1 tablet Daily., Disp: , Rfl:     B Complex-Biotin-FA (VITAMIN B50 COMPLEX PO), Take  by mouth., Disp: , Rfl:     bisacodyl (DULCOLAX) 5 MG EC tablet, Take 1 tablet by " mouth Daily., Disp: , Rfl:     Ferrous Sulfate (IRON) 325 (65 Fe) MG tablet, Take 1 tablet by mouth Daily., Disp: , Rfl:     furosemide (LASIX) 40 MG tablet, Take 1 tablet by mouth Daily., Disp: , Rfl:     levothyroxine (SYNTHROID, LEVOTHROID) 50 MCG tablet, Take 1 tablet by mouth Daily., Disp: , Rfl:     Multiple Vitamins-Minerals (CENTRUM SILVER 50+WOMEN) tablet, Take 1 tablet by mouth Daily., Disp: , Rfl:     omeprazole (priLOSEC) 40 MG capsule, , Disp: , Rfl:     rosuvastatin (CRESTOR) 5 MG tablet, Take 1 tablet by mouth Daily., Disp: , Rfl:     tiZANidine (ZANAFLEX) 4 MG tablet, , Disp: , Rfl:     traMADol (ULTRAM) 50 MG tablet, Take 1 tablet by mouth Every 6 (Six) Hours As Needed., Disp: , Rfl:     valsartan (DIOVAN) 320 MG tablet, Take 1 tablet by mouth Daily., Disp: , Rfl:     metoprolol succinate XL (TOPROL-XL) 25 MG 24 hr tablet, Take 1.5 tablets by mouth 2 (Two) Times a Day., Disp: 135 tablet, Rfl: 1   Assessment:        [unfilled]            Plan:            ICD-10-CM ICD-9-CM   1. Persistent atrial fibrillation  I48.19 427.31   2. Essential hypertension  I10 401.9   3. Presence of Watchman left atrial appendage closure device  Z95.818 V45.09   4. Hyperlipidemia, unspecified hyperlipidemia type  E78.5 272.4   5. Coronary artery disease involving native coronary artery of native heart without angina pectoris  I25.10 414.01     1. Persistent atrial fibrillation  Controlled  - Holter Monitor - 48 Hour; Future    2. Essential hypertension  Blood pressure controlled    3. Presence of Watchman left atrial appendage closure device  Stable    4. Hyperlipidemia, unspecified hyperlipidemia type  Continue current treatment    5. Coronary artery disease involving native coronary artery of native heart without angina pectoris  No angina pectoris      SSS  PT WANTS TO WAIT FOR PACER    RESTART TOPROL 25 MG    6 MONTHS WITH 48 HR HOLTER      COUNSELING:    Tatyana GoldtCounseling was given to patient for the following  topics: diagnostic results, risk factor reductions, impressions, risks and benefits of treatment options and importance of treatment compliance .       SMOKING COUNSELING:        Dictated using Dragon dictation

## 2023-11-30 NOTE — DISCHARGE INSTRUCTIONS
Lumbar Epidural Steroid Injection Instructions  Plan includes:  1.  Lumbar epidural steroid injections, up to 3, spaced 4 weeks apart.  If pain control is acceptable after 1 or 2 injections, it was discussed with the patient that they may return for the subsequent injections if and when their pain returns.  The risks were discussed with the patient including failure of relief, worsening pain, Headache (post dural puncture headache), bleeding (epidural hematoma) and infection (epidural abscess or skin infection).  2.  Physical therapy exercises at home as prescribed by physical therapy or from the pain clinic handout (given to the patient).  Continuation of these exercises every day, or multiple times per week, even when the patient has good pain relief, was stressed to the patient as a preventative measure to decrease the frequency and severity of future pain episodes.  3.  Continue pain medicines as already prescribed.  If patient not currently taking any, it is recommended to begin Acetaminophen 1000 mg po q 8 hours.  If other medicines containing Acetaminophen are currently prescribed, maintain daily dose at 3000 mg.    4.  If they can tolerate NSAIDS, it is recommended to take Ibuprofen 600 mg po q 6 hours for 7 days during pain exacerbations.  Alternatively, they may substitute an NSAID of their choice (e.g. Aleve).  This may be taken at the same time as Acetaminophen.  5.  Heat and ice to the affected area as tolerated for pain control.  It was discussed that heating pads can cause burns.  6.  Daily low impact exercise such as walking or water exercise was recommended to maintain overall health and aid in weight control.   7.  Follow up as needed for subsequent injections.  8.  Patient was counseled to abstain from tobacco products.     Yes

## 2024-02-06 RX ORDER — METOPROLOL SUCCINATE 25 MG/1
TABLET, EXTENDED RELEASE ORAL
Qty: 270 TABLET | Refills: 1 | Status: SHIPPED | OUTPATIENT
Start: 2024-02-06

## 2024-05-02 ENCOUNTER — OFFICE VISIT (OUTPATIENT)
Dept: CARDIOLOGY | Facility: CLINIC | Age: 74
End: 2024-05-02
Payer: MEDICARE

## 2024-05-02 VITALS
DIASTOLIC BLOOD PRESSURE: 68 MMHG | BODY MASS INDEX: 23.26 KG/M2 | WEIGHT: 139.6 LBS | SYSTOLIC BLOOD PRESSURE: 138 MMHG | HEIGHT: 65 IN

## 2024-05-02 DIAGNOSIS — I48.19 PERSISTENT ATRIAL FIBRILLATION: Primary | ICD-10-CM

## 2024-05-02 DIAGNOSIS — I10 ESSENTIAL HYPERTENSION: ICD-10-CM

## 2024-05-02 PROCEDURE — 99213 OFFICE O/P EST LOW 20 MIN: CPT | Performed by: INTERNAL MEDICINE

## 2024-05-02 PROCEDURE — 3075F SYST BP GE 130 - 139MM HG: CPT | Performed by: INTERNAL MEDICINE

## 2024-05-02 PROCEDURE — 3078F DIAST BP <80 MM HG: CPT | Performed by: INTERNAL MEDICINE

## 2024-05-02 RX ORDER — AMLODIPINE BESYLATE 5 MG/1
5 TABLET ORAL DAILY
COMMUNITY
Start: 2024-03-25

## 2024-05-02 RX ORDER — AMOXICILLIN 500 MG/1
500 CAPSULE ORAL TAKE AS DIRECTED
COMMUNITY
Start: 2024-01-23

## 2024-10-15 RX ORDER — METOPROLOL SUCCINATE 25 MG/1
37.5 TABLET, EXTENDED RELEASE ORAL 2 TIMES DAILY
Qty: 270 TABLET | Refills: 1 | Status: SHIPPED | OUTPATIENT
Start: 2024-10-15

## 2025-02-04 ENCOUNTER — OFFICE VISIT (OUTPATIENT)
Dept: CARDIOLOGY | Facility: CLINIC | Age: 75
End: 2025-02-04
Payer: MEDICARE

## 2025-02-04 VITALS
WEIGHT: 143 LBS | BODY MASS INDEX: 23.82 KG/M2 | DIASTOLIC BLOOD PRESSURE: 80 MMHG | HEART RATE: 72 BPM | SYSTOLIC BLOOD PRESSURE: 144 MMHG | HEIGHT: 65 IN

## 2025-02-04 DIAGNOSIS — I48.19 PERSISTENT ATRIAL FIBRILLATION: Primary | ICD-10-CM

## 2025-02-04 DIAGNOSIS — E78.5 HYPERLIPIDEMIA, UNSPECIFIED HYPERLIPIDEMIA TYPE: ICD-10-CM

## 2025-02-04 DIAGNOSIS — Z01.810 PRE-OPERATIVE CARDIOVASCULAR EXAMINATION: ICD-10-CM

## 2025-02-04 DIAGNOSIS — I10 ESSENTIAL HYPERTENSION: ICD-10-CM

## 2025-02-04 DIAGNOSIS — I25.10 CORONARY ARTERY DISEASE INVOLVING NATIVE CORONARY ARTERY OF NATIVE HEART WITHOUT ANGINA PECTORIS: ICD-10-CM

## 2025-02-04 DIAGNOSIS — Z95.818 PRESENCE OF WATCHMAN LEFT ATRIAL APPENDAGE CLOSURE DEVICE: ICD-10-CM

## 2025-02-04 DIAGNOSIS — I50.9 CONGESTIVE HEART FAILURE, UNSPECIFIED HF CHRONICITY, UNSPECIFIED HEART FAILURE TYPE: Primary | ICD-10-CM

## 2025-02-04 RX ORDER — SODIUM BICARBONATE 650 MG/1
650 TABLET ORAL
COMMUNITY
Start: 2023-01-24 | End: 2025-05-20

## 2025-02-04 RX ORDER — METRONIDAZOLE 500 MG/1
500 TABLET ORAL
COMMUNITY
Start: 2025-02-03

## 2025-02-04 RX ORDER — HYDRALAZINE HYDROCHLORIDE 25 MG/1
25 TABLET, FILM COATED ORAL
COMMUNITY
Start: 2025-01-17 | End: 2025-07-16

## 2025-02-04 RX ORDER — METOPROLOL SUCCINATE 25 MG/1
25 TABLET, EXTENDED RELEASE ORAL DAILY
COMMUNITY
Start: 2024-05-03

## 2025-02-04 RX ORDER — NEOMYCIN SULFATE 500 MG/1
1000 TABLET ORAL
COMMUNITY
Start: 2025-02-03

## 2025-02-04 NOTE — PROGRESS NOTES
Subjective:        Tatyana Reyes is a 74 y.o. female who here for follow up    Chief Complaint   Patient presents with    Follow-up     CARDIAC  CLEARANCE       HPI    This is a 74-year-old female who is new with this provider with coronary artery disease, chronic atrial fibrillation, chronic diastolic congestive heart failure, hypertension, CKD, history of DVT, seen in office today in follow-up for cardiac clearance for colon cancer surgery.    Echo 8/15/2023 EF 61 to 65%, normal LV function.  Mild AV stenosis.  Treadmill stress test 2018 asymptomatic for chest pain, equivocal ECG stress test.  Cardiac catheterization 2018 normal left main, proximal LAD 80% reduced to 0% with drug-eluting stent.  Circumflex codominant normal.  RCA codominant normal.    The following portions of the patient's history were reviewed and updated as appropriate: allergies, current medications, past family history, past medical history, past social history, past surgical history and problem list.    Past Medical History:   Diagnosis Date    Arthritis of back 2015    Atrial fibrillation     Atrial fibrillation     CHF (congestive heart failure)     Chronic kidney disease     Disease of thyroid gland     DVT (deep venous thrombosis) 09/08/2017    BILAT LE S/P SHOULDER REPLACEMENT    Dyspnea     Fracture, foot     Frozen shoulder 2oo5    GERD (gastroesophageal reflux disease)     Hypertension     Knee swelling 2017    Low back pain     Osteoarthritis     Periarthritis of shoulder 2005         reports that she has never smoked. She has never been exposed to tobacco smoke. She has never used smokeless tobacco. She reports current alcohol use of about 1.0 standard drink of alcohol per week. She reports that she does not use drugs.     Family History   Problem Relation Age of Onset    Heart disease Mother     Stroke Mother     Diabetes Brother        ROS     Review of Systems  Constitutional: No wt loss, fever, fatigue  Gastrointestinal: No  nausea, abdominal pain  Behavioral/Psych: No insomnia or anxiety  Cardiovascular no chest pain or shortness of breath      Objective:           Vitals and nursing note reviewed.   Constitutional:       Appearance: Well-developed.   HENT:      Head: Normocephalic.      Right Ear: External ear normal.      Left Ear: External ear normal.   Neck:      Vascular: No JVD.   Pulmonary:      Effort: Pulmonary effort is normal. No respiratory distress.      Breath sounds: Normal breath sounds. No stridor. No rales.   Cardiovascular:      Normal rate. Regular rhythm.      No gallop.    Pulses:     Intact distal pulses.   Edema:     Peripheral edema absent.   Abdominal:      General: Bowel sounds are normal. There is no distension.      Palpations: Abdomen is soft.      Tenderness: There is no abdominal tenderness. There is no guarding.   Musculoskeletal: Normal range of motion.         General: No tenderness.      Cervical back: Normal range of motion. Skin:     General: Skin is warm.   Neurological:      Mental Status: Alert and oriented to person, place, and time.      Deep Tendon Reflexes: Reflexes are normal and symmetric.   Psychiatric:         Judgment: Judgment normal.           ECG 12 Lead    Date/Time: 2/4/2025 2:50 PM  Performed by: Lynne Marroquin APRN    Authorized by: Lynne Marroquin APRN  Comparison: compared with previous ECG from 8/4/2023  Similar to previous ECG  Rhythm: atrial fibrillation  Rate: normal  BPM: 71    Clinical impression: abnormal EKG          Conclusion       Successful right and left coronary angiogram and LV gram  Normal left main  The proximal LAD reduced from 80% to 0% with 3.0/15 dilated to 3.2 vision stent  Circumflex codominant normal  RCA codominant and normal  Normal LV gram    Interpretation Summary       Arrhythmias were not significant.  Equivocal ECG evidence of myocardial ischemia.Negative clinical evidence of myocardial ischemia. Findings consistent with an equivocal ECG  stress test. Submaximal Stress Test Asymptomatic for chest pain    Interpretation Summary         Left ventricular ejection fraction appears to be 61 - 65%.    Left ventricular diastolic function was normal.    Mild aortic valve stenosis is present.    Estimated right ventricular systolic pressure from tricuspid regurgitation is moderately elevated (45-55 mmHg).         Current Outpatient Medications:     acetaminophen (TYLENOL) 500 MG tablet, Take 2 tablets by mouth Every 6 (Six) Hours As Needed for Mild Pain., Disp: , Rfl:     amLODIPine (NORVASC) 5 MG tablet, Take 1 tablet by mouth Daily., Disp: , Rfl:     aspirin 81 MG chewable tablet, Chew 1 tablet Daily., Disp: , Rfl:     B Complex-Biotin-FA (VITAMIN B50 COMPLEX PO), Take  by mouth., Disp: , Rfl:     bisacodyl (DULCOLAX) 5 MG EC tablet, Take 1 tablet by mouth Daily., Disp: , Rfl:     Ferrous Sulfate (IRON) 325 (65 Fe) MG tablet, Take 1 tablet by mouth Daily., Disp: , Rfl:     furosemide (LASIX) 40 MG tablet, Take 1 tablet by mouth Daily., Disp: , Rfl:     hydrALAZINE (APRESOLINE) 25 MG tablet, Take 1 tablet by mouth., Disp: , Rfl:     levothyroxine (SYNTHROID, LEVOTHROID) 50 MCG tablet, Take 1 tablet by mouth Daily., Disp: , Rfl:     metoprolol succinate XL (TOPROL-XL) 25 MG 24 hr tablet, Take 1 tablet by mouth Daily., Disp: , Rfl:     metroNIDAZOLE (FLAGYL) 500 MG tablet, Take 1 tablet by mouth., Disp: , Rfl:     Multiple Vitamins-Minerals (CENTRUM SILVER 50+WOMEN) tablet, Take 1 tablet by mouth Daily., Disp: , Rfl:     neomycin (MYCIFRADIN) 500 MG tablet, Take 2 tablets by mouth., Disp: , Rfl:     omeprazole (priLOSEC) 40 MG capsule, , Disp: , Rfl:     rosuvastatin (CRESTOR) 5 MG tablet, Take 1 tablet by mouth Daily., Disp: , Rfl:     sodium bicarbonate 650 MG tablet, Take 1 tablet by mouth., Disp: , Rfl:     tiZANidine (ZANAFLEX) 4 MG tablet, , Disp: , Rfl:     valsartan (DIOVAN) 320 MG tablet, Take 1 tablet by mouth Daily., Disp: , Rfl:      Assessment:         Patient Active Problem List   Diagnosis    Persistent atrial fibrillation    Leg swelling    Congestive heart failure    Essential hypertension    Abnormal cardiac function test    Coronary artery disease involving native heart without angina pectoris    Fatigue due to excessive exertion    Abnormal EKG    Spondylolisthesis of lumbar region    Lumbar spinal stenosis    Hyperlipidemia    Presence of Watchman left atrial appendage closure device               Plan:   1.  Coronary artery disease: no chest pain. Continue aspirin, metoprolol, rosuvastatin    Risk reduction for the coronary artery disease, controlling the blood pressure, blood sugar management, cholesterol management, exercise, stress management, and proper compliance with medications and follow-up has been discussed    2.  Chronic atrial fibrillation s/p watchman: controlled on toprol, continue.     3.  Hypertension: bp slightly elevated but stable, no change in meds at this time given upcoming surgery    4.  Hyperlipidemia: continue current management    5.  Cardiac clearance for colon surgery: will need lexiscan and echo             No diagnosis found.    There are no diagnoses linked to this encounter.    COUNSELING: quintin GoldtCounseling was given to patient for the following topics: diagnostic results, risk factor reductions, impressions, risks and benefits of treatment options and importance of treatment compliance .       SMOKING COUNSELING: denies    Lexiscan and echo, if ok clear for surgery    Sincerely,   TERESA Mckeon  [facility]  02/04/25  14:45 EST    EMR Dragon/Transcription disclaimer:   Much of this encounter note is an electronic transcription/translation of spoken language to printed text. The electronic translation of spoken language may permit erroneous, or at times, nonsensical words or phrases to be inadvertently transcribed; Although I have reviewed the note for such errors, some may still  exist.

## 2025-02-05 ENCOUNTER — HOSPITAL ENCOUNTER (OUTPATIENT)
Dept: CARDIOLOGY | Facility: HOSPITAL | Age: 75
Discharge: HOME | End: 2025-02-05
Payer: MEDICARE

## 2025-02-05 VITALS
BODY MASS INDEX: 23.51 KG/M2 | SYSTOLIC BLOOD PRESSURE: 136 MMHG | HEART RATE: 70 BPM | DIASTOLIC BLOOD PRESSURE: 80 MMHG | HEIGHT: 65 IN | WEIGHT: 141.09 LBS

## 2025-02-05 PROCEDURE — 93306 TTE W/DOPPLER COMPLETE: CPT

## 2025-02-05 PROCEDURE — 25510000001 PERFLUTREN 6.52 MG/ML SUSPENSION 2 ML VIAL

## 2025-02-05 RX ADMIN — SODIUM CHLORIDE 2 ML: 9 INJECTION INTRAMUSCULAR; INTRAVENOUS; SUBCUTANEOUS at 07:48

## 2025-02-06 ENCOUNTER — HOSPITAL ENCOUNTER (OUTPATIENT)
Dept: NUCLEAR MEDICINE | Facility: HOSPITAL | Age: 75
Discharge: HOME | End: 2025-02-06
Payer: MEDICARE

## 2025-02-06 LAB
AORTIC DIMENSIONLESS INDEX: 0.6 (DI)
ASCENDING AORTA: 2.6 CM
AV MEAN PRESS GRAD SYS DOP V1V2: 5.5 MMHG
AV VMAX SYS DOP: 172.1 CM/SEC
BH CV ECHO MEAS - ACS: 1.67 CM
BH CV ECHO MEAS - AO MAX PG: 11.8 MMHG
BH CV ECHO MEAS - AO ROOT DIAM: 2.5 CM
BH CV ECHO MEAS - AO V2 VTI: 38.6 CM
BH CV ECHO MEAS - AVA(I,D): 1.66 CM2
BH CV ECHO MEAS - EDV(CUBED): 99.2 ML
BH CV ECHO MEAS - EDV(MOD-SP2): 108 ML
BH CV ECHO MEAS - EDV(MOD-SP4): 112 ML
BH CV ECHO MEAS - EF(MOD-SP2): 71.3 %
BH CV ECHO MEAS - EF(MOD-SP4): 70.5 %
BH CV ECHO MEAS - ESV(CUBED): 21.7 ML
BH CV ECHO MEAS - ESV(MOD-SP2): 31 ML
BH CV ECHO MEAS - ESV(MOD-SP4): 33 ML
BH CV ECHO MEAS - FS: 39.8 %
BH CV ECHO MEAS - IVS/LVPW: 1.06 CM
BH CV ECHO MEAS - IVSD: 1.22 CM
BH CV ECHO MEAS - LAT PEAK E' VEL: 9.8 CM/SEC
BH CV ECHO MEAS - LV MASS(C)D: 204.9 GRAMS
BH CV ECHO MEAS - LV MAX PG: 3.9 MMHG
BH CV ECHO MEAS - LV MEAN PG: 2.18 MMHG
BH CV ECHO MEAS - LV V1 MAX: 99 CM/SEC
BH CV ECHO MEAS - LV V1 VTI: 23.3 CM
BH CV ECHO MEAS - LVIDD: 4.6 CM
BH CV ECHO MEAS - LVIDS: 2.8 CM
BH CV ECHO MEAS - LVOT AREA: 2.8 CM2
BH CV ECHO MEAS - LVOT DIAM: 1.87 CM
BH CV ECHO MEAS - LVPWD: 1.16 CM
BH CV ECHO MEAS - MED PEAK E' VEL: 10.1 CM/SEC
BH CV ECHO MEAS - MR MAX PG: 92 MMHG
BH CV ECHO MEAS - MR MAX VEL: 479.5 CM/SEC
BH CV ECHO MEAS - MV DEC SLOPE: 594.5 CM/SEC2
BH CV ECHO MEAS - MV DEC TIME: 0.14 SEC
BH CV ECHO MEAS - MV E MAX VEL: 113 CM/SEC
BH CV ECHO MEAS - MV MAX PG: 8 MMHG
BH CV ECHO MEAS - MV MEAN PG: 1.59 MMHG
BH CV ECHO MEAS - MV P1/2T: 70.9 MSEC
BH CV ECHO MEAS - MV V2 VTI: 37.6 CM
BH CV ECHO MEAS - MVA(P1/2T): 3.1 CM2
BH CV ECHO MEAS - MVA(VTI): 1.71 CM2
BH CV ECHO MEAS - PA ACC TIME: 0.09 SEC
BH CV ECHO MEAS - PA V2 MAX: 100.4 CM/SEC
BH CV ECHO MEAS - PULM DIAS VEL: 32.3 CM/SEC
BH CV ECHO MEAS - PULM S/D: 1.15
BH CV ECHO MEAS - PULM SYS VEL: 37.2 CM/SEC
BH CV ECHO MEAS - QP/QS: 1
BH CV ECHO MEAS - RAP SYSTOLE: 3 MMHG
BH CV ECHO MEAS - RV MAX PG: 3.5 MMHG
BH CV ECHO MEAS - RV V1 MAX: 93.5 CM/SEC
BH CV ECHO MEAS - RV V1 VTI: 22.9 CM
BH CV ECHO MEAS - RVOT DIAM: 1.88 CM
BH CV ECHO MEAS - RVSP: 35 MMHG
BH CV ECHO MEAS - SV(LVOT): 64.2 ML
BH CV ECHO MEAS - SV(MOD-SP2): 77 ML
BH CV ECHO MEAS - SV(MOD-SP4): 79 ML
BH CV ECHO MEAS - SV(RVOT): 63.9 ML
BH CV ECHO MEAS - TAPSE (>1.6): 1.71 CM
BH CV ECHO MEAS - TR MAX PG: 31.9 MMHG
BH CV ECHO MEAS - TR MAX VEL: 282.3 CM/SEC
BH CV ECHO MEASUREMENTS AVERAGE E/E' RATIO: 11.36
BH CV REST NUCLEAR ISOTOPE DOSE: 11.2 MCI
BH CV STRESS COMMENTS STAGE 1: NORMAL
BH CV STRESS DOSE REGADENOSON STAGE 1: 0.4
BH CV STRESS DURATION MIN STAGE 1: 0
BH CV STRESS DURATION SEC STAGE 1: 10
BH CV STRESS NUCLEAR ISOTOPE DOSE: 34 MCI
BH CV STRESS PROTOCOL 1: NORMAL
BH CV STRESS RECOVERY BP: NORMAL MMHG
BH CV STRESS RECOVERY HR: 92 BPM
BH CV STRESS STAGE 1: 1
BH CV XLRA - RV BASE: 3.5 CM
BH CV XLRA - RV LENGTH: 6.8 CM
BH CV XLRA - RV MID: 3.8 CM
BH CV XLRA - TDI S': 9.8 CM/SEC
LEFT ATRIUM VOLUME INDEX: 46.9 ML/M2
LV EF BIPLANE MOD: 69.8 %
MAXIMAL PREDICTED HEART RATE: 144 BPM
PERCENT MAX PREDICTED HR: 65.97 %
SINUS: 2.44 CM
SPECT HRT GATED+EF W RNC IV: 70 %
STJ: 2.21 CM
STRESS BASELINE BP: NORMAL MMHG
STRESS BASELINE HR: 74 BPM
STRESS PERCENT HR: 78 %
STRESS POST PEAK BP: NORMAL MMHG
STRESS POST PEAK HR: 95 BPM
STRESS TARGET HR: 122 BPM

## 2025-02-06 PROCEDURE — 25010000002 REGADENOSON 0.4 MG/5ML SOLUTION

## 2025-02-06 PROCEDURE — 34310000005 TECHNETIUM TETROFOSMIN KIT

## 2025-02-06 PROCEDURE — 93017 CV STRESS TEST TRACING ONLY: CPT

## 2025-02-06 PROCEDURE — 78452 HT MUSCLE IMAGE SPECT MULT: CPT

## 2025-02-06 PROCEDURE — A9502 TC99M TETROFOSMIN: HCPCS

## 2025-02-06 RX ORDER — REGADENOSON 0.08 MG/ML
0.4 INJECTION, SOLUTION INTRAVENOUS
Status: COMPLETED | OUTPATIENT
Start: 2025-02-06 | End: 2025-02-06

## 2025-02-06 RX ADMIN — TETROFOSMIN 1 DOSE: 1.38 INJECTION, POWDER, LYOPHILIZED, FOR SOLUTION INTRAVENOUS at 08:38

## 2025-02-06 RX ADMIN — REGADENOSON 0.4 MG: 0.08 INJECTION, SOLUTION INTRAVENOUS at 10:05

## 2025-02-06 RX ADMIN — TETROFOSMIN 1 DOSE: 1.38 INJECTION, POWDER, LYOPHILIZED, FOR SOLUTION INTRAVENOUS at 10:05

## 2025-02-07 ENCOUNTER — TELEPHONE (OUTPATIENT)
Dept: CARDIOLOGY | Facility: CLINIC | Age: 75
End: 2025-02-07
Payer: MEDICARE

## 2025-02-07 NOTE — TELEPHONE ENCOUNTER
Notified patient of echo and stress results  Clear for surgery  Letter sent to Dr Zarate office  Fax #[phone]     Electronically signed by TERESA Mckeon, 02/07/25, 9:10 AM EST.

## 2025-04-04 PROCEDURE — 80048 BASIC METABOLIC PNL TOTAL CA: CPT | Performed by: FAMILY MEDICINE

## 2025-04-21 ENCOUNTER — TELEPHONE (OUTPATIENT)
Dept: CARDIOLOGY | Facility: CLINIC | Age: 75
End: 2025-04-21
Payer: MEDICARE

## 2025-04-21 NOTE — TELEPHONE ENCOUNTER
Caller: Tatyana Reyes    Relationship to patient: Self    Best call back number:  [phone]    Patient is needing: PATIENT IS WAITING TO HEAR IF SHE NEEDS CHEMO AND WONT KNOW UNTIL MAY 15TH. SHE IS CHECKING TO SEE IF SHE SHOULD WAIT ON HER HOLTER MONITOR.

## 2025-05-05 ENCOUNTER — OFFICE VISIT (OUTPATIENT)
Dept: CARDIOLOGY | Facility: CLINIC | Age: 75
End: 2025-05-05
Payer: MEDICARE

## 2025-05-05 VITALS
DIASTOLIC BLOOD PRESSURE: 78 MMHG | WEIGHT: 141 LBS | BODY MASS INDEX: 23.49 KG/M2 | HEIGHT: 65 IN | SYSTOLIC BLOOD PRESSURE: 152 MMHG | HEART RATE: 100 BPM

## 2025-05-05 DIAGNOSIS — I10 ESSENTIAL HYPERTENSION: ICD-10-CM

## 2025-05-05 DIAGNOSIS — E78.5 HYPERLIPIDEMIA, UNSPECIFIED HYPERLIPIDEMIA TYPE: ICD-10-CM

## 2025-05-05 DIAGNOSIS — I48.19 PERSISTENT ATRIAL FIBRILLATION: Primary | ICD-10-CM

## 2025-05-05 DIAGNOSIS — I25.10 CORONARY ARTERY DISEASE INVOLVING NATIVE CORONARY ARTERY OF NATIVE HEART WITHOUT ANGINA PECTORIS: ICD-10-CM

## 2025-05-05 PROCEDURE — 3078F DIAST BP <80 MM HG: CPT | Performed by: INTERNAL MEDICINE

## 2025-05-05 PROCEDURE — 99213 OFFICE O/P EST LOW 20 MIN: CPT | Performed by: INTERNAL MEDICINE

## 2025-05-05 PROCEDURE — 3077F SYST BP >= 140 MM HG: CPT | Performed by: INTERNAL MEDICINE

## 2025-05-05 NOTE — PROGRESS NOTES
Follow up Holter results   Subjective:        Tatyana Reyes is a 75 y.o. female who here for follow up    CC  6 months follow-up  HPI  75-year-old female with coronary artery disease hypertension atrial fibrillation here for the Holter reports denies any chest pains or tightness in the chest     Problems Addressed this Visit          Cardiac and Vasculature    Persistent atrial fibrillation - Primary    Essential hypertension    Coronary artery disease involving native heart without angina pectoris    Hyperlipidemia     Diagnoses         Codes Comments      Persistent atrial fibrillation    -  Primary ICD-10-CM: I48.19  ICD-9-CM: 427.31       Essential hypertension     ICD-10-CM: I10  ICD-9-CM: 401.9       Coronary artery disease involving native coronary artery of native heart without angina pectoris     ICD-10-CM: I25.10  ICD-9-CM: 414.01       Hyperlipidemia, unspecified hyperlipidemia type     ICD-10-CM: E78.5  ICD-9-CM: 272.4           .    The following portions of the patient's history were reviewed and updated as appropriate: allergies, current medications, past family history, past medical history, past social history, past surgical history and problem list.    Past Medical History:   Diagnosis Date    Arthritis of back 2015    Atrial fibrillation     Atrial fibrillation     CHF (congestive heart failure)     Chronic kidney disease     Disease of thyroid gland     DVT (deep venous thrombosis) 09/08/2017    BILAT LE S/P SHOULDER REPLACEMENT    Dyspnea     Fracture, foot     Frozen shoulder 2oo5    GERD (gastroesophageal reflux disease)     Hypertension     Knee swelling 2017    Low back pain     Osteoarthritis     Periarthritis of shoulder 2005     reports that she has never smoked. She has never been exposed to tobacco smoke. She has never used smokeless tobacco. She reports current alcohol use of about 1.0 standard drink of alcohol per week. She reports that she does not use drugs.   Family History  "  Problem Relation Age of Onset    Heart disease Mother     Stroke Mother     Diabetes Brother        Review of Systems  Constitutional: No wt loss, fever, fatigue  Gastrointestinal: No nausea, abdominal pain  Behavioral/Psych: No insomnia or anxiety   Cardiovascular no chest pains or tightness in the chest  Objective:       Physical Exam  /78   Pulse 100   Ht 165 cm (64.96\")   Wt 64 kg (141 lb)   BMI 23.49 kg/m²   General appearance: No acute changes   Neck: Trachea midline; NECK, supple, no thyromegaly or lymphadenopathy   Lungs: Normal size and shape, normal breath sounds, equal distribution of air, no rales and rhonchi   CV: S1-S2 regular, no murmurs, no rub, no gallop   Abdomen: Soft, nontender; no masses , no abnormal abdominal sounds   Extremities: No deformity , normal color , no peripheral edema   Skin: Normal temperature, turgor and texture; no rash, ulcers          Procedures      Echocardiogram:    Results for orders placed in visit on 02/04/25    Adult Transthoracic Echo Complete W/ Cont if Necessary Per Protocol    Interpretation Summary    Left ventricular ejection fraction appears to be 66 - 70%.    Left ventricular diastolic function was indeterminate.    The left atrial cavity is moderately dilated.    The right atrial cavity is mild to moderately  dilated.    Estimated right ventricular systolic pressure from tricuspid regurgitation is mildly elevated (35-45 mmHg).          Current Outpatient Medications:     acetaminophen (TYLENOL) 500 MG tablet, Take 2 tablets by mouth Every 6 (Six) Hours As Needed for Mild Pain., Disp: , Rfl:     amLODIPine (NORVASC) 5 MG tablet, Take 1 tablet by mouth Daily., Disp: , Rfl:     aspirin 81 MG chewable tablet, Chew 1 tablet Daily., Disp: , Rfl:     B Complex-Biotin-FA (VITAMIN B50 COMPLEX PO), Take  by mouth., Disp: , Rfl:     bisacodyl (DULCOLAX) 5 MG EC tablet, Take 1 tablet by mouth Daily., Disp: , Rfl:     Ferrous Sulfate (IRON) 325 (65 Fe) MG " tablet, Take 1 tablet by mouth Daily., Disp: , Rfl:     furosemide (LASIX) 40 MG tablet, Take 1 tablet by mouth Daily., Disp: , Rfl:     hydrALAZINE (APRESOLINE) 25 MG tablet, Take 1 tablet by mouth., Disp: , Rfl:     levothyroxine (SYNTHROID, LEVOTHROID) 50 MCG tablet, Take 1 tablet by mouth Daily., Disp: , Rfl:     Multiple Vitamins-Minerals (CENTRUM SILVER 50+WOMEN) tablet, Take 1 tablet by mouth Daily., Disp: , Rfl:     omeprazole (priLOSEC) 40 MG capsule, , Disp: , Rfl:     rosuvastatin (CRESTOR) 5 MG tablet, Take 1 tablet by mouth Daily., Disp: , Rfl:     sodium bicarbonate 650 MG tablet, Take 1 tablet by mouth., Disp: , Rfl:     tiZANidine (ZANAFLEX) 4 MG tablet, , Disp: , Rfl:     valsartan (DIOVAN) 320 MG tablet, Take 80 mg by mouth Daily., Disp: , Rfl:     metoprolol succinate XL (TOPROL-XL) 25 MG 24 hr tablet, Take 1 tablet by mouth Daily. (Patient not taking: Reported on 5/5/2025), Disp: , Rfl:     metroNIDAZOLE (FLAGYL) 500 MG tablet, Take 1 tablet by mouth. (Patient not taking: Reported on 5/5/2025), Disp: , Rfl:     neomycin (MYCIFRADIN) 500 MG tablet, Take 2 tablets by mouth. (Patient not taking: Reported on 5/5/2025), Disp: , Rfl:    Assessment:                Plan:          ICD-10-CM ICD-9-CM   1. Persistent atrial fibrillation  I48.19 427.31   2. Essential hypertension  I10 401.9   3. Coronary artery disease involving native coronary artery of native heart without angina pectoris  I25.10 414.01   4. Hyperlipidemia, unspecified hyperlipidemia type  E78.5 272.4     1. Persistent atrial fibrillation  Under control    2. Essential hypertension  Blood pressure under control    3. Coronary artery disease involving native coronary artery of native heart without angina pectoris  No angina pectoris    4. Hyperlipidemia, unspecified hyperlipidemia type  Continue current treatment      6 months  COUNSELING:    Tatyana Villagomezounseling was given to patient for the following topics: diagnostic results, risk  factor reductions, impressions, risks and benefits of treatment options and importance of treatment compliance .       SMOKING COUNSELING:        Dictated using Dragon dictation

## 2025-07-22 NOTE — ANESTHESIA PROCEDURE NOTES
Lab paged for blood culture and POC lactate   PAIN Epidural block    Pre-sedation assessment completed: 10/29/2019 10:33 AM    Patient reassessed immediately prior to procedure    Patient location during procedure: pain clinic  Start Time: 10/29/2019 10:34 AM  Stop Time: 10/29/2019 10:40 AM  Indication:at surgeon's request and procedure for pain  Performed By  Anesthesiologist: Jeffrey Thomason MD  Preanesthetic Checklist  Completed: patient identified, site marked, surgical consent, pre-op evaluation, timeout performed, risks and benefits discussed and monitors and equipment checked  Additional Notes  Post-Op Diagnosis Codes:     * Lumbago (M54.5)     * Lumbar neuritis (M54.16)     * Displacement of lumbar disc with radiculopathy (M51.16)    Prep:  Pt Position:prone  Sterile Tech:sterile barrier, mask and gloves  Prep:chlorhexidine gluconate and isopropyl alcohol  Monitoring:blood pressure monitoring, continuous pulse oximetry and EKG  Procedure:Sedation: no     Approach:right paramedian  Guidance: fluoroscopy  Location:lumbar  Level:4-5  Needle Type:[employer] Engineeringtead  Needle Gauge:20 G  Aspiration:negative  Test Dose:negative  Medications:  Depomedrol:80 mg  Comments:Lumbar epidural steroid injection was performed on the right at the L4-5 level.  There was an excellent loss resistance to injection.  No current return of red blood cells or cerebral spinal fluid.  There is no pain with injection.  No contrast was injected she has some mild renal failure.  80 mg of Depo-Medrol were then injected and the needle was withdrawn.  Post Assessment:  Pt Tolerance:patient tolerated the procedure well with no apparent complications  Complications:no

## (undated) DEVICE — GLIDESHEATH BASIC HYDROPHILIC COATED INTRODUCER SHEATH: Brand: GLIDESHEATH

## (undated) DEVICE — HI-TORQUE BALANCE MIDDLEWEIGHT GUIDE WIRE .014 STRAIGHT TIP 3.0 CM X 190 CM: Brand: HI-TORQUE BALANCE MIDDLEWEIGHT

## (undated) DEVICE — GW EMR FIX EXCHG J STD .035 3MM 260CM

## (undated) DEVICE — GC 5F 056 JL 3.5 LBT: Brand: BRITE TIP

## (undated) DEVICE — RUNTHROUGH NS EXTRA FLOPPY PTCA GUIDEWIRE: Brand: RUNTHROUGH

## (undated) DEVICE — KT MANIFLD CARDIAC

## (undated) DEVICE — TREK CORONARY DILATATION CATHETER 3.0 MM X 12 MM / RAPID-EXCHANGE: Brand: TREK

## (undated) DEVICE — CATH DIAG IMPULSE FL3.5 5F 100CM

## (undated) DEVICE — DEV INDEFLATOR

## (undated) DEVICE — CATH DIAG IMPULSE FR4 5F 100CM

## (undated) DEVICE — GUIDE CATHETER: Brand: MACH1™

## (undated) DEVICE — CATH VENT MIV RADL PIG ST TIP 4F 110CM

## (undated) DEVICE — PK CATH CARD 40